# Patient Record
Sex: FEMALE | Race: WHITE | Employment: FULL TIME | URBAN - METROPOLITAN AREA
[De-identification: names, ages, dates, MRNs, and addresses within clinical notes are randomized per-mention and may not be internally consistent; named-entity substitution may affect disease eponyms.]

---

## 2017-01-05 ENCOUNTER — OFFICE VISIT (OUTPATIENT)
Dept: INTERNAL MEDICINE CLINIC | Facility: CLINIC | Age: 37
End: 2017-01-05

## 2017-01-05 VITALS
DIASTOLIC BLOOD PRESSURE: 72 MMHG | RESPIRATION RATE: 16 BRPM | SYSTOLIC BLOOD PRESSURE: 122 MMHG | BODY MASS INDEX: 29.37 KG/M2 | WEIGHT: 172 LBS | HEART RATE: 78 BPM | HEIGHT: 64 IN

## 2017-01-05 DIAGNOSIS — Z51.81 ENCOUNTER FOR THERAPEUTIC DRUG MONITORING: Primary | ICD-10-CM

## 2017-01-05 DIAGNOSIS — E66.9 OBESITY (BMI 30.0-34.9): ICD-10-CM

## 2017-01-05 DIAGNOSIS — E53.8 VITAMIN B 12 DEFICIENCY: ICD-10-CM

## 2017-01-05 PROCEDURE — 96372 THER/PROPH/DIAG INJ SC/IM: CPT | Performed by: INTERNAL MEDICINE

## 2017-01-05 PROCEDURE — 99213 OFFICE O/P EST LOW 20 MIN: CPT | Performed by: INTERNAL MEDICINE

## 2017-01-05 RX ORDER — DIETHYLPROPION HYDROCHLORIDE 75 MG/1
TABLET ORAL
Qty: 30 TABLET | Refills: 0 | Status: SHIPPED | OUTPATIENT
Start: 2017-01-05 | End: 2017-02-02

## 2017-01-05 RX ORDER — TOPIRAMATE 25 MG/1
25 TABLET ORAL 2 TIMES DAILY
Qty: 60 TABLET | Refills: 5 | Status: SHIPPED | OUTPATIENT
Start: 2017-01-05 | End: 2017-02-02

## 2017-01-05 RX ORDER — CYANOCOBALAMIN 1000 UG/ML
1000 INJECTION INTRAMUSCULAR; SUBCUTANEOUS ONCE
Status: COMPLETED | OUTPATIENT
Start: 2017-01-05 | End: 2017-01-05

## 2017-01-05 RX ADMIN — CYANOCOBALAMIN 1000 MCG: 1000 INJECTION INTRAMUSCULAR; SUBCUTANEOUS at 15:31:00

## 2017-01-05 NOTE — PROGRESS NOTES
CC: Patient presents with:  Weight Check: down 5 lb       HPI:   Obesity, doing well on diethylpropion, no chest pain, worsening hunger later in the day.        Current Outpatient Prescriptions:  Diethylpropion HCl ER 75 MG Oral Tablet 24 Hr Take 1 ta denies shortness of breath   CARDIOVASCULAR: denies chest pain    EXAM:   /72 mmHg  Pulse 78  Resp 16  Ht 64\"  Wt 172 lb  BMI 29.51 kg/m2  GENERAL:  A/O x3  HEENT: atraumatic, normocephalic, throat is clear, PERRLA  LUNGS:  CTA bilat   CARDIO: RRR w

## 2017-01-28 ENCOUNTER — LAB ENCOUNTER (OUTPATIENT)
Dept: LAB | Facility: HOSPITAL | Age: 37
End: 2017-01-28
Attending: NURSE PRACTITIONER
Payer: COMMERCIAL

## 2017-01-28 DIAGNOSIS — Z13.228 SCREENING FOR ENDOCRINE/METABOLIC/IMMUNITY DISORDERS: ICD-10-CM

## 2017-01-28 DIAGNOSIS — Z13.0 SCREENING FOR ENDOCRINE/METABOLIC/IMMUNITY DISORDERS: ICD-10-CM

## 2017-01-28 DIAGNOSIS — Z13.29 SCREENING FOR ENDOCRINE/METABOLIC/IMMUNITY DISORDERS: ICD-10-CM

## 2017-01-28 LAB
HBV SURFACE AB SER QL: REACTIVE
HBV SURFACE AB SERPL IA-ACNC: 120.48 MIU/ML
RUBV IGG SER QL: POSITIVE

## 2017-01-28 PROCEDURE — 86765 RUBEOLA ANTIBODY: CPT

## 2017-01-28 PROCEDURE — 36415 COLL VENOUS BLD VENIPUNCTURE: CPT

## 2017-01-28 PROCEDURE — 86706 HEP B SURFACE ANTIBODY: CPT

## 2017-01-28 PROCEDURE — 86787 VARICELLA-ZOSTER ANTIBODY: CPT

## 2017-01-28 PROCEDURE — 86762 RUBELLA ANTIBODY: CPT

## 2017-01-28 PROCEDURE — 86735 MUMPS ANTIBODY: CPT

## 2017-01-31 LAB
MUV IGG SER IA-ACNC: POSITIVE
VZV IGG SER IA-ACNC: POSITIVE

## 2017-02-02 ENCOUNTER — OFFICE VISIT (OUTPATIENT)
Dept: INTERNAL MEDICINE CLINIC | Facility: CLINIC | Age: 37
End: 2017-02-02

## 2017-02-02 VITALS
HEART RATE: 78 BPM | SYSTOLIC BLOOD PRESSURE: 120 MMHG | BODY MASS INDEX: 28.85 KG/M2 | RESPIRATION RATE: 16 BRPM | WEIGHT: 169 LBS | DIASTOLIC BLOOD PRESSURE: 72 MMHG | HEIGHT: 64 IN

## 2017-02-02 DIAGNOSIS — E53.8 B12 DEFICIENCY: ICD-10-CM

## 2017-02-02 DIAGNOSIS — E66.9 OBESITY (BMI 30.0-34.9): ICD-10-CM

## 2017-02-02 DIAGNOSIS — Z51.81 ENCOUNTER FOR THERAPEUTIC DRUG MONITORING: Primary | ICD-10-CM

## 2017-02-02 PROCEDURE — 96372 THER/PROPH/DIAG INJ SC/IM: CPT | Performed by: INTERNAL MEDICINE

## 2017-02-02 PROCEDURE — 99213 OFFICE O/P EST LOW 20 MIN: CPT | Performed by: INTERNAL MEDICINE

## 2017-02-02 RX ORDER — TOPIRAMATE 50 MG/1
50 TABLET, FILM COATED ORAL 2 TIMES DAILY
Qty: 60 TABLET | Refills: 5 | Status: CANCELLED | OUTPATIENT
Start: 2017-02-02 | End: 2018-01-28

## 2017-02-02 RX ORDER — BUPROPION HYDROCHLORIDE 100 MG/1
100 TABLET ORAL 2 TIMES DAILY
Qty: 60 TABLET | Refills: 5 | Status: SHIPPED | OUTPATIENT
Start: 2017-02-02 | End: 2017-03-02

## 2017-02-02 RX ORDER — DIETHYLPROPION HYDROCHLORIDE 75 MG/1
TABLET ORAL
Qty: 30 TABLET | Refills: 0 | Status: SHIPPED | OUTPATIENT
Start: 2017-02-02 | End: 2017-03-02

## 2017-02-02 RX ORDER — CYANOCOBALAMIN 1000 UG/ML
1000 INJECTION INTRAMUSCULAR; SUBCUTANEOUS ONCE
Status: COMPLETED | OUTPATIENT
Start: 2017-02-02 | End: 2017-02-02

## 2017-02-02 RX ADMIN — CYANOCOBALAMIN 1000 MCG: 1000 INJECTION INTRAMUSCULAR; SUBCUTANEOUS at 13:43:00

## 2017-02-02 NOTE — PROGRESS NOTES
CC: Patient presents with:  Weight Check: down 3 lb       HPI:   Obesity, on phentermine and topamax. Some numbness in hands with topamax. No chest pain.        Current Outpatient Prescriptions:  Diethylpropion HCl ER 75 MG Oral Tablet 24 Hr Take 1 tab SYSTEMS:   CARDIOVASCULAR: denies chest pain    EXAM:   /72 mmHg  Pulse 78  Resp 16  Ht 64\"  Wt 169 lb  BMI 28.99 kg/m2  GENERAL: well nourished and in no apparent distress, A/O x3  HEENT: PERRLA  LUNGS: clear to auscultation bilat   CARDIO: RRR wit

## 2017-02-03 ENCOUNTER — TELEPHONE (OUTPATIENT)
Dept: INTERNAL MEDICINE CLINIC | Facility: CLINIC | Age: 37
End: 2017-02-03

## 2017-02-03 NOTE — TELEPHONE ENCOUNTER
Lm for pt (Lam Rush per HIPAA) to inform, per enrique @[facility] lab, MMR and rubeola still in process- should be resulted by the end of day. Once results received and reviewed will contact pt. To call back at the office if any further questions.

## 2017-03-02 ENCOUNTER — OFFICE VISIT (OUTPATIENT)
Dept: INTERNAL MEDICINE CLINIC | Facility: CLINIC | Age: 37
End: 2017-03-02

## 2017-03-02 VITALS
RESPIRATION RATE: 16 BRPM | BODY MASS INDEX: 27.83 KG/M2 | DIASTOLIC BLOOD PRESSURE: 78 MMHG | HEART RATE: 84 BPM | HEIGHT: 64 IN | SYSTOLIC BLOOD PRESSURE: 124 MMHG | WEIGHT: 163 LBS

## 2017-03-02 DIAGNOSIS — E66.9 OBESITY (BMI 30.0-34.9): ICD-10-CM

## 2017-03-02 DIAGNOSIS — Z51.81 ENCOUNTER FOR THERAPEUTIC DRUG MONITORING: Primary | ICD-10-CM

## 2017-03-02 DIAGNOSIS — E53.8 B12 DEFICIENCY: ICD-10-CM

## 2017-03-02 PROCEDURE — 96372 THER/PROPH/DIAG INJ SC/IM: CPT | Performed by: INTERNAL MEDICINE

## 2017-03-02 PROCEDURE — 99213 OFFICE O/P EST LOW 20 MIN: CPT | Performed by: INTERNAL MEDICINE

## 2017-03-02 RX ORDER — CYANOCOBALAMIN 1000 UG/ML
1000 INJECTION INTRAMUSCULAR; SUBCUTANEOUS ONCE
Status: COMPLETED | OUTPATIENT
Start: 2017-03-02 | End: 2017-03-02

## 2017-03-02 RX ORDER — DIETHYLPROPION HYDROCHLORIDE 75 MG/1
TABLET ORAL
Qty: 30 TABLET | Refills: 0 | Status: SHIPPED | OUTPATIENT
Start: 2017-03-02 | End: 2017-04-07

## 2017-03-02 RX ORDER — BUPROPION HYDROCHLORIDE 100 MG/1
200 TABLET ORAL 2 TIMES DAILY
Qty: 120 TABLET | Refills: 5 | Status: SHIPPED | OUTPATIENT
Start: 2017-03-02 | End: 2017-09-05

## 2017-03-02 RX ADMIN — CYANOCOBALAMIN 1000 MCG: 1000 INJECTION INTRAMUSCULAR; SUBCUTANEOUS at 14:40:00

## 2017-03-30 ENCOUNTER — PATIENT MESSAGE (OUTPATIENT)
Dept: INTERNAL MEDICINE CLINIC | Facility: CLINIC | Age: 37
End: 2017-03-30

## 2017-03-30 NOTE — TELEPHONE ENCOUNTER
From: Claire Ramirez  To: Roxanne Murrell MD  Sent: 3/30/2017 2:01 PM CDT  Subject: Non-Urgent Medical Question    Hello,    Recently I have experienced a loss

## 2017-04-07 ENCOUNTER — OFFICE VISIT (OUTPATIENT)
Dept: INTERNAL MEDICINE CLINIC | Facility: CLINIC | Age: 37
End: 2017-04-07

## 2017-04-07 VITALS
HEART RATE: 78 BPM | RESPIRATION RATE: 16 BRPM | SYSTOLIC BLOOD PRESSURE: 122 MMHG | DIASTOLIC BLOOD PRESSURE: 78 MMHG | HEIGHT: 64 IN | WEIGHT: 159 LBS | BODY MASS INDEX: 27.14 KG/M2

## 2017-04-07 DIAGNOSIS — E66.9 OBESITY (BMI 30.0-34.9): ICD-10-CM

## 2017-04-07 DIAGNOSIS — Z51.81 ENCOUNTER FOR THERAPEUTIC DRUG MONITORING: Primary | ICD-10-CM

## 2017-04-07 DIAGNOSIS — E53.8 B12 DEFICIENCY: ICD-10-CM

## 2017-04-07 PROCEDURE — 96372 THER/PROPH/DIAG INJ SC/IM: CPT | Performed by: INTERNAL MEDICINE

## 2017-04-07 PROCEDURE — 99213 OFFICE O/P EST LOW 20 MIN: CPT | Performed by: INTERNAL MEDICINE

## 2017-04-07 RX ORDER — CYANOCOBALAMIN 1000 UG/ML
1000 INJECTION INTRAMUSCULAR; SUBCUTANEOUS ONCE
Status: COMPLETED | OUTPATIENT
Start: 2017-04-07 | End: 2017-04-07

## 2017-04-07 RX ORDER — DIETHYLPROPION HYDROCHLORIDE 75 MG/1
TABLET ORAL
Qty: 30 TABLET | Refills: 0 | Status: SHIPPED | OUTPATIENT
Start: 2017-04-07 | End: 2017-08-08

## 2017-04-07 RX ADMIN — CYANOCOBALAMIN 1000 MCG: 1000 INJECTION INTRAMUSCULAR; SUBCUTANEOUS at 11:31:00

## 2017-04-07 NOTE — PROGRESS NOTES
CC: Patient presents with:  Weight Check: down 4 lb       HPI:   Obesity, doing well on diethylpropion and wellbutrin, some constipation at times. No chest pain.        Current Outpatient Prescriptions:  Diethylpropion HCl ER 75 MG Oral Tablet 24 Hr Ta CARDIOVASCULAR: denies chest pain    EXAM:   /78 mmHg  Pulse 78  Resp 16  Ht 64\"  Wt 159 lb  BMI 27.28 kg/m2  GENERAL:  well nourished and in no apparent distress, A/O x3  HEENT:  PERRLA  LUNGS: clear to auscultation bilat   CARDIO: RRR without mu

## 2017-04-19 ENCOUNTER — TELEPHONE (OUTPATIENT)
Dept: FAMILY MEDICINE CLINIC | Facility: CLINIC | Age: 37
End: 2017-04-19

## 2017-04-19 NOTE — TELEPHONE ENCOUNTER
PA submitted through [facility], sent to [facility]. Medication Diethylpropion has been approved. Surinder Sotomayor has been notified.

## 2017-05-02 ENCOUNTER — OFFICE VISIT (OUTPATIENT)
Dept: INTERNAL MEDICINE CLINIC | Facility: CLINIC | Age: 37
End: 2017-05-02

## 2017-05-02 VITALS
BODY MASS INDEX: 26.46 KG/M2 | HEIGHT: 64 IN | WEIGHT: 155 LBS | SYSTOLIC BLOOD PRESSURE: 118 MMHG | RESPIRATION RATE: 16 BRPM | DIASTOLIC BLOOD PRESSURE: 78 MMHG | HEART RATE: 80 BPM

## 2017-05-02 DIAGNOSIS — E53.8 B12 DEFICIENCY: ICD-10-CM

## 2017-05-02 DIAGNOSIS — Z51.81 ENCOUNTER FOR THERAPEUTIC DRUG MONITORING: Primary | ICD-10-CM

## 2017-05-02 DIAGNOSIS — E66.9 OBESITY (BMI 30.0-34.9): ICD-10-CM

## 2017-05-02 PROCEDURE — 99213 OFFICE O/P EST LOW 20 MIN: CPT | Performed by: INTERNAL MEDICINE

## 2017-05-02 PROCEDURE — 96372 THER/PROPH/DIAG INJ SC/IM: CPT | Performed by: INTERNAL MEDICINE

## 2017-05-02 RX ORDER — PHENTERMINE HYDROCHLORIDE 15 MG/1
15 CAPSULE ORAL EVERY MORNING
Qty: 30 CAPSULE | Refills: 2 | Status: SHIPPED | OUTPATIENT
Start: 2017-05-02 | End: 2017-09-05

## 2017-05-02 RX ORDER — DIETHYLPROPION HYDROCHLORIDE 75 MG/1
TABLET ORAL
Qty: 30 TABLET | Refills: 0 | Status: CANCELLED | OUTPATIENT
Start: 2017-05-02

## 2017-05-02 RX ORDER — CYANOCOBALAMIN 1000 UG/ML
1000 INJECTION INTRAMUSCULAR; SUBCUTANEOUS ONCE
Status: COMPLETED | OUTPATIENT
Start: 2017-05-02 | End: 2017-05-02

## 2017-05-02 RX ADMIN — CYANOCOBALAMIN 1000 MCG: 1000 INJECTION INTRAMUSCULAR; SUBCUTANEOUS at 11:38:00

## 2017-05-02 NOTE — PROGRESS NOTES
CC: Patient presents with:  Weight Check: down 4 lb       HPI:   Obesity, doing well on diethylpropion and wellbutrin. No chest pain.        Current Outpatient Prescriptions:  Phentermine HCl 15 MG Oral Cap Take 1 capsule (15 mg total) by mouth every mo therapeutic drug monitoring     Fatigue     B12 deficiency        REVIEW OF SYSTEMS:   CARDIOVASCULAR: denies chest pain    EXAM:   /78 mmHg  Pulse 80  Resp 16  Ht 64\"  Wt 155 lb  BMI 26.59 kg/m2  GENERAL:  A/O x3  HEENT: atraumatic, normocephalic,

## 2017-05-05 RX ORDER — SUMATRIPTAN 50 MG/1
TABLET, FILM COATED ORAL
Qty: 9 TABLET | Refills: 0 | Status: SHIPPED | OUTPATIENT
Start: 2017-05-05 | End: 2018-02-11

## 2017-05-05 NOTE — TELEPHONE ENCOUNTER
LOV-8/16/16 SD  FOV-none  LAST RX-8/16/16 9 tabs 1 refill  LAST LABS-11/12/16  PER PROTOCOL-to provider

## 2017-08-08 ENCOUNTER — OFFICE VISIT (OUTPATIENT)
Dept: INTERNAL MEDICINE CLINIC | Facility: CLINIC | Age: 37
End: 2017-08-08

## 2017-08-08 ENCOUNTER — LAB ENCOUNTER (OUTPATIENT)
Dept: LAB | Age: 37
End: 2017-08-08
Attending: INTERNAL MEDICINE
Payer: COMMERCIAL

## 2017-08-08 VITALS
HEART RATE: 88 BPM | WEIGHT: 155 LBS | RESPIRATION RATE: 16 BRPM | DIASTOLIC BLOOD PRESSURE: 78 MMHG | BODY MASS INDEX: 26.46 KG/M2 | SYSTOLIC BLOOD PRESSURE: 122 MMHG | HEIGHT: 64 IN

## 2017-08-08 DIAGNOSIS — Z51.81 ENCOUNTER FOR THERAPEUTIC DRUG MONITORING: Primary | ICD-10-CM

## 2017-08-08 DIAGNOSIS — E66.9 OBESITY (BMI 30.0-34.9): ICD-10-CM

## 2017-08-08 DIAGNOSIS — E53.8 B12 DEFICIENCY: ICD-10-CM

## 2017-08-08 DIAGNOSIS — Z51.81 ENCOUNTER FOR THERAPEUTIC DRUG MONITORING: ICD-10-CM

## 2017-08-08 LAB
25-HYDROXYVITAMIN D (TOTAL): 59.8 NG/ML (ref 30–100)
BUN BLD-MCNC: 15 MG/DL (ref 8–20)
CALCIUM BLD-MCNC: 9.1 MG/DL (ref 8.3–10.3)
CHLORIDE: 105 MMOL/L (ref 101–111)
CO2: 31 MMOL/L (ref 22–32)
CREAT BLD-MCNC: 1.07 MG/DL (ref 0.55–1.02)
GLUCOSE BLD-MCNC: 83 MG/DL (ref 70–99)
HAV AB SERPL IA-ACNC: 313 PG/ML (ref 193–986)
POTASSIUM SERPL-SCNC: 4.1 MMOL/L (ref 3.6–5.1)
SODIUM SERPL-SCNC: 141 MMOL/L (ref 136–144)
TSI SER-ACNC: 1.58 MIU/ML (ref 0.35–5.5)

## 2017-08-08 PROCEDURE — 82306 VITAMIN D 25 HYDROXY: CPT

## 2017-08-08 PROCEDURE — 84443 ASSAY THYROID STIM HORMONE: CPT

## 2017-08-08 PROCEDURE — 82607 VITAMIN B-12: CPT

## 2017-08-08 PROCEDURE — 99213 OFFICE O/P EST LOW 20 MIN: CPT | Performed by: INTERNAL MEDICINE

## 2017-08-08 PROCEDURE — 80048 BASIC METABOLIC PNL TOTAL CA: CPT

## 2017-08-08 RX ORDER — PHENTERMINE HYDROCHLORIDE 15 MG/1
15 CAPSULE ORAL EVERY MORNING
Qty: 30 CAPSULE | Refills: 2 | Status: CANCELLED | OUTPATIENT
Start: 2017-08-08

## 2017-08-08 RX ORDER — DIETHYLPROPION HYDROCHLORIDE 75 MG/1
TABLET ORAL
Qty: 30 TABLET | Refills: 0 | Status: SHIPPED | OUTPATIENT
Start: 2017-08-08 | End: 2017-09-05

## 2017-08-08 NOTE — PROGRESS NOTES
CC: Patient presents with:  Weight Check: same       HPI:   Obesity, doing well on phentermine and wellbutrin. Still working out well.        Current Outpatient Prescriptions:  Diethylpropion HCl ER 75 MG Oral Tablet 24 Hr Take 1 cap daily Disp: 30 tabl therapeutic drug monitoring     Fatigue     B12 deficiency        REVIEW OF SYSTEMS:   CARDIOVASCULAR: denies chest pain    EXAM:   /78   Pulse 88   Resp 16   Ht 64\"   Wt 155 lb   BMI 26.61 kg/m²   GENERAL:  A/O x3  HEENT: PERRLA  LUNGS: clear to au

## 2017-08-09 ENCOUNTER — TELEPHONE (OUTPATIENT)
Dept: INTERNAL MEDICINE CLINIC | Facility: CLINIC | Age: 37
End: 2017-08-09

## 2017-08-09 NOTE — TELEPHONE ENCOUNTER
Hari called starting patient is already taking   Diethylpropion HCl ER 75 MG Oral Tablet 24 Hr 30 tablet 0 8/8/2017     But filled   Phentermine HCl 15 MG Oral Cap 30 capsule 2 5/2/2017      Wanted to clarify that patient cannot take both at same time.

## 2017-08-10 NOTE — TELEPHONE ENCOUNTER
Clarified with pharmacy per information below pt is to take Diethylpropion ER 75 mg, instead of Phentermine 15mg. Pharmacist verbalized understanding, no further questions at this time. LOV 8/8/17  PLAN:  1.  Obesity (BMI 30.0-34.9), pt with wt stable

## 2017-08-24 RX ORDER — MONTELUKAST SODIUM 10 MG/1
TABLET ORAL
Qty: 30 TABLET | Refills: 2 | Status: SHIPPED | OUTPATIENT
Start: 2017-08-24 | End: 2017-09-21

## 2017-08-24 NOTE — TELEPHONE ENCOUNTER
LOV : 8/16/2016   Upcoming visit: none   Last labs: 8/8/2017 bmp, vit d 25, vit b12,   Last refill: 8/16/16, 90 tab 3 refills  Per protocol  Route to provider   Pt needs appt

## 2017-08-29 ENCOUNTER — TELEPHONE (OUTPATIENT)
Dept: INTERNAL MEDICINE CLINIC | Facility: CLINIC | Age: 37
End: 2017-08-29

## 2017-08-29 DIAGNOSIS — Z13.29 SCREENING FOR THYROID DISORDER: ICD-10-CM

## 2017-08-29 DIAGNOSIS — Z00.00 ROUTINE GENERAL MEDICAL EXAMINATION AT A HEALTH CARE FACILITY: Primary | ICD-10-CM

## 2017-08-29 DIAGNOSIS — Z13.228 SCREENING FOR METABOLIC DISORDER: ICD-10-CM

## 2017-08-29 DIAGNOSIS — Z13.220 SCREENING FOR LIPID DISORDERS: ICD-10-CM

## 2017-08-29 DIAGNOSIS — Z13.0 SCREENING FOR DISORDER OF BLOOD AND BLOOD-FORMING ORGANS: ICD-10-CM

## 2017-09-05 ENCOUNTER — OFFICE VISIT (OUTPATIENT)
Dept: INTERNAL MEDICINE CLINIC | Facility: CLINIC | Age: 37
End: 2017-09-05

## 2017-09-05 ENCOUNTER — LAB ENCOUNTER (OUTPATIENT)
Dept: LAB | Age: 37
End: 2017-09-05
Attending: NURSE PRACTITIONER
Payer: COMMERCIAL

## 2017-09-05 VITALS
BODY MASS INDEX: 26.8 KG/M2 | RESPIRATION RATE: 16 BRPM | HEIGHT: 64 IN | WEIGHT: 157 LBS | HEART RATE: 88 BPM | SYSTOLIC BLOOD PRESSURE: 118 MMHG | DIASTOLIC BLOOD PRESSURE: 68 MMHG

## 2017-09-05 DIAGNOSIS — E53.8 B12 DEFICIENCY: ICD-10-CM

## 2017-09-05 DIAGNOSIS — Z00.00 ROUTINE GENERAL MEDICAL EXAMINATION AT A HEALTH CARE FACILITY: ICD-10-CM

## 2017-09-05 DIAGNOSIS — Z13.220 SCREENING FOR LIPID DISORDERS: ICD-10-CM

## 2017-09-05 DIAGNOSIS — Z51.81 ENCOUNTER FOR THERAPEUTIC DRUG MONITORING: Primary | ICD-10-CM

## 2017-09-05 DIAGNOSIS — Z13.228 SCREENING FOR METABOLIC DISORDER: ICD-10-CM

## 2017-09-05 DIAGNOSIS — Z13.0 SCREENING FOR DISORDER OF BLOOD AND BLOOD-FORMING ORGANS: ICD-10-CM

## 2017-09-05 DIAGNOSIS — E66.9 OBESITY (BMI 30.0-34.9): ICD-10-CM

## 2017-09-05 LAB
ALBUMIN SERPL-MCNC: 4.4 G/DL (ref 3.5–4.8)
ALP LIVER SERPL-CCNC: 62 U/L (ref 37–98)
ALT SERPL-CCNC: 18 U/L (ref 14–54)
AST SERPL-CCNC: 11 U/L (ref 15–41)
BASOPHILS # BLD AUTO: 0.04 X10(3) UL (ref 0–0.1)
BASOPHILS NFR BLD AUTO: 0.7 %
BILIRUB SERPL-MCNC: 0.5 MG/DL (ref 0.1–2)
BUN BLD-MCNC: 10 MG/DL (ref 8–20)
CALCIUM BLD-MCNC: 9.4 MG/DL (ref 8.3–10.3)
CHLORIDE: 103 MMOL/L (ref 101–111)
CHOLEST SMN-MCNC: 175 MG/DL (ref ?–200)
CO2: 29 MMOL/L (ref 22–32)
CREAT BLD-MCNC: 0.9 MG/DL (ref 0.55–1.02)
EOSINOPHIL # BLD AUTO: 0.13 X10(3) UL (ref 0–0.3)
EOSINOPHIL NFR BLD AUTO: 2.2 %
ERYTHROCYTE [DISTWIDTH] IN BLOOD BY AUTOMATED COUNT: 13 % (ref 11.5–16)
GLUCOSE BLD-MCNC: 79 MG/DL (ref 70–99)
HCT VFR BLD AUTO: 41.2 % (ref 34–50)
HDLC SERPL-MCNC: 67 MG/DL (ref 45–?)
HDLC SERPL: 2.61 {RATIO} (ref ?–4.44)
HGB BLD-MCNC: 13.3 G/DL (ref 12–16)
IMMATURE GRANULOCYTE COUNT: 0.01 X10(3) UL (ref 0–1)
IMMATURE GRANULOCYTE RATIO %: 0.2 %
LDLC SERPL CALC-MCNC: 98 MG/DL (ref ?–130)
LDLC SERPL-MCNC: 10 MG/DL (ref 5–40)
LYMPHOCYTES # BLD AUTO: 1.48 X10(3) UL (ref 0.9–4)
LYMPHOCYTES NFR BLD AUTO: 25.1 %
M PROTEIN MFR SERPL ELPH: 7.5 G/DL (ref 6.1–8.3)
MCH RBC QN AUTO: 29.2 PG (ref 27–33.2)
MCHC RBC AUTO-ENTMCNC: 32.3 G/DL (ref 31–37)
MCV RBC AUTO: 90.5 FL (ref 81–100)
MONOCYTES # BLD AUTO: 0.58 X10(3) UL (ref 0.1–0.6)
MONOCYTES NFR BLD AUTO: 9.8 %
NEUTROPHIL ABS PRELIM: 3.66 X10 (3) UL (ref 1.3–6.7)
NEUTROPHILS # BLD AUTO: 3.66 X10(3) UL (ref 1.3–6.7)
NEUTROPHILS NFR BLD AUTO: 62 %
NONHDLC SERPL-MCNC: 108 MG/DL (ref ?–130)
PLATELET # BLD AUTO: 290 10(3)UL (ref 150–450)
POTASSIUM SERPL-SCNC: 3.8 MMOL/L (ref 3.6–5.1)
RBC # BLD AUTO: 4.55 X10(6)UL (ref 3.8–5.1)
RED CELL DISTRIBUTION WIDTH-SD: 42.5 FL (ref 35.1–46.3)
SODIUM SERPL-SCNC: 138 MMOL/L (ref 136–144)
TRIGLYCERIDES: 52 MG/DL (ref ?–150)
WBC # BLD AUTO: 5.9 X10(3) UL (ref 4–13)

## 2017-09-05 PROCEDURE — 80061 LIPID PANEL: CPT

## 2017-09-05 PROCEDURE — 96372 THER/PROPH/DIAG INJ SC/IM: CPT | Performed by: INTERNAL MEDICINE

## 2017-09-05 PROCEDURE — 80053 COMPREHEN METABOLIC PANEL: CPT

## 2017-09-05 PROCEDURE — 99213 OFFICE O/P EST LOW 20 MIN: CPT | Performed by: INTERNAL MEDICINE

## 2017-09-05 PROCEDURE — 85025 COMPLETE CBC W/AUTO DIFF WBC: CPT

## 2017-09-05 RX ORDER — CYANOCOBALAMIN 1000 UG/ML
1000 INJECTION INTRAMUSCULAR; SUBCUTANEOUS ONCE
Status: COMPLETED | OUTPATIENT
Start: 2017-09-05 | End: 2017-09-05

## 2017-09-05 RX ORDER — METFORMIN HYDROCHLORIDE 750 MG/1
750 TABLET, EXTENDED RELEASE ORAL DAILY
Qty: 30 TABLET | Refills: 5 | Status: SHIPPED | OUTPATIENT
Start: 2017-09-05 | End: 2018-01-23

## 2017-09-05 RX ORDER — DIETHYLPROPION HYDROCHLORIDE 75 MG/1
TABLET ORAL
Qty: 30 TABLET | Refills: 0 | Status: SHIPPED | OUTPATIENT
Start: 2017-09-05 | End: 2017-10-17 | Stop reason: ALTCHOICE

## 2017-09-05 RX ORDER — BUPROPION HYDROCHLORIDE 100 MG/1
200 TABLET ORAL 2 TIMES DAILY
Qty: 120 TABLET | Refills: 5 | Status: SHIPPED | OUTPATIENT
Start: 2017-09-05 | End: 2017-11-09

## 2017-09-05 RX ADMIN — CYANOCOBALAMIN 1000 MCG: 1000 INJECTION INTRAMUSCULAR; SUBCUTANEOUS at 14:28:00

## 2017-09-05 NOTE — PROGRESS NOTES
CC: Patient presents with:  Weight Check: up 2 lbs       HPI:   Obesity, doing well on diethylpropion and wellbutrin. No chest pain. Some cravings at times.        Current Outpatient Prescriptions:  Diethylpropion HCl ER 75 MG Oral Tablet 24 Hr Take 1 c Encounter for therapeutic drug monitoring     Fatigue     B12 deficiency        REVIEW OF SYSTEMS:   CARDIOVASCULAR: denies chest pain    EXAM:   /68   Pulse 88   Resp 16   Ht 64\"   Wt 157 lb   BMI 26.95 kg/m²   GENERAL:  A/O x3  HEENT: NCAT, PERRLA

## 2017-09-21 ENCOUNTER — OFFICE VISIT (OUTPATIENT)
Dept: INTERNAL MEDICINE CLINIC | Facility: CLINIC | Age: 37
End: 2017-09-21

## 2017-09-21 VITALS
HEART RATE: 84 BPM | DIASTOLIC BLOOD PRESSURE: 78 MMHG | RESPIRATION RATE: 18 BRPM | BODY MASS INDEX: 26.29 KG/M2 | HEIGHT: 64 IN | WEIGHT: 154 LBS | TEMPERATURE: 99 F | SYSTOLIC BLOOD PRESSURE: 104 MMHG

## 2017-09-21 DIAGNOSIS — G43.809 OTHER MIGRAINE WITHOUT STATUS MIGRAINOSUS, NOT INTRACTABLE: ICD-10-CM

## 2017-09-21 DIAGNOSIS — E53.8 B12 DEFICIENCY: ICD-10-CM

## 2017-09-21 DIAGNOSIS — Z00.00 ROUTINE GENERAL MEDICAL EXAMINATION AT A HEALTH CARE FACILITY: Primary | ICD-10-CM

## 2017-09-21 DIAGNOSIS — J45.909 UNSPECIFIED ASTHMA(493.90): ICD-10-CM

## 2017-09-21 PROCEDURE — 99395 PREV VISIT EST AGE 18-39: CPT | Performed by: NURSE PRACTITIONER

## 2017-09-21 RX ORDER — MONTELUKAST SODIUM 10 MG/1
TABLET ORAL
Qty: 90 TABLET | Refills: 3 | Status: SHIPPED | OUTPATIENT
Start: 2017-09-21 | End: 2018-10-01

## 2017-09-21 NOTE — PROGRESS NOTES
Rodrigo Caba is a 42year old female who presents for a complete physical exam:       Patient complains of:    Seeing Dr Saumya Walker weight loss clinic. Down 30# total since 1 year ago. Asthma  Score 25.  singulair daily with proair prn.   Reviewed and ag each by Intrauterine route once. Disp:  Rfl:    Vitamin D3 2000 UNITS Oral Cap Take 4,000 Units by mouth daily. Disp:  Rfl:    Ondansetron HCl (ZOFRAN) 4 mg tablet Take 1 tablet (4 mg total) by mouth every 8 (eight) hours as needed for Nausea.  Disp: 20 tab vision  HEENT: denies nasal congestion, sinus pain or ST  LUNGS: denies shortness of breath with exertion  CARDIOVASCULAR: denies chest pain on exertion  GI: denies abdominal pain,denies heartburn  : denies dysuria, vaginal discharge or itching  MUSCULOS

## 2017-10-17 ENCOUNTER — OFFICE VISIT (OUTPATIENT)
Dept: INTERNAL MEDICINE CLINIC | Facility: CLINIC | Age: 37
End: 2017-10-17

## 2017-10-17 VITALS
BODY MASS INDEX: 26.63 KG/M2 | SYSTOLIC BLOOD PRESSURE: 110 MMHG | DIASTOLIC BLOOD PRESSURE: 76 MMHG | RESPIRATION RATE: 16 BRPM | WEIGHT: 156 LBS | HEART RATE: 80 BPM | HEIGHT: 64 IN

## 2017-10-17 DIAGNOSIS — Z51.81 ENCOUNTER FOR THERAPEUTIC DRUG MONITORING: Primary | ICD-10-CM

## 2017-10-17 DIAGNOSIS — E53.8 B12 DEFICIENCY: ICD-10-CM

## 2017-10-17 DIAGNOSIS — E66.9 OBESITY (BMI 30.0-34.9): ICD-10-CM

## 2017-10-17 PROCEDURE — 96372 THER/PROPH/DIAG INJ SC/IM: CPT | Performed by: NURSE PRACTITIONER

## 2017-10-17 PROCEDURE — 99213 OFFICE O/P EST LOW 20 MIN: CPT | Performed by: NURSE PRACTITIONER

## 2017-10-17 RX ORDER — PHENTERMINE HYDROCHLORIDE 37.5 MG/1
37.5 TABLET ORAL
Qty: 30 TABLET | Refills: 0 | Status: SHIPPED | OUTPATIENT
Start: 2017-10-17 | End: 2017-12-19

## 2017-10-17 RX ORDER — DIETHYLPROPION HYDROCHLORIDE 75 MG/1
TABLET ORAL
Qty: 30 TABLET | Refills: 0 | Status: CANCELLED | OUTPATIENT
Start: 2017-10-17

## 2017-10-17 RX ORDER — CYANOCOBALAMIN 1000 UG/ML
1000 INJECTION INTRAMUSCULAR; SUBCUTANEOUS ONCE
Status: COMPLETED | OUTPATIENT
Start: 2017-10-17 | End: 2017-10-17

## 2017-10-17 RX ADMIN — CYANOCOBALAMIN 1000 MCG: 1000 INJECTION INTRAMUSCULAR; SUBCUTANEOUS at 12:50:00

## 2017-10-17 NOTE — PATIENT INSTRUCTIONS
Congratulations on your 1# weight loss! Continue making lifestyle changes that focus on good nutrition, regular exercise and stress management. Medication Plan: Stop diethylpropion, start phentermine daily.     Agreed upon goal/s before next office visit or flight.” However, once the effects of adrenaline wear off, cortisol, known as the “stress hormone,” hangs around and starts signaling the body to replenish your food supply.  Fighting off wild animals, like our ancestors did, used up a lot of energy, so because we can’t sit still, anxiety can also trigger “emotional eating.” Overeating or eating unhealthy foods in response to stress or as a way to calm down is a very common response.  In the most recent [employer] Association’s “Stress in [place] predator lead the mice to eat more high-fat food pellets, when given the choice of eating these instead of normal feed.   Less Sleep  Do you ever lie awake at night worrying about paying the bills or about who will watch your kids when you have to go to wor rather than eating just because it’s a mealtime or because there is food in front of you. A well-designed study of binge-eaters showed that participating in a Mindful Eating program led to fewer binges and reduced depression.   Find Rewarding Activities Unr

## 2017-11-07 ENCOUNTER — TELEPHONE (OUTPATIENT)
Dept: INTERNAL MEDICINE CLINIC | Facility: CLINIC | Age: 37
End: 2017-11-07

## 2017-11-09 RX ORDER — BUPROPION HYDROCHLORIDE 100 MG/1
200 TABLET ORAL 2 TIMES DAILY
Qty: 360 TABLET | Refills: 0 | Status: SHIPPED | OUTPATIENT
Start: 2017-11-09 | End: 2018-01-23

## 2017-11-09 NOTE — TELEPHONE ENCOUNTER
Requesting BuPROPion HCl (WELLBUTRIN) 100 MG Oral Tab   LOV: 10/17/17  RTC: 1 mth  Last Relevant Labs:   Filled: 9/5/17 #120 with 5 refills    Future Appointments  Date Time Provider Beny Mccray   12/13/2017 1:20 PM INDIO Prado [facility]

## 2017-11-21 ENCOUNTER — TELEPHONE (OUTPATIENT)
Dept: INTERNAL MEDICINE CLINIC | Facility: CLINIC | Age: 37
End: 2017-11-21

## 2017-11-21 NOTE — TELEPHONE ENCOUNTER
Called and left message for pt to call back, pt is on wait list and we have openings if pt is available to come in .

## 2017-12-19 ENCOUNTER — OFFICE VISIT (OUTPATIENT)
Dept: INTERNAL MEDICINE CLINIC | Facility: CLINIC | Age: 37
End: 2017-12-19

## 2017-12-19 VITALS
SYSTOLIC BLOOD PRESSURE: 100 MMHG | BODY MASS INDEX: 25.95 KG/M2 | HEIGHT: 64 IN | DIASTOLIC BLOOD PRESSURE: 60 MMHG | HEART RATE: 68 BPM | WEIGHT: 152 LBS | RESPIRATION RATE: 16 BRPM

## 2017-12-19 DIAGNOSIS — E66.9 OBESITY (BMI 30.0-34.9): ICD-10-CM

## 2017-12-19 DIAGNOSIS — Z51.81 ENCOUNTER FOR THERAPEUTIC DRUG MONITORING: Primary | ICD-10-CM

## 2017-12-19 DIAGNOSIS — E53.8 B12 DEFICIENCY: ICD-10-CM

## 2017-12-19 PROCEDURE — 96372 THER/PROPH/DIAG INJ SC/IM: CPT | Performed by: NURSE PRACTITIONER

## 2017-12-19 PROCEDURE — 99213 OFFICE O/P EST LOW 20 MIN: CPT | Performed by: NURSE PRACTITIONER

## 2017-12-19 RX ORDER — PHENTERMINE HYDROCHLORIDE 37.5 MG/1
37.5 TABLET ORAL
Qty: 30 TABLET | Refills: 0 | Status: SHIPPED | OUTPATIENT
Start: 2017-12-19 | End: 2018-01-23 | Stop reason: DRUGHIGH

## 2017-12-19 RX ORDER — CYANOCOBALAMIN 1000 UG/ML
1000 INJECTION INTRAMUSCULAR; SUBCUTANEOUS ONCE
Status: COMPLETED | OUTPATIENT
Start: 2017-12-19 | End: 2017-12-19

## 2017-12-19 RX ADMIN — CYANOCOBALAMIN 1000 MCG: 1000 INJECTION INTRAMUSCULAR; SUBCUTANEOUS at 11:02:00

## 2017-12-19 NOTE — PROGRESS NOTES
Shira Angeles is a 42year old female presents today for >1 year follow-up on medical weight loss program for the treatment of overweight, obesity, or morbid obesity associated elevated leptin.  Previous patient of Dr. Derik Rodríguez and [facility] client since 11/10/201 clicks or gallops, no pedal edema.    GI: +BS, soft  NEURO/MS: motor and sensory grossly intact  PSYCH: pleasant, cooperative, normal mood and affect    ASSESSMENT AND PLAN:  Encounter for therapeutic drug monitoring  (primary encounter diagnosis)  Obesity anyway. And whether or not Roger Miranda been trying to use emotional eating to soothe feelings of stress, depression, loneliness, frustration or boredom, in the long run, overeating to feed those feelings only makes them worse.   But learning how to stop overeat and fresh fruits, plus healthy high protein foods, like fish, lean poultry and low-fat dairy. 7. Eat mini-meals often. By eating 5 or 6 small healthy meals a day, including breakfast, you help keep your blood sugar and moods stable.   8. Get rid of temptat stairs instead of the elevator. Park in a far away parking spot instead of the closest. Shahbaz Sabot be surprised at how fast these little changes can make a difference. Some people really cannot walk very far, and tire out quickly with exercise.  Instead of bec

## 2017-12-19 NOTE — PATIENT INSTRUCTIONS
Congratulations on your 4# weight loss! Continue making lifestyle changes that focus on good nutrition, regular exercise and stress management. Medication Plan: Continue current medication regimen. Vitamin B12 injection given today.     Agreed upon goal/ and what you eat and how you feel before and afterwards. 3. Manage your stress. Healthy emotional distress management is an important life skill.  Positive ways to reduce stress include regular exercise, relaxation techniques and getting support from famil Rae Henao On December 27, 2015 @ 11:33 am In Healthy Living. Taken from www.[employer]    Weight Management: Exercise and Activity    Studies show that people who exercise are the most likely to lose weight and keep it off. Exercise burns calories. [address], [address]. All rights reserved. This information is not intended as a substitute for professional medical care. Always follow your healthcare professional's instructions.

## 2018-01-23 ENCOUNTER — OFFICE VISIT (OUTPATIENT)
Dept: INTERNAL MEDICINE CLINIC | Facility: CLINIC | Age: 38
End: 2018-01-23

## 2018-01-23 VITALS
SYSTOLIC BLOOD PRESSURE: 100 MMHG | DIASTOLIC BLOOD PRESSURE: 70 MMHG | HEART RATE: 68 BPM | WEIGHT: 151 LBS | HEIGHT: 62 IN | BODY MASS INDEX: 27.79 KG/M2 | RESPIRATION RATE: 16 BRPM

## 2018-01-23 DIAGNOSIS — E66.9 OBESITY (BMI 30.0-34.9): ICD-10-CM

## 2018-01-23 DIAGNOSIS — E53.8 B12 DEFICIENCY: ICD-10-CM

## 2018-01-23 DIAGNOSIS — Z51.81 ENCOUNTER FOR THERAPEUTIC DRUG MONITORING: Primary | ICD-10-CM

## 2018-01-23 PROCEDURE — 99213 OFFICE O/P EST LOW 20 MIN: CPT | Performed by: NURSE PRACTITIONER

## 2018-01-23 RX ORDER — METFORMIN HYDROCHLORIDE 750 MG/1
750 TABLET, EXTENDED RELEASE ORAL DAILY
Qty: 30 TABLET | Refills: 5 | Status: SHIPPED | OUTPATIENT
Start: 2018-01-23 | End: 2018-04-23

## 2018-01-23 RX ORDER — BUPROPION HYDROCHLORIDE 100 MG/1
200 TABLET ORAL 2 TIMES DAILY
Qty: 360 TABLET | Refills: 1 | Status: SHIPPED | OUTPATIENT
Start: 2018-01-23 | End: 2018-09-12

## 2018-01-23 RX ORDER — PHENTERMINE HYDROCHLORIDE 15 MG/1
15 CAPSULE ORAL EVERY MORNING
Qty: 30 CAPSULE | Refills: 2 | Status: SHIPPED | OUTPATIENT
Start: 2018-01-23 | End: 2018-04-23

## 2018-01-23 RX ORDER — PHENTERMINE HYDROCHLORIDE 37.5 MG/1
37.5 TABLET ORAL
Qty: 30 TABLET | Refills: 0 | Status: CANCELLED | OUTPATIENT
Start: 2018-01-23

## 2018-01-23 NOTE — PROGRESS NOTES
Rodrigo Caba is a 42year old female presents today for follow-up on medical weight loss program for the treatment of overweight, obesity, or morbid obesity associated elevated leptin. Patient has lost 1# since LOV 1 month ago.  On phentermine QOD, diane ASSESSMENT AND PLAN:  Encounter for therapeutic drug monitoring  (primary encounter diagnosis)  Obesity (bmi 30.0-34. 9)  B12 deficiency    No orders of the defined types were placed in this encounter.       Meds & Refills for this Visit:  Signed Prescriptio You have been exercising for 30 minutes most days of the week. Now you can move on to the next stage: increasing the intensity. This means doing your activity in one or more of these ways:  · Longer. Exercise for 30 minutes or more without a break.   · Fast © 6516-7897 The [address] [place] [address], [address] [place]. All rights reserved. This information is not intended as a substitute for professional medical care. Always follow your healthcare professional's instructions.     Exercise Pr While simple walking or running on a treadmill is good, pushing yourself harder is better.  Intervals of fast and hard activity that target an end heart rate at 85% max are attempted, followed by a relative recovery interval at your regular pace that brings Return in about 3 months (around 4/23/2018) for weight management. Patient verbalizes understanding.

## 2018-01-23 NOTE — PATIENT INSTRUCTIONS
Congratulations on your 1# weight loss! Continue making lifestyle changes that focus on good nutrition, regular exercise and stress management. Medication Plan: Continue current medication regimen aside from reduce phentermine to 15 mg daily.  Switch to expecting you, you’ll be less likely to skip your workout. · Pack a workout bag with everything you need. Then it’s ready when you are. · Choose a few different activities so you’ll stay interested. Make it fun! What will help you to stick with it?   1. distances. Interval training:    While simple walking or running on a treadmill is good, pushing yourself harder is better.  Intervals of fast and hard activity that target an end heart rate at 85% max are attempted, followed by a relative recovery inter

## 2018-01-25 ENCOUNTER — TELEPHONE (OUTPATIENT)
Dept: INTERNAL MEDICINE CLINIC | Facility: CLINIC | Age: 38
End: 2018-01-25

## 2018-01-25 NOTE — TELEPHONE ENCOUNTER
Name of Medication:  Cyanocobalamin (NASCOBAL) 500 MCG/0.1ML Nasal Solution 4 Bottle 5 1/23/2018    Sig :  1 each by Nasal route once a week. Route:   Nasal           Dose:     How is medication prescribed:    Specific name of pharmacy and location:

## 2018-01-25 NOTE — TELEPHONE ENCOUNTER
Pharmacy aware instructions for Nascobal are:  1 spray in 1 nostril once a week. Discard after use. 1 dose spray.

## 2018-02-11 ENCOUNTER — PATIENT MESSAGE (OUTPATIENT)
Dept: INTERNAL MEDICINE CLINIC | Facility: CLINIC | Age: 38
End: 2018-02-11

## 2018-02-12 ENCOUNTER — TELEPHONE (OUTPATIENT)
Dept: INTERNAL MEDICINE CLINIC | Facility: CLINIC | Age: 38
End: 2018-02-12

## 2018-02-12 RX ORDER — ONDANSETRON 4 MG/1
4 TABLET, FILM COATED ORAL EVERY 8 HOURS PRN
Qty: 20 TABLET | Refills: 0 | Status: SHIPPED | OUTPATIENT
Start: 2018-02-12 | End: 2018-11-26

## 2018-02-12 RX ORDER — SUMATRIPTAN 50 MG/1
TABLET, FILM COATED ORAL
Qty: 9 TABLET | Refills: 3 | Status: SHIPPED | OUTPATIENT
Start: 2018-02-12 | End: 2021-04-28

## 2018-02-12 NOTE — TELEPHONE ENCOUNTER
LOV: 9/21/17  Future office visit: No upcoming visit   Last labs: 9/5/17 Lipid Cmp Cbc   Last RX: 5/5/17 #9 No Refills  Per protocol: Route to provider

## 2018-02-12 NOTE — TELEPHONE ENCOUNTER
Song Uribe RN 2/12/2018 7:12 AM CST        ----- Message -----  From: Rodrigo Vitale  Sent: 2/11/2018 1:46 PM  To: Lisette Geronimo 28 Clinical Staff  Subject: Prescription Question     Woo Villeda! My pharmacy should be sending the request for my imitrex refill.  Co

## 2018-02-12 NOTE — TELEPHONE ENCOUNTER
Ondansetron HCl (ZOFRAN) 4 mg tablet 20 tablet 0 2/12/2018    Sig :  Take 1 tablet (4 mg total) by mouth every 8 (eight) hours as needed for Nausea. Route:   Oral       SD sent in rx for this med today-is this dissolve tablet?  Call to clarify oral what

## 2018-02-14 ENCOUNTER — LAB ENCOUNTER (OUTPATIENT)
Dept: LAB | Age: 38
End: 2018-02-14
Attending: NURSE PRACTITIONER
Payer: COMMERCIAL

## 2018-02-14 DIAGNOSIS — R87.810 CERVICAL HIGH RISK HUMAN PAPILLOMAVIRUS (HPV) DNA TEST POSITIVE: Primary | ICD-10-CM

## 2018-02-14 PROCEDURE — 87625 HPV TYPES 16 & 18 ONLY: CPT

## 2018-02-14 PROCEDURE — 88175 CYTOPATH C/V AUTO FLUID REDO: CPT

## 2018-02-14 PROCEDURE — 87624 HPV HI-RISK TYP POOLED RSLT: CPT

## 2018-02-15 LAB — HPV I/H RISK 1 DNA SPEC QL NAA+PROBE: POSITIVE

## 2018-02-18 LAB
HPV16 DNA CVX QL PROBE+SIG AMP: POSITIVE
HPV18 DNA CVX QL PROBE+SIG AMP: NEGATIVE

## 2018-04-10 ENCOUNTER — TELEPHONE (OUTPATIENT)
Dept: INTERNAL MEDICINE CLINIC | Facility: CLINIC | Age: 38
End: 2018-04-10

## 2018-04-10 DIAGNOSIS — Z01.818 PRE-OP TESTING: Primary | ICD-10-CM

## 2018-04-10 NOTE — TELEPHONE ENCOUNTER
Called informed Electrolyte panel order placed. Patient will have done two weeks prior to procedure. Patient verbalized understanding and agreeable to POC.

## 2018-04-23 ENCOUNTER — OFFICE VISIT (OUTPATIENT)
Dept: INTERNAL MEDICINE CLINIC | Facility: CLINIC | Age: 38
End: 2018-04-23

## 2018-04-23 VITALS
SYSTOLIC BLOOD PRESSURE: 100 MMHG | HEIGHT: 62 IN | DIASTOLIC BLOOD PRESSURE: 70 MMHG | RESPIRATION RATE: 16 BRPM | HEART RATE: 80 BPM | WEIGHT: 154 LBS | BODY MASS INDEX: 28.34 KG/M2

## 2018-04-23 DIAGNOSIS — Z51.81 ENCOUNTER FOR THERAPEUTIC DRUG MONITORING: Primary | ICD-10-CM

## 2018-04-23 DIAGNOSIS — E53.8 B12 DEFICIENCY: ICD-10-CM

## 2018-04-23 DIAGNOSIS — E66.9 OBESITY (BMI 30.0-34.9): ICD-10-CM

## 2018-04-23 PROCEDURE — 96372 THER/PROPH/DIAG INJ SC/IM: CPT | Performed by: NURSE PRACTITIONER

## 2018-04-23 PROCEDURE — 99213 OFFICE O/P EST LOW 20 MIN: CPT | Performed by: NURSE PRACTITIONER

## 2018-04-23 RX ORDER — METFORMIN HYDROCHLORIDE 750 MG/1
1500 TABLET, EXTENDED RELEASE ORAL
Qty: 60 TABLET | Refills: 5 | Status: SHIPPED | OUTPATIENT
Start: 2018-04-23 | End: 2018-09-12

## 2018-04-23 RX ORDER — CYANOCOBALAMIN 1000 UG/ML
1000 INJECTION INTRAMUSCULAR; SUBCUTANEOUS ONCE
Status: COMPLETED | OUTPATIENT
Start: 2018-04-23 | End: 2018-04-23

## 2018-04-23 RX ORDER — PHENTERMINE HYDROCHLORIDE 15 MG/1
15 CAPSULE ORAL EVERY MORNING
Qty: 30 CAPSULE | Refills: 2 | Status: SHIPPED | OUTPATIENT
Start: 2018-04-23 | End: 2018-09-12

## 2018-04-23 RX ADMIN — CYANOCOBALAMIN 1000 MCG: 1000 INJECTION INTRAMUSCULAR; SUBCUTANEOUS at 09:31:00

## 2018-04-23 NOTE — PROGRESS NOTES
Christine Gale is a 42year old female presents today for follow-up on medical weight loss program for the treatment of overweight, obesity, or morbid obesity associated elevated leptin. Patient has lost -3# since LOV 3 month ago.  On phentermine QOD, do soft  NEURO/MS: motor and sensory grossly intact  PSYCH: pleasant, cooperative, normal mood and affect    ASSESSMENT AND PLAN:  Encounter for therapeutic drug monitoring  (primary encounter diagnosis)  Obesity (bmi 30.0-34. 9)  B12 deficiency      Orders [address] [place] with group fitness and personal training- ask for Vesta Zee @ [phone] or [email or web address] to discuss discounts offered through the [facility] program.  · Any

## 2018-04-23 NOTE — PATIENT INSTRUCTIONS
Continue making lifestyle changes that focus on good nutrition, regular exercise and stress management. Medication Plan: Continue current medication regimen aside from increase Metformin ER to 1500 mg daily.     Agreed upon goal/s before next office visi

## 2018-05-01 ENCOUNTER — APPOINTMENT (OUTPATIENT)
Dept: LAB | Age: 38
End: 2018-05-01
Attending: NURSE PRACTITIONER
Payer: COMMERCIAL

## 2018-05-01 DIAGNOSIS — E53.8 B12 DEFICIENCY: ICD-10-CM

## 2018-05-01 DIAGNOSIS — Z01.818 PRE-OP TESTING: ICD-10-CM

## 2018-05-01 PROCEDURE — 82607 VITAMIN B-12: CPT

## 2018-05-01 PROCEDURE — 80053 COMPREHEN METABOLIC PANEL: CPT

## 2018-05-08 NOTE — H&P
[address] [facility] Patient Status:  Hospital Outpatient Surgery    1980 MRN [id number]   [facility] SURGERY Attending Jose Alfredo De La Cruz MD   Hosp Day # 0 PCP Walt Dumont MD     SUBJECTIVE:    Beatris Rosado For 6.00 Years     Smokeless tobacco: Never Used    Comment: quit 20 years ago    Alcohol use No        Family History:  Family History   Problem Relation Age of Onset   • Asthma Brother    • Hypertension Father    • Lipids Father    • Heart Disorder Mater

## 2018-05-09 ENCOUNTER — SURGERY (OUTPATIENT)
Age: 38
End: 2018-05-09

## 2018-05-09 ENCOUNTER — HOSPITAL ENCOUNTER (OUTPATIENT)
Facility: HOSPITAL | Age: 38
Setting detail: HOSPITAL OUTPATIENT SURGERY
Discharge: HOME | End: 2018-05-09
Attending: OBSTETRICS & GYNECOLOGY | Admitting: OBSTETRICS & GYNECOLOGY
Payer: COMMERCIAL

## 2018-05-09 ENCOUNTER — ANESTHESIA EVENT (OUTPATIENT)
Dept: SURGERY | Facility: HOSPITAL | Age: 38
End: 2018-05-09
Payer: COMMERCIAL

## 2018-05-09 ENCOUNTER — ANESTHESIA (OUTPATIENT)
Dept: SURGERY | Facility: HOSPITAL | Age: 38
End: 2018-05-09
Payer: COMMERCIAL

## 2018-05-09 VITALS
TEMPERATURE: 98 F | WEIGHT: 152.31 LBS | SYSTOLIC BLOOD PRESSURE: 111 MMHG | HEART RATE: 78 BPM | HEIGHT: 64 IN | RESPIRATION RATE: 16 BRPM | OXYGEN SATURATION: 96 % | BODY MASS INDEX: 26 KG/M2 | DIASTOLIC BLOOD PRESSURE: 98 MMHG

## 2018-05-09 PROCEDURE — 88307 TISSUE EXAM BY PATHOLOGIST: CPT | Performed by: OBSTETRICS & GYNECOLOGY

## 2018-05-09 PROCEDURE — 81025 URINE PREGNANCY TEST: CPT | Performed by: OBSTETRICS & GYNECOLOGY

## 2018-05-09 PROCEDURE — 88342 IMHCHEM/IMCYTCHM 1ST ANTB: CPT | Performed by: OBSTETRICS & GYNECOLOGY

## 2018-05-09 PROCEDURE — 0UBC8ZX EXCISION OF CERVIX, VIA NATURAL OR ARTIFICIAL OPENING ENDOSCOPIC, DIAGNOSTIC: ICD-10-PCS | Performed by: OBSTETRICS & GYNECOLOGY

## 2018-05-09 RX ORDER — HYDROCODONE BITARTRATE AND ACETAMINOPHEN 5; 325 MG/1; MG/1
1 TABLET ORAL AS NEEDED
Status: DISCONTINUED | OUTPATIENT
Start: 2018-05-09 | End: 2018-05-09

## 2018-05-09 RX ORDER — IBUPROFEN 200 MG
600 TABLET ORAL ONCE AS NEEDED
Status: DISCONTINUED | OUTPATIENT
Start: 2018-05-09 | End: 2018-05-09

## 2018-05-09 RX ORDER — LIDOCAINE HYDROCHLORIDE AND EPINEPHRINE 10; 10 MG/ML; UG/ML
INJECTION, SOLUTION INFILTRATION; PERINEURAL AS NEEDED
Status: DISCONTINUED | OUTPATIENT
Start: 2018-05-09 | End: 2018-05-09 | Stop reason: HOSPADM

## 2018-05-09 RX ORDER — NALOXONE HYDROCHLORIDE 0.4 MG/ML
80 INJECTION, SOLUTION INTRAMUSCULAR; INTRAVENOUS; SUBCUTANEOUS AS NEEDED
Status: DISCONTINUED | OUTPATIENT
Start: 2018-05-09 | End: 2018-05-09

## 2018-05-09 RX ORDER — ONDANSETRON 4 MG/1
4 TABLET, FILM COATED ORAL EVERY 8 HOURS PRN
Status: CANCELLED | OUTPATIENT
Start: 2018-05-09

## 2018-05-09 RX ORDER — DEXAMETHASONE SODIUM PHOSPHATE 4 MG/ML
4 VIAL (ML) INJECTION AS NEEDED
Status: DISCONTINUED | OUTPATIENT
Start: 2018-05-09 | End: 2018-05-09

## 2018-05-09 RX ORDER — HYDROCODONE BITARTRATE AND ACETAMINOPHEN 5; 325 MG/1; MG/1
2 TABLET ORAL AS NEEDED
Status: DISCONTINUED | OUTPATIENT
Start: 2018-05-09 | End: 2018-05-09

## 2018-05-09 RX ORDER — MEPERIDINE HYDROCHLORIDE 25 MG/ML
12.5 INJECTION INTRAMUSCULAR; INTRAVENOUS; SUBCUTANEOUS AS NEEDED
Status: DISCONTINUED | OUTPATIENT
Start: 2018-05-09 | End: 2018-05-09

## 2018-05-09 RX ORDER — ONDANSETRON 2 MG/ML
4 INJECTION INTRAMUSCULAR; INTRAVENOUS EVERY 8 HOURS PRN
Status: CANCELLED | OUTPATIENT
Start: 2018-05-09

## 2018-05-09 RX ORDER — MIDAZOLAM HYDROCHLORIDE 1 MG/ML
1 INJECTION INTRAMUSCULAR; INTRAVENOUS EVERY 5 MIN PRN
Status: DISCONTINUED | OUTPATIENT
Start: 2018-05-09 | End: 2018-05-09

## 2018-05-09 RX ORDER — KETOROLAC TROMETHAMINE 30 MG/ML
15 INJECTION, SOLUTION INTRAMUSCULAR; INTRAVENOUS EVERY 6 HOURS PRN
Status: DISCONTINUED | OUTPATIENT
Start: 2018-05-09 | End: 2018-05-09

## 2018-05-09 RX ORDER — ONDANSETRON 2 MG/ML
4 INJECTION INTRAMUSCULAR; INTRAVENOUS AS NEEDED
Status: DISCONTINUED | OUTPATIENT
Start: 2018-05-09 | End: 2018-05-09

## 2018-05-09 RX ORDER — KETOROLAC TROMETHAMINE 30 MG/ML
30 INJECTION, SOLUTION INTRAMUSCULAR; INTRAVENOUS EVERY 6 HOURS PRN
Status: DISCONTINUED | OUTPATIENT
Start: 2018-05-09 | End: 2018-05-09

## 2018-05-09 RX ORDER — METOCLOPRAMIDE HYDROCHLORIDE 5 MG/ML
10 INJECTION INTRAMUSCULAR; INTRAVENOUS AS NEEDED
Status: DISCONTINUED | OUTPATIENT
Start: 2018-05-09 | End: 2018-05-09

## 2018-05-09 RX ORDER — SODIUM CHLORIDE, SODIUM LACTATE, POTASSIUM CHLORIDE, CALCIUM CHLORIDE 600; 310; 30; 20 MG/100ML; MG/100ML; MG/100ML; MG/100ML
INJECTION, SOLUTION INTRAVENOUS CONTINUOUS
Status: DISCONTINUED | OUTPATIENT
Start: 2018-05-09 | End: 2018-05-09

## 2018-05-09 RX ORDER — FERRIC SUBSULFATE 20-22G/100
SOLUTION, NON-ORAL MISCELLANEOUS AS NEEDED
Status: DISCONTINUED | OUTPATIENT
Start: 2018-05-09 | End: 2018-05-09 | Stop reason: HOSPADM

## 2018-05-09 RX ORDER — ACETAMINOPHEN 500 MG
1000 TABLET ORAL EVERY 6 HOURS PRN
COMMUNITY
End: 2018-06-05 | Stop reason: ALTCHOICE

## 2018-05-09 RX ORDER — HYDROMORPHONE HYDROCHLORIDE 1 MG/ML
0.4 INJECTION, SOLUTION INTRAMUSCULAR; INTRAVENOUS; SUBCUTANEOUS EVERY 5 MIN PRN
Status: DISCONTINUED | OUTPATIENT
Start: 2018-05-09 | End: 2018-05-09

## 2018-05-09 NOTE — ANESTHESIA POSTPROCEDURE EVALUATION
[address], [address] Patient Status:  Hospital Outpatient Surgery   Age/Gender 42year old female MRN [id number]   Location [address] [place] [address] Attending Sisi Sellers MD   [address] Day # 0 PCP Solange Gunter MD

## 2018-05-09 NOTE — ANESTHESIA PREPROCEDURE EVALUATION
PRE-OP EVALUATION    Patient Name: Nora Danielle    Pre-op Diagnosis: CERVICAL DYSPLASIA      Procedure(s):  LOOP ELECTROSURGICAL EXCISION PROCEDURE OF CERVIX, COLPOSCOPY      Surgeon(s) and Role:     * Shameka Keller MD - Primary    Pre-op vitals review GI/Hepatic/Renal    Negative GI/hepatic/renal ROS. Cardiovascular    Negative cardiovascular ROS.                                                    Endo/Other      (+) diabetes and well controlled, type 2, not using insulin

## 2018-05-09 NOTE — OPERATIVE REPORT
OPERATIVE REPORT   PREOPERATIVE DIAGNOSIS: Cervical dysplasia(NA 2-3). POSTOPERATIVE DIAGNOSIS: Same   PROCEDURE PERFORMED:     1. Loop electrocautery excision procedure.      2. Colposcopy  SURGEON:  Jose MARES  ANESTHESIA: Monitored anesth

## 2018-06-05 ENCOUNTER — OFFICE VISIT (OUTPATIENT)
Dept: INTERNAL MEDICINE CLINIC | Facility: CLINIC | Age: 38
End: 2018-06-05

## 2018-06-05 VITALS
BODY MASS INDEX: 26.29 KG/M2 | RESPIRATION RATE: 14 BRPM | WEIGHT: 154 LBS | DIASTOLIC BLOOD PRESSURE: 62 MMHG | TEMPERATURE: 98 F | HEIGHT: 64 IN | SYSTOLIC BLOOD PRESSURE: 114 MMHG | HEART RATE: 66 BPM

## 2018-06-05 DIAGNOSIS — N30.01 ACUTE CYSTITIS WITH HEMATURIA: Primary | ICD-10-CM

## 2018-06-05 PROCEDURE — 81003 URINALYSIS AUTO W/O SCOPE: CPT | Performed by: NURSE PRACTITIONER

## 2018-06-05 PROCEDURE — 87086 URINE CULTURE/COLONY COUNT: CPT | Performed by: NURSE PRACTITIONER

## 2018-06-05 PROCEDURE — 99213 OFFICE O/P EST LOW 20 MIN: CPT | Performed by: NURSE PRACTITIONER

## 2018-06-05 RX ORDER — CIPROFLOXACIN 500 MG/1
500 TABLET, FILM COATED ORAL 2 TIMES DAILY
Qty: 14 TABLET | Refills: 0 | Status: SHIPPED | OUTPATIENT
Start: 2018-06-05 | End: 2018-09-12

## 2018-06-05 NOTE — PROGRESS NOTES
Patient presents with:  UTI: AB RM 7, pt states having uti sym X 1 day       HPI:  Presents with approx 1 day history of dysuria, frequency, lower abd pain, mild hematuria, and urgency. Denies fever/chills, N/V/D, back/flank pain.  Has been treating with OT Sulfate HFA (PROAIR HFA) 108 (90 BASE) MCG/ACT Inhalation Aero Soln INHALE 1 TO 2 PUFFS EVERY 4 TO 6 HOURS AS NEEDED Disp: 1 Inhaler Rfl: 3   Loratadine (CLARITIN) 10 MG Oral Tab Take 10 mg by mouth daily.  Disp:  Rfl:        Physical Exam  /62   Puls

## 2018-08-29 DIAGNOSIS — Z51.81 ENCOUNTER FOR THERAPEUTIC DRUG MONITORING: ICD-10-CM

## 2018-08-29 DIAGNOSIS — E66.9 OBESITY (BMI 30.0-34.9): ICD-10-CM

## 2018-08-31 RX ORDER — PHENTERMINE HYDROCHLORIDE 15 MG/1
CAPSULE ORAL
Qty: 30 CAPSULE | Refills: 1 | OUTPATIENT
Start: 2018-08-31

## 2018-08-31 NOTE — TELEPHONE ENCOUNTER
Requesting     Pending Prescriptions Disp Refills    PHENTERMINE HCL 15 MG Oral Cap [Pharmacy Med Name: Phentermine HCl Oral Capsule 15 MG] 30 capsule 1     Sig: TAKE 1 CAPSULE BY MOUTH IN THE MORNING          LOV:4/23/18  RTC: 3 months  Filled: 4/23/18 #30 with 2 refills    Future Appointments  Date Time Provider Beny Mccray   10/17/2018 11:00 AM INDIO Byrd [facility]

## 2018-09-06 DIAGNOSIS — Z51.81 ENCOUNTER FOR THERAPEUTIC DRUG MONITORING: ICD-10-CM

## 2018-09-06 DIAGNOSIS — E66.9 OBESITY (BMI 30.0-34.9): ICD-10-CM

## 2018-09-06 RX ORDER — PHENTERMINE HYDROCHLORIDE 15 MG/1
15 CAPSULE ORAL EVERY MORNING
Qty: 30 CAPSULE | Refills: 2
Start: 2018-09-06

## 2018-09-06 NOTE — TELEPHONE ENCOUNTER
Requesting Phentermine HCl 15 MG Oral Cap     LOV: 4/23/18  RTC: 3 mth  Last Relevant Labs:   Filled: 4/23/18 #30 with 2 refills      Date Time Provider Beny Mccray   9/12/2018 10:40 AM INDIO Jean Baptiste [facility] [address]   10/17/2018 11:00 AM

## 2018-09-06 NOTE — TELEPHONE ENCOUNTER
From: Pee Durant  Sent: 9/6/2018 6:45 AM CDT  Subject: Medication Renewal Request    Pee Durant would like a refill of the following medications:     Phentermine HCl 15 MG Oral Cap INDIO Olivares]    Preferred pharmacy: [id number]

## 2018-09-12 ENCOUNTER — OFFICE VISIT (OUTPATIENT)
Dept: INTERNAL MEDICINE CLINIC | Facility: CLINIC | Age: 38
End: 2018-09-12

## 2018-09-12 ENCOUNTER — LAB ENCOUNTER (OUTPATIENT)
Dept: LAB | Age: 38
End: 2018-09-12
Attending: NURSE PRACTITIONER
Payer: COMMERCIAL

## 2018-09-12 ENCOUNTER — TELEPHONE (OUTPATIENT)
Dept: INTERNAL MEDICINE CLINIC | Facility: CLINIC | Age: 38
End: 2018-09-12

## 2018-09-12 VITALS
SYSTOLIC BLOOD PRESSURE: 110 MMHG | RESPIRATION RATE: 16 BRPM | DIASTOLIC BLOOD PRESSURE: 70 MMHG | HEIGHT: 64 IN | WEIGHT: 158 LBS | BODY MASS INDEX: 26.98 KG/M2 | HEART RATE: 80 BPM

## 2018-09-12 DIAGNOSIS — R53.82 CHRONIC FATIGUE: ICD-10-CM

## 2018-09-12 DIAGNOSIS — E53.8 B12 DEFICIENCY: ICD-10-CM

## 2018-09-12 DIAGNOSIS — E53.8 VITAMIN B12 DEFICIENCY: ICD-10-CM

## 2018-09-12 DIAGNOSIS — E66.9 OBESITY (BMI 30.0-34.9): ICD-10-CM

## 2018-09-12 DIAGNOSIS — Z51.81 ENCOUNTER FOR THERAPEUTIC DRUG MONITORING: Primary | ICD-10-CM

## 2018-09-12 LAB
ALBUMIN SERPL-MCNC: 4.2 G/DL (ref 3.5–4.8)
ALBUMIN/GLOB SERPL: 1.2 {RATIO} (ref 1–2)
ALP LIVER SERPL-CCNC: 79 U/L (ref 37–98)
ALT SERPL-CCNC: 26 U/L (ref 14–54)
ANION GAP SERPL CALC-SCNC: 7 MMOL/L (ref 0–18)
AST SERPL-CCNC: 18 U/L (ref 15–41)
BASOPHILS # BLD AUTO: 0.05 X10(3) UL (ref 0–0.1)
BASOPHILS NFR BLD AUTO: 1.4 %
BILIRUB SERPL-MCNC: 0.3 MG/DL (ref 0.1–2)
BUN BLD-MCNC: 12 MG/DL (ref 8–20)
BUN/CREAT SERPL: 12.5 (ref 10–20)
CALCIUM BLD-MCNC: 9.1 MG/DL (ref 8.3–10.3)
CHLORIDE SERPL-SCNC: 108 MMOL/L (ref 101–111)
CO2 SERPL-SCNC: 28 MMOL/L (ref 22–32)
CREAT BLD-MCNC: 0.96 MG/DL (ref 0.55–1.02)
EOSINOPHIL # BLD AUTO: 0.22 X10(3) UL (ref 0–0.3)
EOSINOPHIL NFR BLD AUTO: 6.1 %
ERYTHROCYTE [DISTWIDTH] IN BLOOD BY AUTOMATED COUNT: 13.1 % (ref 11.5–16)
GLOBULIN PLAS-MCNC: 3.5 G/DL (ref 2.5–4)
GLUCOSE BLD-MCNC: 72 MG/DL (ref 70–99)
HAV AB SERPL IA-ACNC: 331 PG/ML (ref 193–986)
HCT VFR BLD AUTO: 40.8 % (ref 34–50)
HGB BLD-MCNC: 12.9 G/DL (ref 12–16)
IMMATURE GRANULOCYTE COUNT: 0.01 X10(3) UL (ref 0–1)
IMMATURE GRANULOCYTE RATIO %: 0.3 %
LYMPHOCYTES # BLD AUTO: 1.31 X10(3) UL (ref 0.9–4)
LYMPHOCYTES NFR BLD AUTO: 36.2 %
M PROTEIN MFR SERPL ELPH: 7.7 G/DL (ref 6.1–8.3)
MCH RBC QN AUTO: 28.8 PG (ref 27–33.2)
MCHC RBC AUTO-ENTMCNC: 31.6 G/DL (ref 31–37)
MCV RBC AUTO: 91.1 FL (ref 81–100)
MONOCYTES # BLD AUTO: 0.52 X10(3) UL (ref 0.1–1)
MONOCYTES NFR BLD AUTO: 14.4 %
NEUTROPHIL ABS PRELIM: 1.51 X10 (3) UL (ref 1.3–6.7)
NEUTROPHILS # BLD AUTO: 1.51 X10(3) UL (ref 1.3–6.7)
NEUTROPHILS NFR BLD AUTO: 41.6 %
OSMOLALITY SERPL CALC.SUM OF ELEC: 294 MOSM/KG (ref 275–295)
PLATELET # BLD AUTO: 286 10(3)UL (ref 150–450)
POTASSIUM SERPL-SCNC: 4.3 MMOL/L (ref 3.6–5.1)
RBC # BLD AUTO: 4.48 X10(6)UL (ref 3.8–5.1)
RED CELL DISTRIBUTION WIDTH-SD: 43.5 FL (ref 35.1–46.3)
SODIUM SERPL-SCNC: 143 MMOL/L (ref 136–144)
T4 FREE SERPL-MCNC: 0.9 NG/DL (ref 0.9–1.8)
TSI SER-ACNC: 1.15 MIU/ML (ref 0.35–5.5)
VIT D+METAB SERPL-MCNC: 24.2 NG/ML (ref 30–100)
WBC # BLD AUTO: 3.6 X10(3) UL (ref 4–13)

## 2018-09-12 PROCEDURE — 82306 VITAMIN D 25 HYDROXY: CPT

## 2018-09-12 PROCEDURE — 80053 COMPREHEN METABOLIC PANEL: CPT

## 2018-09-12 PROCEDURE — 82607 VITAMIN B-12: CPT

## 2018-09-12 PROCEDURE — 99214 OFFICE O/P EST MOD 30 MIN: CPT | Performed by: NURSE PRACTITIONER

## 2018-09-12 PROCEDURE — 84443 ASSAY THYROID STIM HORMONE: CPT

## 2018-09-12 PROCEDURE — 84439 ASSAY OF FREE THYROXINE: CPT

## 2018-09-12 PROCEDURE — 85025 COMPLETE CBC W/AUTO DIFF WBC: CPT

## 2018-09-12 RX ORDER — BUPROPION HYDROCHLORIDE 100 MG/1
200 TABLET ORAL 2 TIMES DAILY
Qty: 360 TABLET | Refills: 1 | Status: SHIPPED | OUTPATIENT
Start: 2018-09-12 | End: 2018-09-12

## 2018-09-12 RX ORDER — DIETHYLPROPION HYDROCHLORIDE 75 MG/1
1 TABLET ORAL EVERY MORNING
Qty: 30 TABLET | Refills: 0 | Status: SHIPPED | OUTPATIENT
Start: 2018-09-12 | End: 2018-10-24

## 2018-09-12 RX ORDER — METFORMIN HYDROCHLORIDE 750 MG/1
1500 TABLET, EXTENDED RELEASE ORAL
Qty: 60 TABLET | Refills: 5 | Status: SHIPPED | OUTPATIENT
Start: 2018-09-12 | End: 2018-09-12

## 2018-09-12 RX ORDER — PHENTERMINE HYDROCHLORIDE 15 MG/1
15 CAPSULE ORAL EVERY MORNING
Qty: 30 CAPSULE | Refills: 2 | Status: CANCELLED | OUTPATIENT
Start: 2018-09-12

## 2018-09-12 RX ORDER — BUPROPION HYDROCHLORIDE 100 MG/1
200 TABLET ORAL 2 TIMES DAILY
Qty: 360 TABLET | Refills: 1 | Status: SHIPPED | OUTPATIENT
Start: 2018-09-12 | End: 2019-05-22

## 2018-09-12 RX ORDER — METFORMIN HYDROCHLORIDE 750 MG/1
1500 TABLET, EXTENDED RELEASE ORAL
Qty: 180 TABLET | Refills: 1 | Status: SHIPPED | OUTPATIENT
Start: 2018-09-12 | End: 2019-02-21

## 2018-09-12 NOTE — PATIENT INSTRUCTIONS
Continue making lifestyle changes that focus on good nutrition, regular exercise and stress management. Medication Plan: Continue current medication regimen aside from add diethylpropion daily in AM. Remain off phentermine. Visit yourweightmatters. or that you can stick to your plan and meet your goals:  · If you don’t meet a goal, don’t use it as an excuse to give up on your whole plan. Adjust your goal and try again.   · Try to understand your own attitude about food.  Are you subject to emotional eati

## 2018-09-12 NOTE — TELEPHONE ENCOUNTER
Received request for Prior auth for Diethylpropion. Called and notified pt that we do not do PA's for this medication.  She can use good [email or web address] or go to [employer]

## 2018-09-12 NOTE — PROGRESS NOTES
Aris Melgoza is a 34year old female presents today for follow-up on medical weight loss program for the treatment of overweight, obesity, or morbid obesity associated elevated leptin. Patient has lost -4# since LOV 5 month ago.  Hx of phentermine use normal mood and affect    ASSESSMENT AND PLAN:  Encounter for therapeutic drug monitoring  (primary encounter diagnosis)  Obesity (bmi 30.0-34. 9)  Chronic fatigue  Vitamin b12 deficiency    Orders Placed This Encounter      TSH and Free T4      CBC With Leopold Martens and education. Schedule fitness to remain accountable and provide regular self care. Complete non fasting labs to evaluate fatigue. Track nutrition to identify caloric intake- bring to follow up visit.     Weight Management: Take It Off and Keep It Off  It’ compliments. Even if you get embarrassed, just say “thank you.”  · Make a list of the things that others like about you and that you like about yourself. Add something new from time to time. Keep this list to look at when you need a lift.   Resources  · The

## 2018-10-01 RX ORDER — MONTELUKAST SODIUM 10 MG/1
TABLET ORAL
Qty: 90 TABLET | Refills: 0 | Status: SHIPPED | OUTPATIENT
Start: 2018-10-01 | End: 2018-10-24

## 2018-10-05 ENCOUNTER — TELEPHONE (OUTPATIENT)
Dept: INTERNAL MEDICINE CLINIC | Facility: CLINIC | Age: 38
End: 2018-10-05

## 2018-10-05 DIAGNOSIS — Z00.00 ROUTINE GENERAL MEDICAL EXAMINATION AT A HEALTH CARE FACILITY: Primary | ICD-10-CM

## 2018-10-05 NOTE — TELEPHONE ENCOUNTER
Future Appointments   Date Time Provider Beny Mccray   10/17/2018 11:00 AM INDIO Dodson [facility]   10/22/2018 11:30 AM Bob Liu MD [facility] OF THE [facility]   10/24/2018  9:00 AM INDIO Mccartney [facility] [address] [facility]

## 2018-10-05 NOTE — TELEPHONE ENCOUNTER
Future Appointments   Date Time Provider Beny Mccray   10/17/2018 11:00 AM INDIO Miller [facility] [address]   10/22/2018 11:30 AM Diane Swan MD [facility] OF THE [facility]   10/24/2018  9:00 AM INDIO Pritchard [facility] [address] [facility] IM

## 2018-10-19 PROBLEM — Z90.49 HISTORY OF CHOLECYSTECTOMY: Status: ACTIVE | Noted: 2018-10-19

## 2018-10-19 PROBLEM — B97.7 HPV (HUMAN PAPILLOMA VIRUS) INFECTION: Status: ACTIVE | Noted: 2018-10-19

## 2018-10-19 PROBLEM — Z98.891 HISTORY OF C-SECTION: Status: ACTIVE | Noted: 2018-10-19

## 2018-10-19 PROBLEM — R87.619 ABNORMAL PAP SMEAR OF CERVIX: Status: ACTIVE | Noted: 2018-10-19

## 2018-10-19 PROBLEM — Z98.890 H/O LEEP: Status: ACTIVE | Noted: 2018-10-19

## 2018-10-22 PROCEDURE — 88175 CYTOPATH C/V AUTO FLUID REDO: CPT | Performed by: OBSTETRICS & GYNECOLOGY

## 2018-10-22 PROCEDURE — 87624 HPV HI-RISK TYP POOLED RSLT: CPT | Performed by: OBSTETRICS & GYNECOLOGY

## 2018-10-24 ENCOUNTER — OFFICE VISIT (OUTPATIENT)
Dept: INTERNAL MEDICINE CLINIC | Facility: CLINIC | Age: 38
End: 2018-10-24

## 2018-10-24 VITALS
OXYGEN SATURATION: 95 % | WEIGHT: 156 LBS | RESPIRATION RATE: 16 BRPM | TEMPERATURE: 97 F | SYSTOLIC BLOOD PRESSURE: 118 MMHG | DIASTOLIC BLOOD PRESSURE: 70 MMHG | BODY MASS INDEX: 26.63 KG/M2 | HEART RATE: 80 BPM | HEIGHT: 64.25 IN

## 2018-10-24 VITALS
DIASTOLIC BLOOD PRESSURE: 60 MMHG | SYSTOLIC BLOOD PRESSURE: 100 MMHG | BODY MASS INDEX: 26.63 KG/M2 | HEIGHT: 64 IN | HEART RATE: 80 BPM | WEIGHT: 156 LBS | RESPIRATION RATE: 16 BRPM

## 2018-10-24 DIAGNOSIS — Z00.00 ROUTINE GENERAL MEDICAL EXAMINATION AT A HEALTH CARE FACILITY: Primary | ICD-10-CM

## 2018-10-24 DIAGNOSIS — E53.8 VITAMIN B12 DEFICIENCY: ICD-10-CM

## 2018-10-24 DIAGNOSIS — F41.1 ANXIETY STATE: ICD-10-CM

## 2018-10-24 DIAGNOSIS — Z51.81 ENCOUNTER FOR THERAPEUTIC DRUG MONITORING: Primary | ICD-10-CM

## 2018-10-24 DIAGNOSIS — J45.20 MILD INTERMITTENT ASTHMA WITHOUT COMPLICATION: ICD-10-CM

## 2018-10-24 DIAGNOSIS — E66.9 OBESITY (BMI 30.0-34.9): ICD-10-CM

## 2018-10-24 PROCEDURE — 96372 THER/PROPH/DIAG INJ SC/IM: CPT | Performed by: NURSE PRACTITIONER

## 2018-10-24 PROCEDURE — 99396 PREV VISIT EST AGE 40-64: CPT | Performed by: NURSE PRACTITIONER

## 2018-10-24 PROCEDURE — 99213 OFFICE O/P EST LOW 20 MIN: CPT | Performed by: NURSE PRACTITIONER

## 2018-10-24 RX ORDER — MONTELUKAST SODIUM 10 MG/1
TABLET ORAL
Qty: 90 TABLET | Refills: 3 | Status: SHIPPED | OUTPATIENT
Start: 2018-10-24 | End: 2019-10-24

## 2018-10-24 RX ORDER — DIETHYLPROPION HYDROCHLORIDE 75 MG/1
1 TABLET ORAL EVERY MORNING
Qty: 30 TABLET | Refills: 1 | Status: SHIPPED | OUTPATIENT
Start: 2018-10-24 | End: 2019-01-24

## 2018-10-24 RX ORDER — DIETHYLPROPION HYDROCHLORIDE 75 MG/1
TABLET ORAL
COMMUNITY
End: 2019-01-16

## 2018-10-24 RX ORDER — CYANOCOBALAMIN 1000 UG/ML
1000 INJECTION INTRAMUSCULAR; SUBCUTANEOUS ONCE
Status: COMPLETED | OUTPATIENT
Start: 2018-10-24 | End: 2018-10-24

## 2018-10-24 RX ADMIN — CYANOCOBALAMIN 1000 MCG: 1000 INJECTION INTRAMUSCULAR; SUBCUTANEOUS at 11:09:00

## 2018-10-24 NOTE — PROGRESS NOTES
Simeon Muse is a 34year old female who presents for a complete physical exam:       Patient complains of:      She is doing great. Weight is stable through weight loss clinic.         Allergic trigger for asthma her cat is still an issue but well contr 3   Ondansetron HCl (ZOFRAN) 4 mg tablet Take 1 tablet (4 mg total) by mouth every 8 (eight) hours as needed for Nausea. Disp: 20 tablet Rfl: 0   Levonorgestrel (MIRENA, 52 MG,) 20 MCG/24HR Intrauterine IUD 1 each by Intrauterine route once.  Disp:  Rfl: breath with exertion  CARDIOVASCULAR: denies chest pain on exertion  GI: denies abdominal pain,denies heartburn  : denies dysuria, vaginal discharge or itching  MUSCULOSKELETAL: denies back pain  NEURO: denies headaches  PSYCH: no c/o      EXAM:

## 2018-10-24 NOTE — PATIENT INSTRUCTIONS
Continue making lifestyle changes that focus on good nutrition, regular exercise and stress management. Medication Plan: Continue current medication regimen, aside from reduce Metformin to 1 tab daily and take at dinner.     Great work with adding ruthann

## 2018-10-24 NOTE — PROGRESS NOTES
Claire Ramirez is a 34year old female presents today for follow-up on medical weight loss program for the treatment of overweight, obesity, or morbid obesity associated elevated leptin. Patient has lost 2# since LOV 1 month ago.  Compliant with medicati pleasant, cooperative, normal mood and affect    ASSESSMENT AND PLAN:  Encounter for therapeutic drug monitoring  (primary encounter diagnosis)  Obesity (bmi 30.0-34. 9)  Vitamin b12 deficiency    No orders of the defined types were placed in this encounter

## 2018-11-20 ENCOUNTER — E-VISIT (OUTPATIENT)
Dept: FAMILY MEDICINE CLINIC | Facility: CLINIC | Age: 38
End: 2018-11-20

## 2018-11-20 DIAGNOSIS — R39.9 UTI SYMPTOMS: Primary | ICD-10-CM

## 2018-11-20 PROCEDURE — 98969 ONLINE SERVICE BY HC PRO: CPT | Performed by: PHYSICIAN ASSISTANT

## 2018-11-20 RX ORDER — NITROFURANTOIN 25; 75 MG/1; MG/1
100 CAPSULE ORAL 2 TIMES DAILY
Qty: 14 CAPSULE | Refills: 0 | Status: SHIPPED | OUTPATIENT
Start: 2018-11-20 | End: 2018-11-27

## 2018-11-26 RX ORDER — ONDANSETRON 4 MG/1
4 TABLET, FILM COATED ORAL EVERY 8 HOURS PRN
Qty: 20 TABLET | Refills: 0 | Status: SHIPPED | OUTPATIENT
Start: 2018-11-26 | End: 2020-07-15

## 2018-11-26 NOTE — TELEPHONE ENCOUNTER
Last Office Visit: 1-24-18 with SD for cpe  Last Rx Filled: 2-12-18 20 tabs with no refills  Last Labs: 9-12-18 vitamin D/cbc/cmp/tsh/t4/vitamin B12  Future Appointment: none    Per protocol to provider

## 2018-12-17 ENCOUNTER — TELEPHONE (OUTPATIENT)
Dept: INTERNAL MEDICINE CLINIC | Facility: CLINIC | Age: 38
End: 2018-12-17

## 2018-12-17 NOTE — TELEPHONE ENCOUNTER
Pt scheduled an appt via [facility] for abdominal pain, Please advise    ----- Message -----   From: Adrian Vinson: 12/15/2018  10:27 PM   To: [address]   Subject: Appointment scheduled from [place] [address]       Appointment For: Zo Dominguez

## 2018-12-17 NOTE — TELEPHONE ENCOUNTER
Spoke with patient stating a couple weeks ago had episode of sharp stabbing upper abdominal pain first thing in the morning that radiated to the back, felt shortness of breath like something squeezing her stomach.  Patient indicates this happened again abou

## 2019-01-16 ENCOUNTER — OFFICE VISIT (OUTPATIENT)
Dept: INTERNAL MEDICINE CLINIC | Facility: CLINIC | Age: 39
End: 2019-01-16

## 2019-01-16 VITALS
TEMPERATURE: 99 F | HEIGHT: 64 IN | DIASTOLIC BLOOD PRESSURE: 70 MMHG | HEART RATE: 96 BPM | BODY MASS INDEX: 26.8 KG/M2 | SYSTOLIC BLOOD PRESSURE: 100 MMHG | WEIGHT: 157 LBS

## 2019-01-16 DIAGNOSIS — K22.4 ESOPHAGEAL SPASM: ICD-10-CM

## 2019-01-16 DIAGNOSIS — R10.13 DYSPEPSIA: Primary | ICD-10-CM

## 2019-01-16 DIAGNOSIS — J45.20 MILD INTERMITTENT ASTHMA WITHOUT COMPLICATION: ICD-10-CM

## 2019-01-16 PROCEDURE — 99213 OFFICE O/P EST LOW 20 MIN: CPT | Performed by: NURSE PRACTITIONER

## 2019-01-16 RX ORDER — RANITIDINE 150 MG/1
150 TABLET ORAL 2 TIMES DAILY
Qty: 180 TABLET | Refills: 1 | Status: SHIPPED | OUTPATIENT
Start: 2019-01-16 | End: 2019-05-22

## 2019-01-16 NOTE — PROGRESS NOTES
Megan Verdugo is a 34year old female. Patient presents with:  Abdominal Pain: LG. Room 10. Epigastric pain a week or 2 after Derrick.  She's been taking zantac and its helped and hasnt had the pain since      HPI:   Presents for eval of sudden onset ab MAXIMUM OF 2 TABLETS IN 24 HOURS Disp: 9 tablet Rfl: 3   Levonorgestrel (MIRENA, 52 MG,) 20 MCG/24HR Intrauterine IUD 1 each by Intrauterine route once. Disp:  Rfl:    Vitamin D3 2000 UNITS Oral Cap Take 4,000 Units by mouth daily.  Disp:  Rfl:    Albuterol tone  PSYCH: alert and oriented x 3    ASSESSMENT AND PLAN:     Mild intermittent asthma without complication  Stable  Score 25  singulair daily with proair prn. Dyspepsia  (primary encounter diagnosis) zantac  Increase to bid.   See me if recurrent    Es

## 2019-01-24 ENCOUNTER — OFFICE VISIT (OUTPATIENT)
Dept: INTERNAL MEDICINE CLINIC | Facility: CLINIC | Age: 39
End: 2019-01-24

## 2019-01-24 VITALS
HEIGHT: 64 IN | SYSTOLIC BLOOD PRESSURE: 120 MMHG | WEIGHT: 159 LBS | DIASTOLIC BLOOD PRESSURE: 80 MMHG | BODY MASS INDEX: 27.14 KG/M2 | RESPIRATION RATE: 14 BRPM | HEART RATE: 68 BPM

## 2019-01-24 DIAGNOSIS — E53.8 B12 DEFICIENCY: ICD-10-CM

## 2019-01-24 DIAGNOSIS — E66.9 OBESITY (BMI 30.0-34.9): ICD-10-CM

## 2019-01-24 DIAGNOSIS — Z51.81 ENCOUNTER FOR THERAPEUTIC DRUG MONITORING: Primary | ICD-10-CM

## 2019-01-24 PROCEDURE — 99213 OFFICE O/P EST LOW 20 MIN: CPT | Performed by: NURSE PRACTITIONER

## 2019-01-24 PROCEDURE — 96372 THER/PROPH/DIAG INJ SC/IM: CPT | Performed by: NURSE PRACTITIONER

## 2019-01-24 RX ORDER — LIRAGLUTIDE 6 MG/ML
3 INJECTION, SOLUTION SUBCUTANEOUS DAILY
Qty: 5 PEN | Refills: 1 | Status: SHIPPED | OUTPATIENT
Start: 2019-01-24 | End: 2019-02-21

## 2019-01-24 RX ORDER — CYANOCOBALAMIN 1000 UG/ML
1000 INJECTION INTRAMUSCULAR; SUBCUTANEOUS ONCE
Status: COMPLETED | OUTPATIENT
Start: 2019-01-24 | End: 2019-01-24

## 2019-01-24 RX ORDER — PEN NEEDLE, DIABETIC 30 GX3/16"
1 NEEDLE, DISPOSABLE MISCELLANEOUS DAILY
Qty: 100 EACH | Refills: 1 | Status: SHIPPED | OUTPATIENT
Start: 2019-01-24 | End: 2019-07-30

## 2019-01-24 RX ORDER — DIETHYLPROPION HYDROCHLORIDE 75 MG/1
1 TABLET ORAL EVERY MORNING
Qty: 30 TABLET | Refills: 1 | Status: SHIPPED | OUTPATIENT
Start: 2019-01-24 | End: 2019-02-21 | Stop reason: ALTCHOICE

## 2019-01-24 RX ADMIN — CYANOCOBALAMIN 1000 MCG: 1000 INJECTION INTRAMUSCULAR; SUBCUTANEOUS at 10:25:00

## 2019-01-24 NOTE — PROGRESS NOTES
Ronda Waddell is a 34year old female presents today for follow-up on medical weight loss program for the treatment of overweight, obesity, or morbid obesity associated elevated leptin. Patient has lost -3# since LOV 3 month ago.  Compliant with medicat diagnosis)  Obesity (bmi 30.0-34. 9)  B12 deficiency    No orders of the defined types were placed in this encounter.       Meds & Refills for this Visit:  Requested Prescriptions     Signed Prescriptions Disp Refills   • Diethylpropion HCl ER 75 MG Oral Tab accountability.     Understanding the Link Between Biology, Weight and Health    September 14, 2016 • Posted in Armando Sahni, Exercise, Motivation, Nutrition, Weight-loss Options • By Your [place] Matters Campaign    Within our culture, we often have a way of thinki foods while promoting sedentary levels and low activity  Our weight affects our health, not just our appearance – Although weight is a concern for many people who care deeply about their visual appearance, we must also bring health into the bigger picture. link between your biology, weight and health? You can watch the full video online as Dr. Alyson Love speaks to attendees at the [facility] 2015 Your Weight Matters National Convention in [place], [place].  Cut and paste or click the link below:  [email or web address]

## 2019-01-24 NOTE — PROGRESS NOTES
Zoraida Barrios is covered, but a prior authorization (PA) is needed  Your patient can expect to pay $25.00  Patient teaching on [address] [place] [address]. Patient aware of the directions of how to titrate the dose on this medication.    Start therapy: Week 1- 0.6mg daily

## 2019-01-24 NOTE — PATIENT INSTRUCTIONS
Continue making lifestyle changes that focus on good nutrition, regular exercise and stress management.     Medication Plan: Continue current medication regimen, aside from add Saxenda daily as directed and tolerated:  Week 1- .6mg daily  Week 2- 1.2mg kyle Stress/mood – Our mental health affects our hunger, sleep cycle and other important brain functions   Medications – Many medications are associated with weight gain such as insulin, oral medications for diabetes, antidepressants and more   Metabolic adap going to bed earlier, relaxing before bedtime or drinking calming tea in the evenings. Monitor food intake – Be aware of what’s going into your body and how much. Are you controlling your portions?  Eating foods that are less-processed and rich with nutri

## 2019-01-30 ENCOUNTER — TELEPHONE (OUTPATIENT)
Dept: INTERNAL MEDICINE CLINIC | Facility: CLINIC | Age: 39
End: 2019-01-30

## 2019-01-30 NOTE — TELEPHONE ENCOUNTER
Request for PA for Hailey Moffett started today  KEY [id number]  Information entered on CMM    Await response

## 2019-02-05 NOTE — TELEPHONE ENCOUNTER
CaseId:[id number];Status:Denied; Review Type:Prior Auth; Appeal Information: Attention:ATTN: [address] [id number].  KAYLEY PHELPS,[id number] OYWEO:[phone] ZCV:[phone];

## 2019-02-06 NOTE — TELEPHONE ENCOUNTER
Patient called and said she did get the medication for a cost of $24.00 She is doing well and will continue.

## 2019-02-06 NOTE — TELEPHONE ENCOUNTER
Please let her known that it is not covered, she can look into Belviq XR. But my guess is it is likely not covered either since its a branded weight loss drug.  In that case, have her resume tracking nutrition and resume exercise with strength training in t

## 2019-02-12 PROCEDURE — 88175 CYTOPATH C/V AUTO FLUID REDO: CPT | Performed by: OBSTETRICS & GYNECOLOGY

## 2019-02-12 PROCEDURE — 87624 HPV HI-RISK TYP POOLED RSLT: CPT | Performed by: OBSTETRICS & GYNECOLOGY

## 2019-02-21 ENCOUNTER — OFFICE VISIT (OUTPATIENT)
Dept: INTERNAL MEDICINE CLINIC | Facility: CLINIC | Age: 39
End: 2019-02-21

## 2019-02-21 VITALS
HEART RATE: 84 BPM | RESPIRATION RATE: 14 BRPM | HEIGHT: 64 IN | DIASTOLIC BLOOD PRESSURE: 70 MMHG | WEIGHT: 151 LBS | BODY MASS INDEX: 25.78 KG/M2 | SYSTOLIC BLOOD PRESSURE: 100 MMHG

## 2019-02-21 DIAGNOSIS — Z51.81 ENCOUNTER FOR THERAPEUTIC DRUG MONITORING: Primary | ICD-10-CM

## 2019-02-21 DIAGNOSIS — E66.9 OBESITY (BMI 30.0-34.9): ICD-10-CM

## 2019-02-21 PROCEDURE — 99213 OFFICE O/P EST LOW 20 MIN: CPT | Performed by: NURSE PRACTITIONER

## 2019-02-21 RX ORDER — LIRAGLUTIDE 6 MG/ML
3 INJECTION, SOLUTION SUBCUTANEOUS DAILY
Qty: 5 PEN | Refills: 5 | Status: SHIPPED | OUTPATIENT
Start: 2019-02-21 | End: 2019-07-30

## 2019-02-21 NOTE — PROGRESS NOTES
Mango Higginbotham is a 34year old female presents today for follow-up on medical weight loss program for the treatment of overweight, obesity, or morbid obesity associated elevated leptin. Patient has lost 8# since LOV 1 month ago.  Compliant with medicati intact  PSYCH: pleasant, cooperative, normal mood and affect    ASSESSMENT AND PLAN:  Encounter for therapeutic drug monitoring  (primary encounter diagnosis)  Obesity (bmi 30.0-34.9)    No orders of the defined types were placed in this encounter.       Me

## 2019-02-21 NOTE — PATIENT INSTRUCTIONS
Continue making lifestyle changes that focus on good nutrition, regular exercise and stress management. Medication Plan: Continue current medication regimen, aside from stop diethylpropion ER. Remain off Metformin.     Start exercise as planned, increase

## 2019-05-22 ENCOUNTER — OFFICE VISIT (OUTPATIENT)
Dept: INTERNAL MEDICINE CLINIC | Facility: CLINIC | Age: 39
End: 2019-05-22

## 2019-05-22 VITALS
HEART RATE: 80 BPM | BODY MASS INDEX: 26.8 KG/M2 | RESPIRATION RATE: 14 BRPM | HEIGHT: 64 IN | WEIGHT: 157 LBS | SYSTOLIC BLOOD PRESSURE: 100 MMHG | DIASTOLIC BLOOD PRESSURE: 60 MMHG

## 2019-05-22 DIAGNOSIS — F43.9 STRESS: ICD-10-CM

## 2019-05-22 DIAGNOSIS — E66.9 OBESITY (BMI 30.0-34.9): ICD-10-CM

## 2019-05-22 DIAGNOSIS — Z51.81 ENCOUNTER FOR THERAPEUTIC DRUG MONITORING: Primary | ICD-10-CM

## 2019-05-22 PROCEDURE — 99214 OFFICE O/P EST MOD 30 MIN: CPT | Performed by: NURSE PRACTITIONER

## 2019-05-22 RX ORDER — BUPROPION HYDROCHLORIDE 100 MG/1
200 TABLET ORAL 2 TIMES DAILY
Qty: 360 TABLET | Refills: 1 | Status: SHIPPED | OUTPATIENT
Start: 2019-05-22 | End: 2019-07-30

## 2019-05-22 RX ORDER — DIETHYLPROPION HYDROCHLORIDE 75 MG/1
1 TABLET ORAL EVERY MORNING
Qty: 30 TABLET | Refills: 0 | Status: SHIPPED | OUTPATIENT
Start: 2019-05-22 | End: 2019-07-08

## 2019-05-22 NOTE — PATIENT INSTRUCTIONS
Continue making lifestyle changes that focus on good nutrition, regular exercise and stress management.     Medication Plan: Restart diethylpropion ER daily in AM and restart Metformin ER 1 tab daily with meal for 7 days, then increase to 2 tabs daily as pr feel less hungry as your blood flows away from the internal organs and to your large muscles to prepare for “fight or flight.” However, once the effects of adrenaline wear off, cortisol, known as the “stress hormone,” hangs around and starts signaling the feeling we get when we’re stressed.  While we may burn off some extra calories fidgeting or running around cleaning because we can’t sit still, anxiety can also trigger “emotional eating.” Overeating or eating unhealthy foods in response to stress or as a w of Tahir Britton study showed, in laboratory mice, that being “stressed” by exposure to the smell of a predator lead the mice to eat more high-fat food pellets, when given the choice of eating these instead of normal feed.   Less Sleep  Do you ever including its sight, texture or smell. You also learn to tune into your subjective feelings of hunger or fullness, rather than eating just because it’s a mealtime or because there is food in front of you.  A well-designed study of binge-eaters showed that p feeling completely burned out and you don't have much drive left, fill-up your tank with these motivational tips. How to Bust through the [address], Set a Non Scale-related Goal  Don't let the scale have total control of your actions.  Set a NE success.

## 2019-05-22 NOTE — PROGRESS NOTES
Skyler Garrido is a 34year old female presents today for follow-up on medical weight loss program for the treatment of overweight, obesity, or morbid obesity associated elevated leptin. Patient has lost -6# since LOV 2 month ago.  Compliant with medicat without clicks or gallops, no pedal edema.    GI: +BS, soft  NEURO/MS: motor and sensory grossly intact  PSYCH: pleasant, cooperative, normal mood and affect    ASSESSMENT AND PLAN:  Encounter for therapeutic drug monitoring  (primary encounter diagnosis) a tub of ice cream while you brood about your latest romantic rejection or eating a hamburger and fries in front of your computer as you furiously try to make a work deadline?  Perhaps you’re a busy mom, eating cookies in your car as you shuttle the kids ba update, so your brain is still going to tell you to reach for that plate of cookies anyway. Belly Fat  In the days when our ancestors were fighting off tigers and famine, their bodies adapted by learning to store fat supplies for the long haul.  The unfort can also make you eat more “mindlessly” as you churn around worrying thoughts in your head, not even focusing on the taste of the food, how much you’ve eaten, or when you are feeling full.  When you eat mindlessly, you will likely eat more, yet feel less sa Stress causes decreased blood sugar, which leads to fatigue. If you drink coffee or caffeinated soft drinks to stay awake, or alcohol to feel better, your sleep cycle will be even more disrupted.  Sleep is also a powerful factor influencing weight gain or l stress helps you to feel refreshed, lets you think more clearly, and improves your mood, so you are less likely to overeat.   Write in a Journal  Writing down your experiences and reactions or your most important goals keeps your hands busy and your mind oc craving to break up the monotony of structured meals. Give Yourself Something You Need  All work and no play isn't very motivating.  You've put the pedal to the medal, so reward yourself with something you might really want or need (not food-related) to [facility]

## 2019-06-24 RX ORDER — DIETHYLPROPION HYDROCHLORIDE 75 MG/1
TABLET ORAL
Qty: 30 TABLET | Refills: 0 | OUTPATIENT
Start: 2019-06-24

## 2019-07-03 ENCOUNTER — PATIENT MESSAGE (OUTPATIENT)
Dept: INTERNAL MEDICINE CLINIC | Facility: CLINIC | Age: 39
End: 2019-07-03

## 2019-07-03 DIAGNOSIS — Z51.81 ENCOUNTER FOR THERAPEUTIC DRUG MONITORING: ICD-10-CM

## 2019-07-03 DIAGNOSIS — E66.9 OBESITY (BMI 30.0-34.9): ICD-10-CM

## 2019-07-08 NOTE — TELEPHONE ENCOUNTER
Please let her know I will approve this one refill, but in the future she needs to make sure to schedule f/u as advised so she does not run out of medication.

## 2019-07-08 NOTE — TELEPHONE ENCOUNTER
From: Saumya Michel  To: LORETTA Jean Baptiste  Sent: 7/3/2019 2:09 PM CDT  Subject: Prescription Question    Hello,    I am out of diethylproprion. I tried to refill through my pharmacy but it did not go through.  I have an appointment at the end of July NAUSEA

## 2019-07-08 NOTE — PROGRESS NOTES
Requesting  Diethylpropion HCl ER 75 MG Oral Tablet 24 Hr  LOV: 5/22  RTC:1 mth  Last Relevant Labs:   Filled: 5/22/19 #30 with 0 refills    Future Appointments   Date Time Provider Beny Mccray   7/30/2019  9:20 AM LORETTA Michelle [facility]

## 2019-07-09 RX ORDER — DIETHYLPROPION HYDROCHLORIDE 75 MG/1
1 TABLET ORAL EVERY MORNING
Qty: 30 TABLET | Refills: 0 | OUTPATIENT
Start: 2019-07-09 | End: 2019-07-30

## 2019-07-11 ENCOUNTER — PATIENT MESSAGE (OUTPATIENT)
Dept: INTERNAL MEDICINE CLINIC | Facility: CLINIC | Age: 39
End: 2019-07-11

## 2019-07-11 DIAGNOSIS — Z51.81 ENCOUNTER FOR THERAPEUTIC DRUG MONITORING: ICD-10-CM

## 2019-07-11 DIAGNOSIS — E66.9 OBESITY (BMI 30.0-34.9): ICD-10-CM

## 2019-07-11 NOTE — TELEPHONE ENCOUNTER
From: Aleksandr Lang  To: LORETTA Gonzales  Sent: 7/11/2019 10:06 AM CDT  Subject: Prescription Question    Hello! Following up on the Bupropion refill. I have not heard from my pharmacy that it has been filled.     Thank you,   Tucker haddad

## 2019-07-11 NOTE — TELEPHONE ENCOUNTER
From: Maynor Kerr  To: LORETTA García  Sent: 7/11/2019 3:36 PM CDT  Subject: Prescription Question    Hello again!      I accidentally asked for a follow up on the bupropion but I actually meant the diethylproprion that I wrote in about a few day

## 2019-07-12 RX ORDER — DIETHYLPROPION HYDROCHLORIDE 75 MG/1
TABLET ORAL
Qty: 30 TABLET | Refills: 0 | OUTPATIENT
Start: 2019-07-12

## 2019-07-12 NOTE — TELEPHONE ENCOUNTER
Requesting   Requested Prescriptions     Pending Prescriptions Disp Refills   • DIETHYLPROPION HCL ER 75 MG Oral Tablet 24 Hr [Pharmacy Med Name: Diethylpropion HCl ER Oral Tablet Extended Release 24 Hour 75 MG] 30 tablet 0     Sig: TAKE 1 TABLET BY MOUTH

## 2019-07-26 DIAGNOSIS — Z51.81 ENCOUNTER FOR THERAPEUTIC DRUG MONITORING: ICD-10-CM

## 2019-07-26 DIAGNOSIS — E66.9 OBESITY (BMI 30.0-34.9): ICD-10-CM

## 2019-07-29 RX ORDER — BUPROPION HYDROCHLORIDE 100 MG/1
TABLET ORAL
Qty: 360 TABLET | Refills: 0 | Status: SHIPPED | OUTPATIENT
Start: 2019-07-29 | End: 2019-11-24

## 2019-07-29 NOTE — TELEPHONE ENCOUNTER
Requesting Bupropioin  LOV: 5/22/19  RTC: one month  Last Relevant Labs: n/a  Filled: 5/22/19 #360 with 1 refills    Future Appointments   Date Time Provider Beny Mccray   7/30/2019  9:20 AM LORETTA Logan [facility] [address]   8/20/2019  9:0

## 2019-07-30 ENCOUNTER — OFFICE VISIT (OUTPATIENT)
Dept: INTERNAL MEDICINE CLINIC | Facility: CLINIC | Age: 39
End: 2019-07-30

## 2019-07-30 VITALS
SYSTOLIC BLOOD PRESSURE: 110 MMHG | HEART RATE: 70 BPM | RESPIRATION RATE: 14 BRPM | DIASTOLIC BLOOD PRESSURE: 74 MMHG | HEIGHT: 64 IN | WEIGHT: 161 LBS | BODY MASS INDEX: 27.49 KG/M2

## 2019-07-30 DIAGNOSIS — E66.9 OBESITY (BMI 30.0-34.9): ICD-10-CM

## 2019-07-30 DIAGNOSIS — Z51.81 ENCOUNTER FOR THERAPEUTIC DRUG MONITORING: Primary | ICD-10-CM

## 2019-07-30 PROCEDURE — 99213 OFFICE O/P EST LOW 20 MIN: CPT | Performed by: NURSE PRACTITIONER

## 2019-07-30 RX ORDER — METFORMIN HYDROCHLORIDE 750 MG/1
1500 TABLET, EXTENDED RELEASE ORAL
COMMUNITY
End: 2019-07-30

## 2019-07-30 RX ORDER — DIETHYLPROPION HYDROCHLORIDE 75 MG/1
1 TABLET ORAL EVERY MORNING
Qty: 30 TABLET | Refills: 2 | Status: SHIPPED | OUTPATIENT
Start: 2019-07-30 | End: 2020-01-07 | Stop reason: ALTCHOICE

## 2019-07-30 RX ORDER — METFORMIN HYDROCHLORIDE 750 MG/1
1500 TABLET, EXTENDED RELEASE ORAL
Qty: 180 TABLET | Refills: 1 | Status: SHIPPED | OUTPATIENT
Start: 2019-07-30 | End: 2020-01-07

## 2019-07-30 NOTE — PATIENT INSTRUCTIONS
Continue making lifestyle changes that focus on good nutrition, regular exercise and stress management. Medication Plan: Continue current medication regimen.     Motivational Tips to Overcome the [address] with Weight-loss  April 17, 2019 • Posted i 180. Take your workouts somewhere else or try a new exercise altogether. Pick a new cuisine to try out and use it to meal prep your lunches. Put your scale away for a week and focus on your lifestyle, not the number. Remember that you're not a machine.  Wi

## 2019-07-30 NOTE — PROGRESS NOTES
Carl Castañeda is a 34year old female presents today for follow-up on medical weight loss program for the treatment of overweight, obesity, or morbid obesity associated elevated leptin. Patient has lost -4# since LOV 2 month ago.  Compliant with medicat AND PLAN:  Encounter for therapeutic drug monitoring  (primary encounter diagnosis)  Obesity (bmi 30.0-34.9)    No orders of the defined types were placed in this encounter.       Meds & Refills for this Visit:  Requested Prescriptions     Signed Prescripti that will instill in you a fighting spirit. Perhaps you could start training for a 5K marathon race, try four new healthy recipes next month, or resolve to master a fun new fitness skill.   Take a Break from the [place]  Strict and rigorous routines ca

## 2019-08-20 ENCOUNTER — HOSPITAL ENCOUNTER (OUTPATIENT)
Dept: MAMMOGRAPHY | Age: 39
Discharge: HOME | End: 2019-08-20
Attending: OBSTETRICS & GYNECOLOGY
Payer: COMMERCIAL

## 2019-08-20 DIAGNOSIS — Z12.31 VISIT FOR SCREENING MAMMOGRAM: ICD-10-CM

## 2019-08-20 PROBLEM — D06.9 CIN III (CERVICAL INTRAEPITHELIAL NEOPLASIA GRADE III) WITH SEVERE DYSPLASIA: Status: ACTIVE | Noted: 2019-08-20

## 2019-08-20 PROBLEM — E28.39 PREMATURE OVARIAN FAILURE: Status: ACTIVE | Noted: 2019-08-20

## 2019-08-20 PROCEDURE — 87624 HPV HI-RISK TYP POOLED RSLT: CPT | Performed by: OBSTETRICS & GYNECOLOGY

## 2019-08-20 PROCEDURE — 77067 SCR MAMMO BI INCL CAD: CPT | Performed by: OBSTETRICS & GYNECOLOGY

## 2019-08-20 PROCEDURE — 77063 BREAST TOMOSYNTHESIS BI: CPT | Performed by: OBSTETRICS & GYNECOLOGY

## 2019-08-20 PROCEDURE — 88175 CYTOPATH C/V AUTO FLUID REDO: CPT | Performed by: OBSTETRICS & GYNECOLOGY

## 2019-10-24 RX ORDER — MONTELUKAST SODIUM 10 MG/1
TABLET ORAL
Qty: 90 TABLET | Refills: 0 | Status: SHIPPED | OUTPATIENT
Start: 2019-10-24 | End: 2020-02-02

## 2019-10-24 NOTE — TELEPHONE ENCOUNTER
Last VISIT 01/16/19  Last REFILL 10/24/2018 qty 90 w/3 refills  Last LABS No recent labs done    Per PROTOCOL? Failed    Please Approve or Deny.

## 2019-11-24 DIAGNOSIS — Z51.81 ENCOUNTER FOR THERAPEUTIC DRUG MONITORING: ICD-10-CM

## 2019-11-24 DIAGNOSIS — E66.9 OBESITY (BMI 30.0-34.9): ICD-10-CM

## 2019-11-25 RX ORDER — BUPROPION HYDROCHLORIDE 100 MG/1
TABLET ORAL
Qty: 120 TABLET | Refills: 0 | Status: SHIPPED | OUTPATIENT
Start: 2019-11-25 | End: 2020-01-07

## 2019-11-25 NOTE — TELEPHONE ENCOUNTER
Requesting Bupropion  LOV: 7/30/19  RTC: 3 months  Last Relevant Labs: na  Filled: 7/29/19 #360 with 0 refills    Future Appointments   Date Time Provider Beny Mccray   12/4/2019 11:40 AM LORETTA Bean [facility]     Pt cancelled he

## 2019-12-02 ENCOUNTER — TELEPHONE (OUTPATIENT)
Dept: INTERNAL MEDICINE CLINIC | Facility: CLINIC | Age: 39
End: 2019-12-02

## 2019-12-02 DIAGNOSIS — Z13.0 SCREENING FOR BLOOD DISEASE: ICD-10-CM

## 2019-12-02 DIAGNOSIS — Z00.00 ROUTINE GENERAL MEDICAL EXAMINATION AT A HEALTH CARE FACILITY: Primary | ICD-10-CM

## 2019-12-02 DIAGNOSIS — Z13.220 SCREENING FOR LIPID DISORDERS: ICD-10-CM

## 2019-12-02 DIAGNOSIS — Z13.29 SCREENING FOR THYROID DISORDER: ICD-10-CM

## 2019-12-02 DIAGNOSIS — Z13.228 SCREENING FOR METABOLIC DISORDER: ICD-10-CM

## 2019-12-02 NOTE — TELEPHONE ENCOUNTER
Future Appointments   Date Time Provider Beny Mccray   1/7/2020  9:20 AM LORETTA Larkin [facility] [address] [facility]   1/7/2020 10:20 AM LORETTA Dee [facility]     Orders to edward- Pt aware to fast-no call back required

## 2020-01-05 DIAGNOSIS — Z51.81 ENCOUNTER FOR THERAPEUTIC DRUG MONITORING: ICD-10-CM

## 2020-01-05 DIAGNOSIS — E66.9 OBESITY (BMI 30.0-34.9): ICD-10-CM

## 2020-01-06 RX ORDER — METFORMIN HYDROCHLORIDE 750 MG/1
1500 TABLET, EXTENDED RELEASE ORAL
Qty: 180 TABLET | Refills: 1 | Status: CANCELLED | OUTPATIENT
Start: 2020-01-06

## 2020-01-06 RX ORDER — DIETHYLPROPION HYDROCHLORIDE 75 MG/1
1 TABLET ORAL EVERY MORNING
Qty: 30 TABLET | Refills: 2 | Status: CANCELLED | OUTPATIENT
Start: 2020-01-06

## 2020-01-07 ENCOUNTER — LAB ENCOUNTER (OUTPATIENT)
Dept: LAB | Age: 40
End: 2020-01-07
Attending: NURSE PRACTITIONER
Payer: COMMERCIAL

## 2020-01-07 ENCOUNTER — OFFICE VISIT (OUTPATIENT)
Dept: INTERNAL MEDICINE CLINIC | Facility: CLINIC | Age: 40
End: 2020-01-07

## 2020-01-07 VITALS
SYSTOLIC BLOOD PRESSURE: 120 MMHG | BODY MASS INDEX: 27.66 KG/M2 | DIASTOLIC BLOOD PRESSURE: 80 MMHG | HEART RATE: 80 BPM | HEIGHT: 64 IN | WEIGHT: 162 LBS | RESPIRATION RATE: 14 BRPM

## 2020-01-07 DIAGNOSIS — Z13.0 SCREENING FOR BLOOD DISEASE: ICD-10-CM

## 2020-01-07 DIAGNOSIS — E53.8 VITAMIN B12 DEFICIENCY: ICD-10-CM

## 2020-01-07 DIAGNOSIS — F41.9 ANXIETY: ICD-10-CM

## 2020-01-07 DIAGNOSIS — E66.9 OBESITY (BMI 30.0-34.9): ICD-10-CM

## 2020-01-07 DIAGNOSIS — Z51.81 ENCOUNTER FOR THERAPEUTIC DRUG MONITORING: Primary | ICD-10-CM

## 2020-01-07 DIAGNOSIS — Z13.220 SCREENING FOR LIPID DISORDERS: ICD-10-CM

## 2020-01-07 DIAGNOSIS — Z51.81 ENCOUNTER FOR THERAPEUTIC DRUG MONITORING: ICD-10-CM

## 2020-01-07 DIAGNOSIS — Z00.00 ROUTINE GENERAL MEDICAL EXAMINATION AT A HEALTH CARE FACILITY: ICD-10-CM

## 2020-01-07 DIAGNOSIS — Z13.29 SCREENING FOR THYROID DISORDER: ICD-10-CM

## 2020-01-07 DIAGNOSIS — Z13.228 SCREENING FOR METABOLIC DISORDER: ICD-10-CM

## 2020-01-07 LAB
ALBUMIN SERPL-MCNC: 3.7 G/DL (ref 3.4–5)
ALBUMIN/GLOB SERPL: 0.9 {RATIO} (ref 1–2)
ALP LIVER SERPL-CCNC: 58 U/L (ref 37–98)
ALT SERPL-CCNC: 25 U/L (ref 13–56)
ANION GAP SERPL CALC-SCNC: 6 MMOL/L (ref 0–18)
AST SERPL-CCNC: 16 U/L (ref 15–37)
BASOPHILS # BLD AUTO: 0.05 X10(3) UL (ref 0–0.2)
BASOPHILS NFR BLD AUTO: 0.9 %
BILIRUB SERPL-MCNC: 0.5 MG/DL (ref 0.1–2)
BUN BLD-MCNC: 12 MG/DL (ref 7–18)
BUN/CREAT SERPL: 10.3 (ref 10–20)
CALCIUM BLD-MCNC: 9.1 MG/DL (ref 8.5–10.1)
CHLORIDE SERPL-SCNC: 105 MMOL/L (ref 98–112)
CHOLEST SMN-MCNC: 196 MG/DL (ref ?–200)
CO2 SERPL-SCNC: 28 MMOL/L (ref 21–32)
CREAT BLD-MCNC: 1.16 MG/DL (ref 0.55–1.02)
DEPRECATED RDW RBC AUTO: 43.6 FL (ref 35.1–46.3)
EOSINOPHIL # BLD AUTO: 0.22 X10(3) UL (ref 0–0.7)
EOSINOPHIL NFR BLD AUTO: 3.8 %
ERYTHROCYTE [DISTWIDTH] IN BLOOD BY AUTOMATED COUNT: 13.1 % (ref 11–15)
GLOBULIN PLAS-MCNC: 3.9 G/DL (ref 2.8–4.4)
GLUCOSE BLD-MCNC: 79 MG/DL (ref 70–99)
HAV IGM SER QL: 2.2 MG/DL (ref 1.6–2.6)
HCT VFR BLD AUTO: 40.8 % (ref 35–48)
HDLC SERPL-MCNC: 62 MG/DL (ref 40–59)
HGB BLD-MCNC: 13.3 G/DL (ref 12–16)
IMM GRANULOCYTES # BLD AUTO: 0.02 X10(3) UL (ref 0–1)
IMM GRANULOCYTES NFR BLD: 0.3 %
LDLC SERPL CALC-MCNC: 119 MG/DL (ref ?–100)
LYMPHOCYTES # BLD AUTO: 1.7 X10(3) UL (ref 1–4)
LYMPHOCYTES NFR BLD AUTO: 29.2 %
M PROTEIN MFR SERPL ELPH: 7.6 G/DL (ref 6.4–8.2)
MCH RBC QN AUTO: 29.6 PG (ref 26–34)
MCHC RBC AUTO-ENTMCNC: 32.6 G/DL (ref 31–37)
MCV RBC AUTO: 90.9 FL (ref 80–100)
MONOCYTES # BLD AUTO: 0.54 X10(3) UL (ref 0.1–1)
MONOCYTES NFR BLD AUTO: 9.3 %
NEUTROPHILS # BLD AUTO: 3.29 X10 (3) UL (ref 1.5–7.7)
NEUTROPHILS # BLD AUTO: 3.29 X10(3) UL (ref 1.5–7.7)
NEUTROPHILS NFR BLD AUTO: 56.5 %
NONHDLC SERPL-MCNC: 134 MG/DL (ref ?–130)
OSMOLALITY SERPL CALC.SUM OF ELEC: 287 MOSM/KG (ref 275–295)
PATIENT FASTING Y/N/NP: YES
PATIENT FASTING Y/N/NP: YES
PLATELET # BLD AUTO: 317 10(3)UL (ref 150–450)
POTASSIUM SERPL-SCNC: 4.4 MMOL/L (ref 3.5–5.1)
RBC # BLD AUTO: 4.49 X10(6)UL (ref 3.8–5.3)
SODIUM SERPL-SCNC: 139 MMOL/L (ref 136–145)
T4 FREE SERPL-MCNC: 1 NG/DL (ref 0.8–1.7)
TRIGL SERPL-MCNC: 74 MG/DL (ref 30–149)
TSI SER-ACNC: 1.75 MIU/ML (ref 0.36–3.74)
VIT B12 SERPL-MCNC: 1371 PG/ML (ref 193–986)
VLDLC SERPL CALC-MCNC: 15 MG/DL (ref 0–30)
WBC # BLD AUTO: 5.8 X10(3) UL (ref 4–11)

## 2020-01-07 PROCEDURE — 84439 ASSAY OF FREE THYROXINE: CPT

## 2020-01-07 PROCEDURE — 85025 COMPLETE CBC W/AUTO DIFF WBC: CPT

## 2020-01-07 PROCEDURE — 83735 ASSAY OF MAGNESIUM: CPT

## 2020-01-07 PROCEDURE — 80061 LIPID PANEL: CPT

## 2020-01-07 PROCEDURE — 99214 OFFICE O/P EST MOD 30 MIN: CPT | Performed by: NURSE PRACTITIONER

## 2020-01-07 PROCEDURE — 80053 COMPREHEN METABOLIC PANEL: CPT

## 2020-01-07 PROCEDURE — 84443 ASSAY THYROID STIM HORMONE: CPT

## 2020-01-07 PROCEDURE — 82607 VITAMIN B-12: CPT

## 2020-01-07 RX ORDER — LEVONORGESTREL AND ETHINYL ESTRADIOL 100-20(84)
1 KIT ORAL DAILY
COMMUNITY
End: 2020-07-01

## 2020-01-07 RX ORDER — BUPROPION HYDROCHLORIDE 100 MG/1
TABLET ORAL
Qty: 120 TABLET | Refills: 2 | Status: SHIPPED | OUTPATIENT
Start: 2020-01-07 | End: 2020-01-07

## 2020-01-07 RX ORDER — FLUOXETINE HYDROCHLORIDE 20 MG/1
20 CAPSULE ORAL DAILY
Qty: 30 CAPSULE | Refills: 2 | Status: SHIPPED | OUTPATIENT
Start: 2020-01-07 | End: 2020-03-31

## 2020-01-07 RX ORDER — ASCORBIC ACID 125 MG
TABLET,CHEWABLE ORAL
COMMUNITY
End: 2020-10-13

## 2020-01-07 NOTE — PROGRESS NOTES
Luis Puentes is a 37year old female presents today for follow-up on medical weight loss program for the treatment of overweight, obesity, or morbid obesity associated elevated leptin. Patient has lost -1# since LOV 5 month ago.  Compliant with medicat change in behavior or mood, denies feeling sad or depressed    EXAM:  /80   Pulse 80   Resp 14   Ht 64\"   Wt 162 lb (73.5 kg)   BMI 27.81 kg/m²    GENERAL: well developed, well nourished, in no apparent distress, overweight  EYES: conjunctiva pink, exercise and stress management. Medication Plan: Continue current medication regimen, aside from remain off Metformin ER. Stop diethylpropion ER- may be contributing to tremor, although other possibilities including caffeine intake.  Consider switch to C

## 2020-01-07 NOTE — PATIENT INSTRUCTIONS
Continue making lifestyle changes that focus on good nutrition, regular exercise and stress management. Medication Plan: Continue current medication regimen, aside from remain off Metformin ER.  Stop diethylpropion ER- may be contributing to tremor, alth

## 2020-01-11 RX ORDER — BUPROPION HYDROCHLORIDE 100 MG/1
TABLET ORAL
Qty: 120 TABLET | Refills: 12 | OUTPATIENT
Start: 2020-01-11

## 2020-02-04 ENCOUNTER — OFFICE VISIT (OUTPATIENT)
Dept: INTERNAL MEDICINE CLINIC | Facility: CLINIC | Age: 40
End: 2020-02-04

## 2020-02-04 VITALS
DIASTOLIC BLOOD PRESSURE: 80 MMHG | HEART RATE: 80 BPM | WEIGHT: 166.63 LBS | BODY MASS INDEX: 28.45 KG/M2 | HEIGHT: 64 IN | RESPIRATION RATE: 16 BRPM | SYSTOLIC BLOOD PRESSURE: 104 MMHG | TEMPERATURE: 98 F

## 2020-02-04 DIAGNOSIS — G43.809 OTHER MIGRAINE WITHOUT STATUS MIGRAINOSUS, NOT INTRACTABLE: ICD-10-CM

## 2020-02-04 DIAGNOSIS — Z00.00 ROUTINE GENERAL MEDICAL EXAMINATION AT A HEALTH CARE FACILITY: Primary | ICD-10-CM

## 2020-02-04 DIAGNOSIS — F41.1 ANXIETY STATE: ICD-10-CM

## 2020-02-04 DIAGNOSIS — E53.8 B12 DEFICIENCY: ICD-10-CM

## 2020-02-04 DIAGNOSIS — J45.20 MILD INTERMITTENT ASTHMA WITHOUT COMPLICATION: ICD-10-CM

## 2020-02-04 PROCEDURE — 99396 PREV VISIT EST AGE 40-64: CPT | Performed by: NURSE PRACTITIONER

## 2020-02-04 RX ORDER — MONTELUKAST SODIUM 10 MG/1
TABLET ORAL
Qty: 90 TABLET | Refills: 3 | Status: SHIPPED | OUTPATIENT
Start: 2020-02-04 | End: 2021-01-22

## 2020-02-04 NOTE — PROGRESS NOTES
Raymundo Messina is a 37year old female who presents for a complete physical exam:       Patient complains of:    Asthma:  Score 25.   singulair  Reviewed and agree with plan as discussed. Fluoxetine and Wellbutrin. Ordered by Earnestine Quiñones.       Mi • SUMATRIPTAN SUCCINATE 50 MG Oral Tab TAKE 1 TABLET BY MOUTH AT ONSET OF HEADACHE. MAY REPEAT IN 2 HOURS AS NEEDED. MAXIMUM OF 2 TABLETS IN 24 HOURS 9 tablet 3   • Vitamin D3 2000 UNITS Oral Cap Take 4,000 Units by mouth daily.      • Loratadine (CLARITI Prevention was discussed. Reviewed Calcium, Vitamin D supplementation and Weight Bearing Exercises. Patient is performing weight bearing exercises. Patient is currently taking calcium and Vitamin D supplementation.         REVIEW OF SYSTEMS:   GENERAL: this Visit:  Requested Prescriptions      No prescriptions requested or ordered in this encounter       Imaging & Consults:  None    No follow-ups on file. There are no Patient Instructions on file for this visit.

## 2020-02-04 NOTE — TELEPHONE ENCOUNTER
Last VISIT 01/16/19    Last REFILL 10/24/19 qty 90 w/0 refills    Last LABS 01/07/20 Multiple labs done    Future Appointments   Date Time Provider Beny Mccray   2/4/2020  1:40 PM LORETTA Garcia [facility] [address] [facility]         Per PROTOCOL? Failed    Please Approve or Deny.

## 2020-03-26 ENCOUNTER — PATIENT MESSAGE (OUTPATIENT)
Dept: INTERNAL MEDICINE CLINIC | Facility: CLINIC | Age: 40
End: 2020-03-26

## 2020-03-26 NOTE — TELEPHONE ENCOUNTER
Patient was last seen 1/7/20 and to return on 2 months  She is on fluoxetine and bupropion per chart    Would you like a tele visit?

## 2020-03-26 NOTE — TELEPHONE ENCOUNTER
From: Aris Melgoza  To: LORETTA Pop  Sent: 3/26/2020 2:18 PM CDT  Subject: Prescription Question    Hello,    I had cancelled my appt earlier this week due to all of the recent events.  Unfortunately being at home all day with kids and snacks an

## 2020-03-31 ENCOUNTER — VIRTUAL PHONE E/M (OUTPATIENT)
Dept: INTERNAL MEDICINE CLINIC | Facility: CLINIC | Age: 40
End: 2020-03-31

## 2020-03-31 DIAGNOSIS — E66.9 OBESITY (BMI 30.0-34.9): ICD-10-CM

## 2020-03-31 DIAGNOSIS — F41.9 ANXIETY: ICD-10-CM

## 2020-03-31 DIAGNOSIS — Z51.81 ENCOUNTER FOR THERAPEUTIC DRUG MONITORING: Primary | ICD-10-CM

## 2020-03-31 DIAGNOSIS — F43.9 STRESS: ICD-10-CM

## 2020-03-31 PROCEDURE — 99213 OFFICE O/P EST LOW 20 MIN: CPT | Performed by: NURSE PRACTITIONER

## 2020-03-31 RX ORDER — BUPROPION HYDROCHLORIDE 100 MG/1
TABLET ORAL
Qty: 360 TABLET | Refills: 2 | Status: SHIPPED | OUTPATIENT
Start: 2020-03-31 | End: 2020-07-16

## 2020-03-31 RX ORDER — FLUOXETINE HYDROCHLORIDE 40 MG/1
40 CAPSULE ORAL DAILY
Qty: 60 CAPSULE | Refills: 2 | Status: SHIPPED | OUTPATIENT
Start: 2020-03-31 | End: 2020-07-15

## 2020-03-31 RX ORDER — DIETHYLPROPION HYDROCHLORIDE 75 MG/1
1 TABLET ORAL EVERY MORNING
Qty: 30 TABLET | Refills: 1 | Status: SHIPPED | OUTPATIENT
Start: 2020-03-31 | End: 2020-07-15

## 2020-03-31 NOTE — PROGRESS NOTES
Virtual/Telephone Check-In    Dorothea Harris verbally consents to a Virtual/Telephone Check-In service on 03/31/20. Patient understands and accepts financial responsibility for any deductible, co-insurance and/or co-pays associated with this service.     D MG Oral Tab; TAKE 2 TABLETS TWICE A DAY  -     Discontinue: Diethylpropion HCl ER 75 MG Oral Tablet 24 Hr; Take 1 tablet (75 mg total) by mouth every morning. Anxiety  -     Discontinue: FLUoxetine HCl 40 MG Oral Cap;  Take 1 capsule (40 mg total) by manuel

## 2020-03-31 NOTE — PATIENT INSTRUCTIONS
Thank you for taking the time for our virtual encounter today! Here are the next steps and plans we discussed:    1.  Continue your medications at current dose aside from increase Fluoxetine to 40 mg daily and restart diethylpropion ER daily in the AM- new

## 2020-06-30 PROCEDURE — 87624 HPV HI-RISK TYP POOLED RSLT: CPT | Performed by: OBSTETRICS & GYNECOLOGY

## 2020-06-30 PROCEDURE — 88175 CYTOPATH C/V AUTO FLUID REDO: CPT | Performed by: OBSTETRICS & GYNECOLOGY

## 2020-07-14 NOTE — PROGRESS NOTES
Sona Dang is a 37year old female presents today for follow-up on medical weight loss program for the treatment of overweight, obesity, or morbid obesity associated elevated leptin.     Patient has lost -13# since LOV 6 month ago in clinic with TE 4 mo GI: +BS  NEURO/MS: motor and sensory grossly intact  PSYCH: pleasant, cooperative, normal mood and affect    ASSESSMENT AND PLAN:  Encounter for therapeutic drug monitoring  (primary encounter diagnosis)  Obesity (bmi 30.0-34. 9)  Anxiety  Stress  Weight but also to provide basic fuel for our bodies. Learning to see food as fuel can help you find balance in your journey with weight.  It will teach you about the primal purpose of food, the effect certain foods have on health, and how to listen carefully to Fruits, dark green veggies, sweet potatoes, tea, etc.  So, the next time you grab something to eat, ask yourself how that food helps or hurts your body. This is a part of living mindful and being knowledgeable about what you consume regularly.   When you st brain)  • Trouble focusing  Foods that [address] [place]  Regularly eating whole, nutritious foods can do your brain a lot of good.  If you struggle with mental health disorders or need help concentrating on work or school, you'll probably notice a huge di factors can also affect your brain. The bottom line, though, is that your diet should never leave you feeling hungry, weak, unfocused or deprived. Moderation is always key! Medication use and SEs reviewed with patient.     Return in about 1 month (clif

## 2020-07-15 ENCOUNTER — OFFICE VISIT (OUTPATIENT)
Dept: INTERNAL MEDICINE CLINIC | Facility: CLINIC | Age: 40
End: 2020-07-15

## 2020-07-15 VITALS
SYSTOLIC BLOOD PRESSURE: 110 MMHG | RESPIRATION RATE: 16 BRPM | WEIGHT: 175 LBS | TEMPERATURE: 98 F | HEIGHT: 64 IN | HEART RATE: 76 BPM | DIASTOLIC BLOOD PRESSURE: 78 MMHG | BODY MASS INDEX: 29.88 KG/M2

## 2020-07-15 DIAGNOSIS — E66.9 OBESITY (BMI 30.0-34.9): ICD-10-CM

## 2020-07-15 DIAGNOSIS — Z51.81 ENCOUNTER FOR THERAPEUTIC DRUG MONITORING: Primary | ICD-10-CM

## 2020-07-15 DIAGNOSIS — R63.5 WEIGHT GAIN: ICD-10-CM

## 2020-07-15 DIAGNOSIS — F43.9 STRESS: ICD-10-CM

## 2020-07-15 DIAGNOSIS — F41.9 ANXIETY: ICD-10-CM

## 2020-07-15 PROCEDURE — 3074F SYST BP LT 130 MM HG: CPT | Performed by: NURSE PRACTITIONER

## 2020-07-15 PROCEDURE — 3078F DIAST BP <80 MM HG: CPT | Performed by: NURSE PRACTITIONER

## 2020-07-15 PROCEDURE — 3008F BODY MASS INDEX DOCD: CPT | Performed by: NURSE PRACTITIONER

## 2020-07-15 PROCEDURE — 99214 OFFICE O/P EST MOD 30 MIN: CPT | Performed by: NURSE PRACTITIONER

## 2020-07-15 RX ORDER — FLUOXETINE HYDROCHLORIDE 40 MG/1
40 CAPSULE ORAL DAILY
Qty: 90 CAPSULE | Refills: 1 | Status: SHIPPED | OUTPATIENT
Start: 2020-07-15 | End: 2021-01-21

## 2020-07-15 RX ORDER — DIETHYLPROPION HYDROCHLORIDE 75 MG/1
1 TABLET ORAL EVERY MORNING
Qty: 30 TABLET | Refills: 0 | Status: SHIPPED | OUTPATIENT
Start: 2020-07-15 | End: 2020-08-26

## 2020-07-15 NOTE — PATIENT INSTRUCTIONS
Continue making lifestyle changes that focus on good nutrition, regular exercise and stress management. Medication Plan: Continue current medication regimen.     Recommend books for support and encouragement: Prefer audio book for Can't Hurt Me by Aristides Castro making the healthy choice the easy choice. Food will be less about rewards or punishments and more about supporting your overall health and happiness.   Building Blocks of Good Nutrition  A diet without enough fiber, protein and healthy fats can cause your proteins, electrolytes and more. All of these nutrients have a role in the human body, including brain activity.  They affect processes such as:  • Memory  • Energy production  • Blood sugar levels  • Mood  • Concentration and clarity  You wouldn't want to health. Nuts and Seeds  Just like fish, nuts and seeds have a lot of Omega 3 fatty acids. They also have antioxidant properties that improve cognition and boost overall brain health. Some great examples are sunflower seeds, almonds, hazelnuts and peanuts.

## 2020-07-16 DIAGNOSIS — E66.9 OBESITY (BMI 30.0-34.9): ICD-10-CM

## 2020-07-16 DIAGNOSIS — Z51.81 ENCOUNTER FOR THERAPEUTIC DRUG MONITORING: ICD-10-CM

## 2020-07-16 DIAGNOSIS — F41.9 ANXIETY: ICD-10-CM

## 2020-07-16 RX ORDER — BUPROPION HYDROCHLORIDE 100 MG/1
TABLET ORAL
Qty: 360 TABLET | Refills: 0 | Status: SHIPPED | OUTPATIENT
Start: 2020-07-16 | End: 2020-12-03

## 2020-07-16 NOTE — TELEPHONE ENCOUNTER
Requesting Bupropion 100 mg  LOV: 7/15/20  RTC: one month  Last Relevant Labs: na  Filled: 3/31/20 #360 with 3 refills    Future Appointments   Date Time Provider Beny Mccray   9/16/2020 12:40 PM [facility] [place]     9 month RX was sent t

## 2020-08-26 DIAGNOSIS — Z51.81 ENCOUNTER FOR THERAPEUTIC DRUG MONITORING: ICD-10-CM

## 2020-08-26 DIAGNOSIS — E66.9 OBESITY (BMI 30.0-34.9): ICD-10-CM

## 2020-08-26 RX ORDER — DIETHYLPROPION HYDROCHLORIDE 75 MG/1
TABLET ORAL
Qty: 30 TABLET | Refills: 0 | Status: SHIPPED | OUTPATIENT
Start: 2020-08-26 | End: 2020-10-13 | Stop reason: ALTCHOICE

## 2020-08-26 NOTE — TELEPHONE ENCOUNTER
Requesting Diethylpropion ER 75 mg  LOV: 7/15/20  RTC: one month  Last Relevant Labs: na  Filled: 7/15/20 #30 with 0 refills  [place]    Future Appointments   Date Time Provider Beny Mccray   9/3/2020  9:00 AM [place]

## 2020-09-04 ENCOUNTER — TELEPHONE (OUTPATIENT)
Dept: INTERNAL MEDICINE CLINIC | Facility: CLINIC | Age: 40
End: 2020-09-04

## 2020-09-09 NOTE — TELEPHONE ENCOUNTER
Approval received for diethylpropion    Outcome  Approvedon September 4  CaseId:[id number];Status:Approved; Review Type:Prior Auth; Coverage Start Date:08/05/2020; Coverage End Date:09/04/2021;  Drug  Diethylpropion HCl ER 75MG er tablets

## 2020-10-12 NOTE — PROGRESS NOTES
Dilcia Barrera is a 41year old female presents today for follow-up on medical weight loss program for the treatment of overweight, obesity, or morbid obesity associated elevated leptin. Patient has lost -9# since LOV 3 month ago.  She has been compliant in all fields, breathing non labored  CARDIO: RRR without murmur, normal S1 and S2 without clicks or gallops, no pedal edema.    GI: +BS  NEURO/MS: motor and sensory grossly intact  PSYCH: pleasant, cooperative, normal mood and affect    ASSESSMENT AND PLAN to Courage. Adding meditation to your day, even just 10 minutes can help as well. Weight Management: Take It Off and Keep It Off  It’s easy to be motivated when you first start.  The key is to stay motivated all along the way and to have realistic and ac and that you like about yourself. Add something new from time to time. Keep this list to look at when you need a lift. Resources  · The [employer] on Fitness, Sports & Nutritionwww. fitness. gov  · Academy of Nutrition and Dieteticswww. eatright. org

## 2020-10-13 ENCOUNTER — OFFICE VISIT (OUTPATIENT)
Dept: INTERNAL MEDICINE CLINIC | Facility: CLINIC | Age: 40
End: 2020-10-13

## 2020-10-13 VITALS
WEIGHT: 184 LBS | HEIGHT: 64 IN | DIASTOLIC BLOOD PRESSURE: 82 MMHG | RESPIRATION RATE: 16 BRPM | TEMPERATURE: 97 F | SYSTOLIC BLOOD PRESSURE: 114 MMHG | HEART RATE: 88 BPM | BODY MASS INDEX: 31.41 KG/M2

## 2020-10-13 DIAGNOSIS — E66.9 OBESITY (BMI 30.0-34.9): ICD-10-CM

## 2020-10-13 DIAGNOSIS — Z51.81 ENCOUNTER FOR THERAPEUTIC DRUG MONITORING: Primary | ICD-10-CM

## 2020-10-13 DIAGNOSIS — F43.9 STRESS: ICD-10-CM

## 2020-10-13 DIAGNOSIS — R63.2 BINGE EATING: ICD-10-CM

## 2020-10-13 DIAGNOSIS — R63.5 WEIGHT GAIN: ICD-10-CM

## 2020-10-13 PROCEDURE — 3079F DIAST BP 80-89 MM HG: CPT | Performed by: NURSE PRACTITIONER

## 2020-10-13 PROCEDURE — 3074F SYST BP LT 130 MM HG: CPT | Performed by: NURSE PRACTITIONER

## 2020-10-13 PROCEDURE — 3008F BODY MASS INDEX DOCD: CPT | Performed by: NURSE PRACTITIONER

## 2020-10-13 PROCEDURE — 99214 OFFICE O/P EST MOD 30 MIN: CPT | Performed by: NURSE PRACTITIONER

## 2020-10-13 RX ORDER — DIETHYLPROPION HYDROCHLORIDE 75 MG/1
1 TABLET ORAL EVERY MORNING
Qty: 30 TABLET | Refills: 0 | Status: CANCELLED | OUTPATIENT
Start: 2020-10-13

## 2020-10-13 NOTE — PATIENT INSTRUCTIONS
Continue making lifestyle changes that focus on good nutrition, regular exercise and stress management. Medication Plan: Continue current medication regimen, aside from stop diethylpropion and start Vyvnase daily.     Recommend books for support and enco important as what you do. If you don’t think you can succeed, chances are you won’t. Believe that you can stick to your plan and meet your goals:  · If you don’t meet a goal, don’t use it as an excuse to give up on your whole plan.  Adjust your goal and try

## 2020-11-17 DIAGNOSIS — R63.2 BINGE EATING: ICD-10-CM

## 2020-11-17 DIAGNOSIS — Z51.81 ENCOUNTER FOR THERAPEUTIC DRUG MONITORING: ICD-10-CM

## 2020-11-17 RX ORDER — LISDEXAMFETAMINE DIMESYLATE CAPSULES 30 MG/1
30 CAPSULE ORAL DAILY
Qty: 30 CAPSULE | Refills: 0 | Status: CANCELLED | OUTPATIENT
Start: 2020-11-17 | End: 2020-12-17

## 2020-11-17 NOTE — TELEPHONE ENCOUNTER
Requesting Vyvanse 30 mg  LOV: 10/13/20  RTC: 2 months  Last Relevant Labs: na  Filled: 11/13/20 #30 with 0 refills    No future appointments.

## 2020-12-02 DIAGNOSIS — E66.9 OBESITY (BMI 30.0-34.9): ICD-10-CM

## 2020-12-02 DIAGNOSIS — F41.9 ANXIETY: ICD-10-CM

## 2020-12-02 DIAGNOSIS — Z51.81 ENCOUNTER FOR THERAPEUTIC DRUG MONITORING: ICD-10-CM

## 2020-12-03 RX ORDER — BUPROPION HYDROCHLORIDE 100 MG/1
TABLET ORAL
Qty: 360 TABLET | Refills: 1 | Status: SHIPPED | OUTPATIENT
Start: 2020-12-03 | End: 2021-08-16

## 2021-01-20 ENCOUNTER — PATIENT MESSAGE (OUTPATIENT)
Dept: INTERNAL MEDICINE CLINIC | Facility: CLINIC | Age: 41
End: 2021-01-20

## 2021-01-20 DIAGNOSIS — F41.9 ANXIETY: ICD-10-CM

## 2021-01-20 DIAGNOSIS — Z51.81 ENCOUNTER FOR THERAPEUTIC DRUG MONITORING: ICD-10-CM

## 2021-01-20 DIAGNOSIS — E66.9 OBESITY (BMI 30.0-34.9): ICD-10-CM

## 2021-01-20 DIAGNOSIS — F43.9 STRESS: ICD-10-CM

## 2021-01-21 RX ORDER — FLUOXETINE HYDROCHLORIDE 40 MG/1
CAPSULE ORAL
Qty: 90 CAPSULE | Refills: 0 | Status: SHIPPED | OUTPATIENT
Start: 2021-01-21 | End: 2021-04-28

## 2021-01-21 NOTE — TELEPHONE ENCOUNTER
From: Sona Dang  To: LORETTA Montague  Sent: 1/20/2021 2:22 PM CST  Subject: Prescription Question    Hello,    Can I please get my Vyvanse refilled? I made an appointment to be seen. I know I'm a little overdue.      Thanks,  Leslie Lubin

## 2021-01-21 NOTE — TELEPHONE ENCOUNTER
Requesting Vyvanse  LOV: 10/13/20  RTC: 2 months  Last Relevant Labs: na  Filled: 11/13/20 #30 with 0 refills   [place]    Future Appointments   Date Time Provider Beny Mccray   2/9/2021  1:40 PM Nathalie Seaman

## 2021-01-21 NOTE — TELEPHONE ENCOUNTER
Requesting fluoxetine 40 mg  LOV: 10/13/20  RTC: 2 months  Last Relevant Labs: na  Filled: 7/15/20 #90 with 1 refills    Future Appointments   Date Time Provider Beny Mccray   2/9/2021  1:40 PM LORETTA Ferguson EM[facility]     pended

## 2021-01-22 RX ORDER — MONTELUKAST SODIUM 10 MG/1
TABLET ORAL
Qty: 90 TABLET | Refills: 0 | Status: SHIPPED | OUTPATIENT
Start: 2021-01-22 | End: 2021-03-11

## 2021-01-22 NOTE — TELEPHONE ENCOUNTER
Last Ov: 2/4/20, SD, CPE  Last labs: CBC, CMP, Lipid, Magnesium, TSH+Free T4, Vit B12  1/7/20  Last Rx: montelukast 10mg, #90, 3R 2/4/20    Future Appointments   Date Time Provider Beny Mccray   2/9/2021  1:40 PM LORETTA Paz EMG [facility]

## 2021-02-08 NOTE — PROGRESS NOTES
Simeon Muse is a 41year old female presents today for follow-up on medical weight loss program for the treatment of overweight, obesity, or morbid obesity associated elevated leptin. Patient has lost 2# since LOV 4 month ago.  She has been compliant conjunctiva pink, sclera non icteric  LUNGS: CTA in all fields, breathing non labored  CARDIO: RRR without murmur, normal S1 and S2 without clicks or gallops, no pedal edema.    GI: +BS  NEURO/MS: motor and sensory grossly intact  PSYCH: pleasant, cooperati of the 4 Pillars of Health (Sleep, stress, fitness and nutrition). Actively be concious and aware of your choices. Listen to the Calm Masterclass lesson on The 4 Pillars of Health found on the CALM nicole under more.     6 Important Wellness Tips to Adopt for perform their best. When cleaning up your diet, meal prepping and cooking new dishes, keep in mind all the ways you are benefiting your total health.   4 – Cut Screen Time  A digital detox is good for mental and emotional health as well as your physical hea aren't helping you meet your health goals. 2 – Put Healthy Foods in Plain Sight  Keep grab-n-go fruit on the counter instead of snack foods so they are easily accessible.  Consider moving your refrigerated fruit to a shelf at eye-level instead of keeping i success and confident decision-making. Plus, it feels good to be organized and empowered! Medication use and SEs reviewed with patient. Return in about 2 months (around 4/9/2021) for weight management. Patient verbalizes understanding.

## 2021-02-09 ENCOUNTER — OFFICE VISIT (OUTPATIENT)
Dept: INTERNAL MEDICINE CLINIC | Facility: CLINIC | Age: 41
End: 2021-02-09

## 2021-02-09 VITALS
HEIGHT: 64 IN | BODY MASS INDEX: 31.07 KG/M2 | WEIGHT: 182 LBS | RESPIRATION RATE: 16 BRPM | SYSTOLIC BLOOD PRESSURE: 110 MMHG | DIASTOLIC BLOOD PRESSURE: 76 MMHG | HEART RATE: 94 BPM

## 2021-02-09 DIAGNOSIS — R63.2 BINGE EATING: ICD-10-CM

## 2021-02-09 DIAGNOSIS — E66.9 OBESITY (BMI 30.0-34.9): ICD-10-CM

## 2021-02-09 DIAGNOSIS — Z51.81 ENCOUNTER FOR THERAPEUTIC DRUG MONITORING: Primary | ICD-10-CM

## 2021-02-09 DIAGNOSIS — F43.9 STRESS: ICD-10-CM

## 2021-02-09 PROCEDURE — 99213 OFFICE O/P EST LOW 20 MIN: CPT | Performed by: NURSE PRACTITIONER

## 2021-02-09 PROCEDURE — 3078F DIAST BP <80 MM HG: CPT | Performed by: NURSE PRACTITIONER

## 2021-02-09 PROCEDURE — 3008F BODY MASS INDEX DOCD: CPT | Performed by: NURSE PRACTITIONER

## 2021-02-09 PROCEDURE — 3074F SYST BP LT 130 MM HG: CPT | Performed by: NURSE PRACTITIONER

## 2021-02-09 RX ORDER — LEVONORGESTREL/ETHINYL ESTRADIOL AND ETHINYL ESTRADIOL 100-20(84)
KIT ORAL
COMMUNITY
Start: 2021-01-20

## 2021-02-09 NOTE — PATIENT INSTRUCTIONS
Continue making lifestyle changes that focus on good nutrition, regular exercise and stress management. Medication Plan: Continue current medication regimen, aside from increase Vyvnase to 40 mg daily.     Recommend books for support and encouragement: e benefits, including stress management and better sleep. This year, set measurable and attainable goals for getting more exercise that focus on overall health and well-being, not a number on the scale.   The Centers for Disease Control recommends at least 15 than an apple in your crisper. Having a healthy food environment at home will make it easier to choose healthy options and stay on track with habits that support your health goals. Here are a few steps you can take to set up your home for success.   How t Healthy Foods Readily Available  When you are hungry and in a pinch, it's important to have healthy foods on-hand so you don't make hunger-controlled choices.  This means keeping ready-to-go-snack foods in your kitchen such as fruit paired with nut butter,

## 2021-03-12 RX ORDER — MONTELUKAST SODIUM 10 MG/1
TABLET ORAL
Qty: 90 TABLET | Refills: 0 | Status: SHIPPED | OUTPATIENT
Start: 2021-03-12 | End: 2021-05-04

## 2021-03-12 NOTE — TELEPHONE ENCOUNTER
Last OV: 2/4/20 annual PE    No future appointments.      Latest labs: 2/4/20 ACT last completed    Latest RX: Montelukast Sodium 10 MG Oral Tab90  Tabs 0 refills on 1/22/21    Per protocol, failed due to Asthma Action Score greater than or equal to 20, Prabhjot

## 2021-04-02 ENCOUNTER — TELEPHONE (OUTPATIENT)
Dept: INTERNAL MEDICINE CLINIC | Facility: CLINIC | Age: 41
End: 2021-04-02

## 2021-04-02 DIAGNOSIS — Z00.00 ROUTINE GENERAL MEDICAL EXAMINATION AT A HEALTH CARE FACILITY: Primary | ICD-10-CM

## 2021-04-02 DIAGNOSIS — Z13.0 SCREENING FOR BLOOD DISEASE: ICD-10-CM

## 2021-04-02 DIAGNOSIS — Z13.228 SCREENING FOR METABOLIC DISORDER: ICD-10-CM

## 2021-04-02 DIAGNOSIS — Z13.29 SCREENING FOR THYROID DISORDER: ICD-10-CM

## 2021-04-02 DIAGNOSIS — Z13.220 SCREENING FOR LIPID DISORDERS: ICD-10-CM

## 2021-04-28 ENCOUNTER — OFFICE VISIT (OUTPATIENT)
Dept: INTERNAL MEDICINE CLINIC | Facility: CLINIC | Age: 41
End: 2021-04-28

## 2021-04-28 ENCOUNTER — LAB ENCOUNTER (OUTPATIENT)
Dept: LAB | Age: 41
End: 2021-04-28
Attending: NURSE PRACTITIONER
Payer: COMMERCIAL

## 2021-04-28 VITALS
BODY MASS INDEX: 30.73 KG/M2 | WEIGHT: 180 LBS | HEIGHT: 64 IN | RESPIRATION RATE: 14 BRPM | SYSTOLIC BLOOD PRESSURE: 110 MMHG | DIASTOLIC BLOOD PRESSURE: 72 MMHG | HEART RATE: 76 BPM

## 2021-04-28 DIAGNOSIS — Z13.0 SCREENING FOR BLOOD DISEASE: ICD-10-CM

## 2021-04-28 DIAGNOSIS — F43.9 STRESS: ICD-10-CM

## 2021-04-28 DIAGNOSIS — Z13.29 SCREENING FOR THYROID DISORDER: ICD-10-CM

## 2021-04-28 DIAGNOSIS — E66.9 OBESITY (BMI 30.0-34.9): ICD-10-CM

## 2021-04-28 DIAGNOSIS — R63.2 BINGE EATING: ICD-10-CM

## 2021-04-28 DIAGNOSIS — Z51.81 ENCOUNTER FOR THERAPEUTIC DRUG MONITORING: Primary | ICD-10-CM

## 2021-04-28 DIAGNOSIS — Z00.00 ROUTINE GENERAL MEDICAL EXAMINATION AT A HEALTH CARE FACILITY: ICD-10-CM

## 2021-04-28 DIAGNOSIS — Z13.220 SCREENING FOR LIPID DISORDERS: ICD-10-CM

## 2021-04-28 DIAGNOSIS — Z13.228 SCREENING FOR METABOLIC DISORDER: ICD-10-CM

## 2021-04-28 DIAGNOSIS — F41.9 ANXIETY: ICD-10-CM

## 2021-04-28 PROCEDURE — 85025 COMPLETE CBC W/AUTO DIFF WBC: CPT

## 2021-04-28 PROCEDURE — 99213 OFFICE O/P EST LOW 20 MIN: CPT | Performed by: NURSE PRACTITIONER

## 2021-04-28 PROCEDURE — 80061 LIPID PANEL: CPT

## 2021-04-28 PROCEDURE — 80053 COMPREHEN METABOLIC PANEL: CPT

## 2021-04-28 PROCEDURE — 3074F SYST BP LT 130 MM HG: CPT | Performed by: NURSE PRACTITIONER

## 2021-04-28 PROCEDURE — 3008F BODY MASS INDEX DOCD: CPT | Performed by: NURSE PRACTITIONER

## 2021-04-28 PROCEDURE — 84443 ASSAY THYROID STIM HORMONE: CPT

## 2021-04-28 PROCEDURE — 3078F DIAST BP <80 MM HG: CPT | Performed by: NURSE PRACTITIONER

## 2021-04-28 PROCEDURE — 36415 COLL VENOUS BLD VENIPUNCTURE: CPT

## 2021-04-28 RX ORDER — FLUOXETINE HYDROCHLORIDE 40 MG/1
40 CAPSULE ORAL DAILY
Qty: 90 CAPSULE | Refills: 1 | Status: SHIPPED | OUTPATIENT
Start: 2021-04-28 | End: 2021-12-15

## 2021-04-28 NOTE — PATIENT INSTRUCTIONS
Continue making lifestyle changes that focus on good nutrition, regular exercise and stress management. Medication Plan: Continue current medication regimen, aside from increase Vyvnase to 50 mg daily.     Recommend books for support and encouragement: KIRK eating. Pause while you are eating: Think about how you are feeling about your food in terms of quality and quantity. Know when to stop eating: Stop eating when flavor intensity declines, as it is bound to do.  Do not try to polish off all of the food

## 2021-04-28 NOTE — PROGRESS NOTES
Emely Gardiner is a 41year old female presents today for follow-up on medical weight loss program for the treatment of overweight, obesity, or morbid obesity associated elevated leptin. Patient has lost 2# since LOV 3 month ago.  She has been compliant and affect    ASSESSMENT AND PLAN:  Encounter for therapeutic drug monitoring  (primary encounter diagnosis)  Obesity (bmi 30.0-34. 9)  Anxiety  Stress  Binge eating    No orders of the defined types were placed in this encounter.       Meds & Refills for th several tips to keep in mind when it comes to food and eating. Choose for yourself: Do not get hung up on what other people are eating. Instead, ask yourself what you would like to eat.     Forget about good and bad: Remind yourself that foods fall on a

## 2021-05-04 ENCOUNTER — OFFICE VISIT (OUTPATIENT)
Dept: INTERNAL MEDICINE CLINIC | Facility: CLINIC | Age: 41
End: 2021-05-04

## 2021-05-04 VITALS
OXYGEN SATURATION: 96 % | DIASTOLIC BLOOD PRESSURE: 60 MMHG | HEART RATE: 87 BPM | WEIGHT: 181 LBS | BODY MASS INDEX: 30.9 KG/M2 | TEMPERATURE: 98 F | SYSTOLIC BLOOD PRESSURE: 102 MMHG | HEIGHT: 64 IN

## 2021-05-04 DIAGNOSIS — J45.20 MILD INTERMITTENT ASTHMA WITHOUT COMPLICATION: ICD-10-CM

## 2021-05-04 DIAGNOSIS — F41.9 ANXIETY: ICD-10-CM

## 2021-05-04 DIAGNOSIS — Z00.00 ROUTINE GENERAL MEDICAL EXAMINATION AT A HEALTH CARE FACILITY: Primary | ICD-10-CM

## 2021-05-04 DIAGNOSIS — G43.809 OTHER MIGRAINE WITHOUT STATUS MIGRAINOSUS, NOT INTRACTABLE: ICD-10-CM

## 2021-05-04 DIAGNOSIS — E53.8 B12 DEFICIENCY: ICD-10-CM

## 2021-05-04 PROCEDURE — 3008F BODY MASS INDEX DOCD: CPT | Performed by: NURSE PRACTITIONER

## 2021-05-04 PROCEDURE — 3074F SYST BP LT 130 MM HG: CPT | Performed by: NURSE PRACTITIONER

## 2021-05-04 PROCEDURE — 99396 PREV VISIT EST AGE 40-64: CPT | Performed by: NURSE PRACTITIONER

## 2021-05-04 PROCEDURE — 3078F DIAST BP <80 MM HG: CPT | Performed by: NURSE PRACTITIONER

## 2021-05-04 RX ORDER — MONTELUKAST SODIUM 10 MG/1
TABLET ORAL
Qty: 90 TABLET | Refills: 3 | Status: SHIPPED | OUTPATIENT
Start: 2021-05-04

## 2021-05-04 RX ORDER — SUMATRIPTAN 50 MG/1
TABLET, FILM COATED ORAL
Qty: 27 TABLET | Refills: 1 | Status: SHIPPED | OUTPATIENT
Start: 2021-05-04

## 2021-05-04 NOTE — PROGRESS NOTES
Aleksandr Lang is a 41year old female who presents for a complete physical exam:       Patient complains of:    Asthma  Score 25  singulair daily. She has a cat which the singulair has improved.         Anxiety   Fluoxetine 40mg with wellbutrin 200mg bid TABLETS TWICE A  tablet 1   • Fluticasone Propionate (FLONASE ALLERGY RELIEF NA)      • Vitamin D3 2000 UNITS Oral Cap Take 4,000 Units by mouth daily.         Past Medical History:   Diagnosis Date   • Acute serous otitis media    • Bell's palsy blurred vision or double vision  HEENT: denies nasal congestion, sinus pain or ST  LUNGS: denies shortness of breath with exertion  CARDIOVASCULAR: denies chest pain on exertion  GI: denies abdominal pain,denies heartburn  : denies dysuria, vaginal disch

## 2021-08-15 NOTE — PROGRESS NOTES
Marija Vizcaino is a 41year old female presents today for follow-up on medical weight loss program for the treatment of overweight, obesity, or morbid obesity associated elevated leptin. Patient has lost -5# since LOV 4 month ago.  She has been compliant edema.   GI: +BS  NEURO/MS: motor and sensory grossly intact  PSYCH: pleasant, cooperative, normal mood and affect    ASSESSMENT AND PLAN:  Encounter for therapeutic drug monitoring  (primary encounter diagnosis)  Obesity (bmi 30.0-34. 9)  Binge eating  Anx success with tips listed below to help support your health journey and provide less temptations!   How to Create a [address] in [address] [facility]  December 28, 2020 • Posted in Luz Maria Rubin • By Your Weight Matters Campaign    Why create a healthy you need something from the grocery store. Give your kitchen a new year makeover and organize your food storage areas so you can always locate what you need without distraction.   5 – Eat at [employer] Table  Eating at your kitchen table without distracti skill. Take a Break from the Nitty Gritty  Strict and rigorous routines can be energy-depleting, so try taking a small break.  Hang back from the gym a few times this week and take your workout outside, or indulge in a “cheat food” you've been craving to b

## 2021-08-16 ENCOUNTER — OFFICE VISIT (OUTPATIENT)
Dept: INTERNAL MEDICINE CLINIC | Facility: CLINIC | Age: 41
End: 2021-08-16

## 2021-08-16 VITALS
DIASTOLIC BLOOD PRESSURE: 70 MMHG | HEART RATE: 70 BPM | SYSTOLIC BLOOD PRESSURE: 100 MMHG | RESPIRATION RATE: 14 BRPM | WEIGHT: 185 LBS | HEIGHT: 64 IN | BODY MASS INDEX: 31.58 KG/M2

## 2021-08-16 DIAGNOSIS — F41.9 ANXIETY: ICD-10-CM

## 2021-08-16 DIAGNOSIS — Z51.81 ENCOUNTER FOR THERAPEUTIC DRUG MONITORING: Primary | ICD-10-CM

## 2021-08-16 DIAGNOSIS — E66.9 OBESITY (BMI 30.0-34.9): ICD-10-CM

## 2021-08-16 DIAGNOSIS — R63.2 BINGE EATING: ICD-10-CM

## 2021-08-16 PROCEDURE — 99213 OFFICE O/P EST LOW 20 MIN: CPT | Performed by: NURSE PRACTITIONER

## 2021-08-16 PROCEDURE — 3078F DIAST BP <80 MM HG: CPT | Performed by: NURSE PRACTITIONER

## 2021-08-16 PROCEDURE — 3008F BODY MASS INDEX DOCD: CPT | Performed by: NURSE PRACTITIONER

## 2021-08-16 PROCEDURE — 3074F SYST BP LT 130 MM HG: CPT | Performed by: NURSE PRACTITIONER

## 2021-08-16 RX ORDER — BUPROPION HYDROCHLORIDE 100 MG/1
200 TABLET ORAL 2 TIMES DAILY
Qty: 360 TABLET | Refills: 1 | Status: SHIPPED | OUTPATIENT
Start: 2021-08-16

## 2021-08-16 NOTE — PATIENT INSTRUCTIONS
Continue making lifestyle changes that focus on good nutrition, regular exercise and stress management. Medication Plan: Continue current medication regimen, restarting Vyvanse daily. Consider Wegovy add on - check insurance coverage.     Recommend books examples:  • Calorie-controlled frozen yogurt bars like Yasso vs high-calorie ice cream novelties  • Sparkling water like Bubbly vs soda or fruit juice  • Skinny popcorn vs chips and crackers  • Dessert hummus vs pudding cups or ice cream  4 – Clean and Or don't have much drive left, fill-up your tank with these motivational tips. How to Bust through the [address], Set a Non Scale-related Goal  Don't let the scale have total control of your actions.  Set a NEW goal that will instill in you a figh

## 2021-11-07 ENCOUNTER — PATIENT MESSAGE (OUTPATIENT)
Dept: INTERNAL MEDICINE CLINIC | Facility: CLINIC | Age: 41
End: 2021-11-07

## 2021-11-07 DIAGNOSIS — Z51.81 ENCOUNTER FOR THERAPEUTIC DRUG MONITORING: ICD-10-CM

## 2021-11-07 DIAGNOSIS — R63.2 BINGE EATING: ICD-10-CM

## 2021-11-08 NOTE — TELEPHONE ENCOUNTER
From: Nora Danielle  To: LORETTA Clifford  Sent: 11/7/2021 10:06 PM CST  Subject: Lewis Gibson! I was finally able to find coverage information for [facility] and it says my insurance coverage would be $30 a month or $60 for three months!  I would l

## 2021-11-08 NOTE — TELEPHONE ENCOUNTER
Last seen 8/16/21 and it is noted:  Plan:  Patient has lost -5# since LOV 4 month ago on Wellbutrin 200 mg BID, off Vyvnase 50 mg daily, fluoxetine 40 mg daily with a total weight loss of -1# since initial consult on 11/10/2016 with initial weight of 184#.

## 2021-11-16 PROBLEM — N95.1 MENOPAUSAL SYMPTOMS: Status: ACTIVE | Noted: 2021-11-16

## 2021-11-16 PROCEDURE — 88175 CYTOPATH C/V AUTO FLUID REDO: CPT | Performed by: OBSTETRICS & GYNECOLOGY

## 2021-11-16 PROCEDURE — 87624 HPV HI-RISK TYP POOLED RSLT: CPT | Performed by: OBSTETRICS & GYNECOLOGY

## 2021-12-07 ENCOUNTER — HOSPITAL ENCOUNTER (OUTPATIENT)
Dept: MAMMOGRAPHY | Age: 41
Discharge: HOME | End: 2021-12-07
Attending: OBSTETRICS & GYNECOLOGY
Payer: COMMERCIAL

## 2021-12-07 DIAGNOSIS — Z12.31 VISIT FOR SCREENING MAMMOGRAM: ICD-10-CM

## 2021-12-07 PROCEDURE — 77063 BREAST TOMOSYNTHESIS BI: CPT | Performed by: OBSTETRICS & GYNECOLOGY

## 2021-12-07 PROCEDURE — 77067 SCR MAMMO BI INCL CAD: CPT | Performed by: OBSTETRICS & GYNECOLOGY

## 2021-12-15 ENCOUNTER — OFFICE VISIT (OUTPATIENT)
Dept: INTERNAL MEDICINE CLINIC | Facility: CLINIC | Age: 41
End: 2021-12-15

## 2021-12-15 VITALS
SYSTOLIC BLOOD PRESSURE: 118 MMHG | HEART RATE: 80 BPM | HEIGHT: 64 IN | BODY MASS INDEX: 31.92 KG/M2 | DIASTOLIC BLOOD PRESSURE: 70 MMHG | WEIGHT: 187 LBS | RESPIRATION RATE: 14 BRPM

## 2021-12-15 DIAGNOSIS — F41.9 ANXIETY: ICD-10-CM

## 2021-12-15 DIAGNOSIS — E66.9 OBESITY (BMI 30.0-34.9): ICD-10-CM

## 2021-12-15 DIAGNOSIS — Z51.81 ENCOUNTER FOR THERAPEUTIC DRUG MONITORING: Primary | ICD-10-CM

## 2021-12-15 DIAGNOSIS — R63.2 BINGE EATING: ICD-10-CM

## 2021-12-15 DIAGNOSIS — F43.9 STRESS: ICD-10-CM

## 2021-12-15 PROCEDURE — 3008F BODY MASS INDEX DOCD: CPT | Performed by: NURSE PRACTITIONER

## 2021-12-15 PROCEDURE — 3078F DIAST BP <80 MM HG: CPT | Performed by: NURSE PRACTITIONER

## 2021-12-15 PROCEDURE — 99213 OFFICE O/P EST LOW 20 MIN: CPT | Performed by: NURSE PRACTITIONER

## 2021-12-15 PROCEDURE — 3074F SYST BP LT 130 MM HG: CPT | Performed by: NURSE PRACTITIONER

## 2021-12-15 RX ORDER — SEMAGLUTIDE 0.25 MG/.5ML
0.25 INJECTION, SOLUTION SUBCUTANEOUS WEEKLY
Qty: 2 ML | Refills: 0 | COMMUNITY
Start: 2021-12-15 | End: 2022-01-06

## 2021-12-15 RX ORDER — FLUOXETINE HYDROCHLORIDE 40 MG/1
40 CAPSULE ORAL DAILY
Qty: 90 CAPSULE | Refills: 1 | Status: SHIPPED | OUTPATIENT
Start: 2021-12-15

## 2021-12-15 RX ORDER — SEMAGLUTIDE 0.5 MG/.5ML
0.5 INJECTION, SOLUTION SUBCUTANEOUS WEEKLY
Qty: 2 ML | Refills: 0 | Status: SHIPPED | OUTPATIENT
Start: 2021-12-15 | End: 2022-01-06

## 2021-12-15 NOTE — PROGRESS NOTES
Patient teaching on wegovy  performed. Patient demonstrated back, all questions were answered and patient verbalized understanding.

## 2021-12-15 NOTE — PROGRESS NOTES
Aleksandr Lang is a 35year old female presents today for follow-up on medical weight loss program for the treatment of overweight, obesity, or morbid obesity associated elevated leptin. Patient has lost -2# since LOV 4 month ago.  She has been compliant therapeutic drug monitoring  (primary encounter diagnosis)  Obesity (bmi 30.0-34. 9)  Anxiety  Stress  Binge eating    No orders of the defined types were placed in this encounter.       Meds & Refills for this Visit:  Requested Prescriptions     Signed Pres weekly via sample. Will plan to increase dose monthly if tolerated. Use sample start for first and then increase to next dose I sent to your pharmacy. If cost prohibitive finish monthly sample and then will have to discontinue.      Reevaluate your health g holidays. If you’re currently trying to lose weight, you might prefer putting this goal on hold for a short period. In this case, reset your goal and strive to maintain your weight instead.  No matter what you try, make sure your primary focus is on how you about health. They may take it personally, but you don’t have to. #3 Work on Your Mindset  Think about your relationship with food during the holidays. Why do we focus so much on it? Happiness comes from health and confidence.  It comes from being around Fitness  · Fitness  on SoLatina in 10 DVD series   www. Interactivo. dELiAs  · Sit and Be Fit - Chair exercise series Www.sitandbefit. org  · Hip Hop Fit with Dominick Powers at www.hiphopfit. net    Apps for on the Transportation Group  · [employer] Waterflow 7 Minute Workout (sixto Balance:  The [employer] of Food, Hormones, and Health by Dr. Edil Garcia  · The Complete Guide to fasting by Dr. Haris Pantoja  · Sugar, Salt & Fat by Jimenez Macias, Ph.D, R.D.  · Weight Loss Surgery Will Not Treat Food Addiction by Sravan Winston Ph.D  · The Ga

## 2021-12-15 NOTE — PATIENT INSTRUCTIONS
Continue making lifestyle changes that focus on good nutrition, regular exercise and stress management. Medication Plan: Continue current medication regimen, aside from start [facility] at . 25 mg weekly via sample.  Will plan to increase dose monthly if tole When January gets here, you’ll be empowered to work at [employer]. Don’t Fret the Holidays – Stay in [address]    #1 Plan Carefully for Food at [employer] a goal for the holidays.  If you’re currently trying to lose weight, you might radar. There’s no need to announce your “diet.” Others might not even notice what you are eating. At the same time, try to be understanding of those who do. Not everyone has the same mindset about health. They may take it personally, but you don’t have to. Fitness Centers with multiple locations: [employer]et[employer] (www.Certify), Eat The Frog Fitness (www.28msec. [employer]), Fit Body Bootcamp (www.fitbodybootcamp. [employer])    Online Fitness  · Fitness  on DeepFlex in 10 DVD series   www of Dieting: How to Live for Life by Dr. Alicia Lang M.D. or listen to The [facility] Episode 61: Understanding \"Nutritarian\" Eating w/Dr. Alicia Lang  · Your Body in Balance:  The [employer] of Food, Hormones, and Health by Dr. Tunde Carmen

## 2021-12-17 ENCOUNTER — TELEPHONE (OUTPATIENT)
Dept: INTERNAL MEDICINE CLINIC | Facility: CLINIC | Age: 41
End: 2021-12-17

## 2021-12-17 NOTE — TELEPHONE ENCOUNTER
Received notice from Lily Ogden to do pa for Weogy 0.5 mg  I cannot complete on CMM  Call   Id [id number]     BMI 32.10 kg/m²    Obesity (BMI 30.0-34. 9)  E66.9     Left on hold for 15 min without any rep picking up the line.

## 2021-12-20 NOTE — TELEPHONE ENCOUNTER
I called [facility] and got Rhae Newer given on the phone and the Wegovy 0.5 mg is approved from 12/20/21 to 7/18/2022  Case #[id number]

## 2022-02-24 ENCOUNTER — OFFICE VISIT (OUTPATIENT)
Dept: INTERNAL MEDICINE CLINIC | Facility: CLINIC | Age: 42
End: 2022-02-24
Payer: COMMERCIAL

## 2022-02-24 VITALS
WEIGHT: 182 LBS | BODY MASS INDEX: 31.07 KG/M2 | DIASTOLIC BLOOD PRESSURE: 78 MMHG | RESPIRATION RATE: 14 BRPM | SYSTOLIC BLOOD PRESSURE: 110 MMHG | HEIGHT: 64 IN

## 2022-02-24 DIAGNOSIS — E66.9 OBESITY (BMI 30.0-34.9): ICD-10-CM

## 2022-02-24 DIAGNOSIS — R63.2 BINGE EATING: ICD-10-CM

## 2022-02-24 DIAGNOSIS — F41.9 ANXIETY: ICD-10-CM

## 2022-02-24 DIAGNOSIS — Z51.81 ENCOUNTER FOR THERAPEUTIC DRUG MONITORING: Primary | ICD-10-CM

## 2022-02-24 PROCEDURE — 3078F DIAST BP <80 MM HG: CPT | Performed by: NURSE PRACTITIONER

## 2022-02-24 PROCEDURE — 3008F BODY MASS INDEX DOCD: CPT | Performed by: NURSE PRACTITIONER

## 2022-02-24 PROCEDURE — 3074F SYST BP LT 130 MM HG: CPT | Performed by: NURSE PRACTITIONER

## 2022-02-24 PROCEDURE — 99213 OFFICE O/P EST LOW 20 MIN: CPT | Performed by: NURSE PRACTITIONER

## 2022-02-24 RX ORDER — BUPROPION HYDROCHLORIDE 100 MG/1
200 TABLET ORAL 2 TIMES DAILY
Qty: 360 TABLET | Refills: 1 | Status: CANCELLED | OUTPATIENT
Start: 2022-02-24

## 2022-02-24 RX ORDER — SEMAGLUTIDE 0.25 MG/.5ML
0.25 INJECTION, SOLUTION SUBCUTANEOUS WEEKLY
Qty: 2 ML | Refills: 0 | Status: SHIPPED | OUTPATIENT
Start: 2022-02-24

## 2022-03-12 ENCOUNTER — PATIENT MESSAGE (OUTPATIENT)
Dept: INTERNAL MEDICINE CLINIC | Facility: CLINIC | Age: 42
End: 2022-03-12

## 2022-03-13 NOTE — TELEPHONE ENCOUNTER
From: Alessia Sotelo  To: LORETTA Mooney  Sent: 3/12/2022 11:26 AM CST  Subject: [place]ank! So I just took my third dose like you said (2 pens to equal .5 mg) so you can send the refill in for the new dose now. Thank you!   Karan Dakin

## 2022-03-14 ENCOUNTER — TELEPHONE (OUTPATIENT)
Dept: INTERNAL MEDICINE CLINIC | Facility: CLINIC | Age: 42
End: 2022-03-14

## 2022-03-14 RX ORDER — SEMAGLUTIDE 0.5 MG/.5ML
0.5 INJECTION, SOLUTION SUBCUTANEOUS WEEKLY
Qty: 2 ML | Refills: 0 | Status: SHIPPED | OUTPATIENT
Start: 2022-03-14 | End: 2022-04-05

## 2022-03-24 ENCOUNTER — TELEPHONE (OUTPATIENT)
Dept: INTERNAL MEDICINE CLINIC | Facility: CLINIC | Age: 42
End: 2022-03-24

## 2022-03-24 NOTE — TELEPHONE ENCOUNTER
I called Leticia Du to tell them I got approval and it still does not go through. She will try again in a little bit and call me back.

## 2022-03-24 NOTE — TELEPHONE ENCOUNTER
I received a fax from Donny to apply for PA for Wegovy 0.5 mg  I called [facility] 3/14/22 and did get approval for repeating dose approved. I called Donny and they see the approval but still wont go thru. She is calling the insurance and will let me know if there is something else that needs to be done by our office.

## 2022-03-28 NOTE — TELEPHONE ENCOUNTER
I called [facility] and got Radha since I have done this PA 3 times and it still will not go through at the pharmacy. I asked her what is needed. I have 2 case #'s one for the medicine itself and then a quantity override since we have to have her repeat the dose.    It was approved for only 2 pens and they need 4 pens  It is now sent to appeals and should be approved this afternoon  Case #[id number]

## 2022-04-28 ENCOUNTER — OFFICE VISIT (OUTPATIENT)
Dept: INTERNAL MEDICINE CLINIC | Facility: CLINIC | Age: 42
End: 2022-04-28
Payer: COMMERCIAL

## 2022-04-28 VITALS
BODY MASS INDEX: 30.9 KG/M2 | WEIGHT: 181 LBS | DIASTOLIC BLOOD PRESSURE: 82 MMHG | SYSTOLIC BLOOD PRESSURE: 112 MMHG | HEART RATE: 78 BPM | RESPIRATION RATE: 16 BRPM | HEIGHT: 64 IN

## 2022-04-28 DIAGNOSIS — Z51.81 ENCOUNTER FOR THERAPEUTIC DRUG MONITORING: Primary | ICD-10-CM

## 2022-04-28 DIAGNOSIS — F41.9 ANXIETY: ICD-10-CM

## 2022-04-28 DIAGNOSIS — F43.9 STRESS: ICD-10-CM

## 2022-04-28 DIAGNOSIS — R63.2 BINGE EATING: ICD-10-CM

## 2022-04-28 DIAGNOSIS — E66.9 OBESITY (BMI 30.0-34.9): ICD-10-CM

## 2022-04-28 PROCEDURE — 99213 OFFICE O/P EST LOW 20 MIN: CPT | Performed by: NURSE PRACTITIONER

## 2022-04-28 PROCEDURE — 3008F BODY MASS INDEX DOCD: CPT | Performed by: NURSE PRACTITIONER

## 2022-04-28 PROCEDURE — 3074F SYST BP LT 130 MM HG: CPT | Performed by: NURSE PRACTITIONER

## 2022-04-28 PROCEDURE — 3079F DIAST BP 80-89 MM HG: CPT | Performed by: NURSE PRACTITIONER

## 2022-04-28 RX ORDER — FLUOXETINE HYDROCHLORIDE 40 MG/1
40 CAPSULE ORAL DAILY
Qty: 90 CAPSULE | Refills: 1 | Status: SHIPPED | OUTPATIENT
Start: 2022-04-28

## 2022-04-28 RX ORDER — SEMAGLUTIDE 1 MG/.5ML
1 INJECTION, SOLUTION SUBCUTANEOUS WEEKLY
Qty: 2 ML | Refills: 0 | Status: SHIPPED | OUTPATIENT
Start: 2022-04-28

## 2022-04-28 RX ORDER — BUPROPION HYDROCHLORIDE 100 MG/1
200 TABLET ORAL 2 TIMES DAILY
Qty: 360 TABLET | Refills: 1 | Status: SHIPPED | OUTPATIENT
Start: 2022-04-28

## 2022-04-29 DIAGNOSIS — Z51.81 ENCOUNTER FOR THERAPEUTIC DRUG MONITORING: ICD-10-CM

## 2022-04-29 DIAGNOSIS — R63.2 BINGE EATING: ICD-10-CM

## 2022-04-29 RX ORDER — LISDEXAMFETAMINE DIMESYLATE CAPSULES 50 MG/1
50 CAPSULE ORAL DAILY
Qty: 30 CAPSULE | Refills: 0 | OUTPATIENT
Start: 2022-04-29 | End: 2022-05-29

## 2022-05-26 NOTE — PROGRESS NOTES
CC: Patient presents with:  Weight Check: down 6 lb       HPI:   Obesity, doing well on diethylpropion and wellbutrin. No numbness. No chest pain.        Current Outpatient Prescriptions:  Diethylpropion HCl ER 75 MG Oral Tablet 24 Hr Take 1 tab daily SYSTEMS:   RESPIRATORY: denies shortness of breath   CARDIOVASCULAR: denies chest pain    EXAM:   /78 mmHg  Pulse 84  Resp 16  Ht 64\"  Wt 163 lb  BMI 27.97 kg/m2  GENERAL: A/O x3  HEENT: NCAT, throat is clear, PERRLA  LUNGS: clear to auscultation bi home

## 2022-06-07 ENCOUNTER — TELEPHONE (OUTPATIENT)
Dept: INTERNAL MEDICINE CLINIC | Facility: CLINIC | Age: 42
End: 2022-06-07

## 2022-06-07 RX ORDER — MONTELUKAST SODIUM 10 MG/1
TABLET ORAL
Qty: 90 TABLET | Refills: 0 | Status: SHIPPED | OUTPATIENT
Start: 2022-06-07

## 2022-06-07 NOTE — TELEPHONE ENCOUNTER
Last OV: 5/4/21 annual PE    Future Appointments   Date Time Provider Beny Mccray   6/23/2022  8:20 AM LORETTA Rico [facility]        Latest labs: 4/28/21 TSH, Lipid, CMP and CBC    Latest RX: montelukast- 90 tabs 3 refills on 5/4/21    Per protocol, failed due to below. Rx pending.    Asthma Action Score greater than or equal to 20    Appointment in past 6 or next 3 months     AAP/ACT given in last 12 months

## 2022-06-13 ENCOUNTER — TELEPHONE (OUTPATIENT)
Dept: INTERNAL MEDICINE CLINIC | Facility: CLINIC | Age: 42
End: 2022-06-13

## 2022-06-13 NOTE — TELEPHONE ENCOUNTER
Jovanni Ramos approved for coverage on Epic  Approval Details    Authorized from May 14, 2022 to October 11, 2022   Information received electronically from payer

## 2022-07-26 ENCOUNTER — TELEPHONE (OUTPATIENT)
Dept: INTERNAL MEDICINE CLINIC | Facility: CLINIC | Age: 42
End: 2022-07-26

## 2022-07-26 ENCOUNTER — PATIENT MESSAGE (OUTPATIENT)
Dept: INTERNAL MEDICINE CLINIC | Facility: CLINIC | Age: 42
End: 2022-07-26

## 2022-07-26 DIAGNOSIS — Z13.220 SCREENING FOR LIPID DISORDERS: ICD-10-CM

## 2022-07-26 DIAGNOSIS — Z13.29 SCREENING FOR THYROID DISORDER: ICD-10-CM

## 2022-07-26 DIAGNOSIS — Z00.00 ROUTINE GENERAL MEDICAL EXAMINATION AT A HEALTH CARE FACILITY: Primary | ICD-10-CM

## 2022-07-26 DIAGNOSIS — Z13.228 SCREENING FOR METABOLIC DISORDER: ICD-10-CM

## 2022-07-26 DIAGNOSIS — R63.2 BINGE EATING: Primary | ICD-10-CM

## 2022-07-26 DIAGNOSIS — Z13.0 SCREENING FOR DISORDER OF BLOOD AND BLOOD-FORMING ORGANS: ICD-10-CM

## 2022-07-26 RX ORDER — LIRAGLUTIDE 6 MG/ML
3 INJECTION, SOLUTION SUBCUTANEOUS DAILY
Qty: 15 ML | Refills: 1 | Status: CANCELLED | OUTPATIENT
Start: 2022-07-26

## 2022-07-26 NOTE — TELEPHONE ENCOUNTER
Requesting pernell  LOV: 4/28/22  RTC:  3 months   Last Relevant Labs: na   Filled: 15 ml on 6/9/22 # with 1refills    Future Appointments   Date Time Provider Beny Mccray   8/22/2022  9:40 AM LORETTA Barillas [facility] [id number]   9/21/2022 11:20 AM Crisoforo Hashimoto, APRN [facility] [address] [facility]

## 2022-07-26 NOTE — TELEPHONE ENCOUNTER
From: Brennen Rouse  To: LORETTA Moscoso  Sent: 7/26/2022 12:29 PM CDT  Subject: Vyvanse refill    Hello! Could I please get a refill on my Vyvanse. My next appt with Kavin Padilla is Aug. 22nd. Thank you!   Melissa Cabrera

## 2022-07-26 NOTE — TELEPHONE ENCOUNTER
Future Appointments   Date Time Provider Beny Mccray   9/21/2022 11:20 AM LORETTA Vides [facility] [address] [facility]     Annual Physical   Lab is [facility] [place] [facility]  Pt aware to fast and to complete labs no sooner than 2 weeks prior to physical   No call back required

## 2022-07-26 NOTE — TELEPHONE ENCOUNTER
Requesting Vyvanse  LOV: 4/28/22  RTC: 3 months  Last Relevant Labs: na  Filled: 4/27/22 #30 with 0 refills last [place] 5[place]2/22 [place]    Future Appointments   Date Time Provider Beny Mccray   8/22/2022  9:40 AM LORETTA JeanWEI [id number]

## 2022-07-27 RX ORDER — LIRAGLUTIDE 6 MG/ML
3 INJECTION, SOLUTION SUBCUTANEOUS DAILY
Qty: 15 ML | Refills: 0 | Status: SHIPPED | OUTPATIENT
Start: 2022-07-27

## 2022-08-22 ENCOUNTER — LAB ENCOUNTER (OUTPATIENT)
Dept: LAB | Age: 42
End: 2022-08-22
Attending: NURSE PRACTITIONER
Payer: COMMERCIAL

## 2022-08-22 ENCOUNTER — OFFICE VISIT (OUTPATIENT)
Dept: INTERNAL MEDICINE CLINIC | Facility: CLINIC | Age: 42
End: 2022-08-22
Payer: COMMERCIAL

## 2022-08-22 VITALS
HEART RATE: 78 BPM | SYSTOLIC BLOOD PRESSURE: 115 MMHG | OXYGEN SATURATION: 97 % | HEIGHT: 64 IN | RESPIRATION RATE: 16 BRPM | WEIGHT: 191 LBS | DIASTOLIC BLOOD PRESSURE: 85 MMHG | BODY MASS INDEX: 32.61 KG/M2

## 2022-08-22 DIAGNOSIS — E66.9 OBESITY (BMI 30.0-34.9): ICD-10-CM

## 2022-08-22 DIAGNOSIS — Z13.220 SCREENING FOR LIPID DISORDERS: ICD-10-CM

## 2022-08-22 DIAGNOSIS — Z13.0 SCREENING FOR DISORDER OF BLOOD AND BLOOD-FORMING ORGANS: ICD-10-CM

## 2022-08-22 DIAGNOSIS — Z51.81 ENCOUNTER FOR THERAPEUTIC DRUG MONITORING: Primary | ICD-10-CM

## 2022-08-22 DIAGNOSIS — R63.5 WEIGHT GAIN: ICD-10-CM

## 2022-08-22 DIAGNOSIS — Z13.29 SCREENING FOR THYROID DISORDER: ICD-10-CM

## 2022-08-22 DIAGNOSIS — R63.2 BINGE EATING: ICD-10-CM

## 2022-08-22 DIAGNOSIS — Z00.00 ROUTINE GENERAL MEDICAL EXAMINATION AT A HEALTH CARE FACILITY: ICD-10-CM

## 2022-08-22 DIAGNOSIS — Z13.228 SCREENING FOR METABOLIC DISORDER: ICD-10-CM

## 2022-08-22 LAB
ALBUMIN SERPL-MCNC: 3.8 G/DL (ref 3.4–5)
ALBUMIN/GLOB SERPL: 1 {RATIO} (ref 1–2)
ALP LIVER SERPL-CCNC: 90 U/L
ALT SERPL-CCNC: 24 U/L
ANION GAP SERPL CALC-SCNC: 4 MMOL/L (ref 0–18)
AST SERPL-CCNC: 17 U/L (ref 15–37)
BASOPHILS # BLD AUTO: 0.06 X10(3) UL (ref 0–0.2)
BASOPHILS NFR BLD AUTO: 1.1 %
BILIRUB SERPL-MCNC: 0.4 MG/DL (ref 0.1–2)
BUN BLD-MCNC: 16 MG/DL (ref 7–18)
BUN/CREAT SERPL: 14.7 (ref 10–20)
CALCIUM BLD-MCNC: 9.1 MG/DL (ref 8.5–10.1)
CHLORIDE SERPL-SCNC: 105 MMOL/L (ref 98–112)
CHOLEST SERPL-MCNC: 216 MG/DL (ref ?–200)
CO2 SERPL-SCNC: 30 MMOL/L (ref 21–32)
CREAT BLD-MCNC: 1.09 MG/DL
DEPRECATED RDW RBC AUTO: 44.7 FL (ref 35.1–46.3)
EOSINOPHIL # BLD AUTO: 0.26 X10(3) UL (ref 0–0.7)
EOSINOPHIL NFR BLD AUTO: 4.7 %
ERYTHROCYTE [DISTWIDTH] IN BLOOD BY AUTOMATED COUNT: 13.2 % (ref 11–15)
FASTING PATIENT LIPID ANSWER: YES
FASTING STATUS PATIENT QL REPORTED: YES
GFR SERPLBLD BASED ON 1.73 SQ M-ARVRAT: 65 ML/MIN/1.73M2 (ref 60–?)
GLOBULIN PLAS-MCNC: 3.7 G/DL (ref 2.8–4.4)
GLUCOSE BLD-MCNC: 78 MG/DL (ref 70–99)
HCT VFR BLD AUTO: 43.9 %
HDLC SERPL-MCNC: 62 MG/DL (ref 40–59)
HGB BLD-MCNC: 13.9 G/DL
IMM GRANULOCYTES # BLD AUTO: 0.02 X10(3) UL (ref 0–1)
IMM GRANULOCYTES NFR BLD: 0.4 %
LDLC SERPL CALC-MCNC: 141 MG/DL (ref ?–100)
LYMPHOCYTES # BLD AUTO: 1.64 X10(3) UL (ref 1–4)
LYMPHOCYTES NFR BLD AUTO: 29.5 %
MCH RBC QN AUTO: 28.9 PG (ref 26–34)
MCHC RBC AUTO-ENTMCNC: 31.7 G/DL (ref 31–37)
MCV RBC AUTO: 91.3 FL
MONOCYTES # BLD AUTO: 0.57 X10(3) UL (ref 0.1–1)
MONOCYTES NFR BLD AUTO: 10.3 %
NEUTROPHILS # BLD AUTO: 3.01 X10 (3) UL (ref 1.5–7.7)
NEUTROPHILS # BLD AUTO: 3.01 X10(3) UL (ref 1.5–7.7)
NEUTROPHILS NFR BLD AUTO: 54 %
NONHDLC SERPL-MCNC: 154 MG/DL (ref ?–130)
OSMOLALITY SERPL CALC.SUM OF ELEC: 288 MOSM/KG (ref 275–295)
PLATELET # BLD AUTO: 305 10(3)UL (ref 150–450)
POTASSIUM SERPL-SCNC: 4.2 MMOL/L (ref 3.5–5.1)
PROT SERPL-MCNC: 7.5 G/DL (ref 6.4–8.2)
RBC # BLD AUTO: 4.81 X10(6)UL
SODIUM SERPL-SCNC: 139 MMOL/L (ref 136–145)
TRIGL SERPL-MCNC: 76 MG/DL (ref 30–149)
TSI SER-ACNC: 1.33 MIU/ML (ref 0.36–3.74)
VLDLC SERPL CALC-MCNC: 14 MG/DL (ref 0–30)
WBC # BLD AUTO: 5.6 X10(3) UL (ref 4–11)

## 2022-08-22 PROCEDURE — 84443 ASSAY THYROID STIM HORMONE: CPT

## 2022-08-22 PROCEDURE — 85025 COMPLETE CBC W/AUTO DIFF WBC: CPT

## 2022-08-22 PROCEDURE — 80061 LIPID PANEL: CPT

## 2022-08-22 PROCEDURE — 80053 COMPREHEN METABOLIC PANEL: CPT

## 2022-08-22 RX ORDER — LORATADINE 10 MG/1
CAPSULE, LIQUID FILLED ORAL
COMMUNITY

## 2022-08-22 RX ORDER — TIRZEPATIDE 5 MG/.5ML
5 INJECTION, SOLUTION SUBCUTANEOUS WEEKLY
Qty: 2 ML | Refills: 0 | Status: SHIPPED | OUTPATIENT
Start: 2022-08-22

## 2022-08-29 ENCOUNTER — TELEPHONE (OUTPATIENT)
Dept: INTERNAL MEDICINE CLINIC | Facility: CLINIC | Age: 42
End: 2022-08-29

## 2022-09-14 ENCOUNTER — PATIENT MESSAGE (OUTPATIENT)
Dept: INTERNAL MEDICINE CLINIC | Facility: CLINIC | Age: 42
End: 2022-09-14

## 2022-09-14 NOTE — TELEPHONE ENCOUNTER
Requesting mounjaro  LOV: 8/22/22  RTC: 2 months  Last Relevant Labs: no a1c  Filled: 8/22/22 #2ml with 0 refills  Mounjaro 5 mg    Future Appointments   Date Time Provider Beny Mccray   9/21/2022 11:20 AM LORETTA Marcelino [facility] [address] [facility]

## 2022-09-18 RX ORDER — TIRZEPATIDE 7.5 MG/.5ML
7.5 INJECTION, SOLUTION SUBCUTANEOUS WEEKLY
Qty: 2 ML | Refills: 0 | Status: SHIPPED | OUTPATIENT
Start: 2022-09-18

## 2022-09-21 ENCOUNTER — OFFICE VISIT (OUTPATIENT)
Dept: INTERNAL MEDICINE CLINIC | Facility: CLINIC | Age: 42
End: 2022-09-21

## 2022-09-21 VITALS
SYSTOLIC BLOOD PRESSURE: 106 MMHG | OXYGEN SATURATION: 98 % | HEART RATE: 86 BPM | TEMPERATURE: 98 F | WEIGHT: 180 LBS | BODY MASS INDEX: 31.11 KG/M2 | DIASTOLIC BLOOD PRESSURE: 80 MMHG | HEIGHT: 63.78 IN

## 2022-09-21 DIAGNOSIS — Z00.00 ROUTINE GENERAL MEDICAL EXAMINATION AT A HEALTH CARE FACILITY: Primary | ICD-10-CM

## 2022-09-21 DIAGNOSIS — J45.20 MILD INTERMITTENT ASTHMA WITHOUT COMPLICATION: ICD-10-CM

## 2022-09-21 DIAGNOSIS — F41.9 ANXIETY: ICD-10-CM

## 2022-09-21 PROCEDURE — 3079F DIAST BP 80-89 MM HG: CPT | Performed by: NURSE PRACTITIONER

## 2022-09-21 PROCEDURE — 99396 PREV VISIT EST AGE 40-64: CPT | Performed by: NURSE PRACTITIONER

## 2022-09-21 PROCEDURE — 3074F SYST BP LT 130 MM HG: CPT | Performed by: NURSE PRACTITIONER

## 2022-09-21 PROCEDURE — 3008F BODY MASS INDEX DOCD: CPT | Performed by: NURSE PRACTITIONER

## 2022-09-21 RX ORDER — MONTELUKAST SODIUM 10 MG/1
10 TABLET ORAL DAILY
Qty: 90 TABLET | Refills: 3 | Status: SHIPPED | OUTPATIENT
Start: 2022-09-21

## 2022-09-21 NOTE — PROGRESS NOTES
PAP - UTD Until 11/16/2024  MAMMOGRAM - Due Ordered By Lesly Portillo MD      ASTHMA PT  ACT - Due   AAP - Due      ASTHMA CONTROL TEST (ACT):    1. In the past 4 weeks, how much of the time did your asthma keep you from getting as much done at work, school or at home? All the time (1)  Most of the time (2)  Some of the time (3)  A little of the time (4)  None of the time (5)       5    2. During the past 4 weeks, how often have you had shortness of breath? More than once a day (1)  Once a day (2)  3-6 times a week (3)  Once or twice a week (4)  Not at all (5)        5    3. During the past 4 weeks, how often did your asthma symptoms (wheezing, coughing, SOB, chest tightness or pain) wake you up at night or earlier than usual in the morning?    4 or more night per week (1)  2-3 nights per week (2)  Once a week (3)  Once or twice (4)  Not at all (5)        5    4. During the past 4 weeks, how often have you used your rescue inhaler or nebulizer medication? 3 or more times per day (1)  1-2 times per day (2)  2 or 3 times per week (3)  Once a week or less (4)  Not at all (5)        5    5. How would you rate your asthma control in the last 4 weeks?     Not controlled (1)  Poorly controlled (2)  Somewhat controlled (3)  Well controlled (4)  Completely controlled (5)      5      TOTAL ACT SCORE:      25

## 2022-10-28 RX ORDER — TIRZEPATIDE 7.5 MG/.5ML
7.5 INJECTION, SOLUTION SUBCUTANEOUS WEEKLY
Qty: 2 ML | Refills: 0 | Status: CANCELLED
Start: 2022-10-28

## 2022-10-31 ENCOUNTER — OFFICE VISIT (OUTPATIENT)
Dept: INTERNAL MEDICINE CLINIC | Facility: CLINIC | Age: 42
End: 2022-10-31
Payer: COMMERCIAL

## 2022-10-31 VITALS
WEIGHT: 170.63 LBS | SYSTOLIC BLOOD PRESSURE: 108 MMHG | HEIGHT: 64 IN | HEART RATE: 76 BPM | DIASTOLIC BLOOD PRESSURE: 60 MMHG | BODY MASS INDEX: 29.13 KG/M2

## 2022-10-31 DIAGNOSIS — F43.9 STRESS: ICD-10-CM

## 2022-10-31 DIAGNOSIS — F41.9 ANXIETY: ICD-10-CM

## 2022-10-31 DIAGNOSIS — Z51.81 ENCOUNTER FOR THERAPEUTIC DRUG MONITORING: ICD-10-CM

## 2022-10-31 DIAGNOSIS — E66.9 OBESITY (BMI 30.0-34.9): ICD-10-CM

## 2022-10-31 PROCEDURE — 3078F DIAST BP <80 MM HG: CPT | Performed by: NURSE PRACTITIONER

## 2022-10-31 PROCEDURE — 3074F SYST BP LT 130 MM HG: CPT | Performed by: NURSE PRACTITIONER

## 2022-10-31 PROCEDURE — 99213 OFFICE O/P EST LOW 20 MIN: CPT | Performed by: NURSE PRACTITIONER

## 2022-10-31 PROCEDURE — 3008F BODY MASS INDEX DOCD: CPT | Performed by: NURSE PRACTITIONER

## 2022-10-31 RX ORDER — LISDEXAMFETAMINE DIMESYLATE 50 MG
50 CAPSULE ORAL DAILY
COMMUNITY
Start: 2022-09-22 | End: 2022-10-31

## 2022-10-31 RX ORDER — TIRZEPATIDE 10 MG/.5ML
10 INJECTION, SOLUTION SUBCUTANEOUS WEEKLY
Qty: 2 ML | Refills: 0 | Status: SHIPPED | OUTPATIENT
Start: 2022-10-31

## 2022-10-31 RX ORDER — BUPROPION HYDROCHLORIDE 100 MG/1
200 TABLET ORAL 2 TIMES DAILY
Qty: 360 TABLET | Refills: 1 | Status: CANCELLED
Start: 2022-10-31

## 2022-10-31 RX ORDER — FLUOXETINE HYDROCHLORIDE 40 MG/1
40 CAPSULE ORAL DAILY
Qty: 90 CAPSULE | Refills: 1 | Status: SHIPPED | OUTPATIENT
Start: 2022-10-31

## 2022-10-31 RX ORDER — BUPROPION HYDROCHLORIDE 150 MG/1
150 TABLET ORAL DAILY
Qty: 30 TABLET | Refills: 0 | Status: SHIPPED | OUTPATIENT
Start: 2022-10-31

## 2022-11-23 DIAGNOSIS — Z51.81 ENCOUNTER FOR THERAPEUTIC DRUG MONITORING: ICD-10-CM

## 2022-11-23 DIAGNOSIS — E66.9 OBESITY (BMI 30.0-34.9): ICD-10-CM

## 2022-11-23 RX ORDER — TIRZEPATIDE 10 MG/.5ML
10 INJECTION, SOLUTION SUBCUTANEOUS WEEKLY
Qty: 2 ML | Refills: 0 | Status: CANCELLED | OUTPATIENT
Start: 2022-11-23

## 2022-11-23 NOTE — TELEPHONE ENCOUNTER
Requesting mounjaro   LOV: 10/31/22  RTC: 3 months   Last Relevant Labs:   Filled:2ml on 10/31/22  # with0  Refills( 10mg)    No future appointments.

## 2022-12-02 RX ORDER — TIRZEPATIDE 12.5 MG/.5ML
12.5 INJECTION, SOLUTION SUBCUTANEOUS WEEKLY
Qty: 2 ML | Refills: 0 | Status: SHIPPED | OUTPATIENT
Start: 2022-12-02

## 2022-12-30 RX ORDER — TIRZEPATIDE 12.5 MG/.5ML
12.5 INJECTION, SOLUTION SUBCUTANEOUS WEEKLY
Qty: 2 ML | Refills: 0 | Status: CANCELLED | OUTPATIENT
Start: 2022-12-30

## 2023-01-05 RX ORDER — TIRZEPATIDE 15 MG/.5ML
15 INJECTION, SOLUTION SUBCUTANEOUS WEEKLY
Qty: 2 ML | Refills: 0 | Status: SHIPPED | OUTPATIENT
Start: 2023-01-05

## 2023-01-26 ENCOUNTER — OFFICE VISIT (OUTPATIENT)
Dept: INTERNAL MEDICINE CLINIC | Facility: CLINIC | Age: 43
End: 2023-01-26
Payer: COMMERCIAL

## 2023-01-26 VITALS
RESPIRATION RATE: 16 BRPM | HEIGHT: 64 IN | HEART RATE: 92 BPM | WEIGHT: 170 LBS | SYSTOLIC BLOOD PRESSURE: 110 MMHG | DIASTOLIC BLOOD PRESSURE: 66 MMHG | BODY MASS INDEX: 29.02 KG/M2

## 2023-01-26 DIAGNOSIS — R53.83 FATIGUE, UNSPECIFIED TYPE: ICD-10-CM

## 2023-01-26 DIAGNOSIS — E78.00 ELEVATED LDL CHOLESTEROL LEVEL: ICD-10-CM

## 2023-01-26 DIAGNOSIS — E66.9 OBESITY (BMI 30.0-34.9): ICD-10-CM

## 2023-01-26 DIAGNOSIS — F43.9 STRESS: ICD-10-CM

## 2023-01-26 DIAGNOSIS — F41.9 ANXIETY: ICD-10-CM

## 2023-01-26 DIAGNOSIS — R63.2 BINGE EATING: ICD-10-CM

## 2023-01-26 DIAGNOSIS — Z51.81 ENCOUNTER FOR THERAPEUTIC DRUG MONITORING: Primary | ICD-10-CM

## 2023-01-26 PROCEDURE — 3078F DIAST BP <80 MM HG: CPT | Performed by: NURSE PRACTITIONER

## 2023-01-26 PROCEDURE — 3074F SYST BP LT 130 MM HG: CPT | Performed by: NURSE PRACTITIONER

## 2023-01-26 PROCEDURE — 3008F BODY MASS INDEX DOCD: CPT | Performed by: NURSE PRACTITIONER

## 2023-01-26 PROCEDURE — 99214 OFFICE O/P EST MOD 30 MIN: CPT | Performed by: NURSE PRACTITIONER

## 2023-01-26 RX ORDER — TIRZEPATIDE 15 MG/.5ML
15 INJECTION, SOLUTION SUBCUTANEOUS WEEKLY
Qty: 2 ML | Refills: 3 | Status: SHIPPED | OUTPATIENT
Start: 2023-01-26

## 2023-01-26 RX ORDER — BUPROPION HYDROCHLORIDE 150 MG/1
150 TABLET ORAL EVERY MORNING
Qty: 90 TABLET | Refills: 0 | Status: SHIPPED | OUTPATIENT
Start: 2023-01-26

## 2023-01-26 NOTE — PATIENT INSTRUCTIONS
Continue making lifestyle changes that focus on good nutrition, regular exercise and stress management. Medication Plan: Continue current medication. Remain off Vyvanse. Plan Wegovy in the future if needed. Set your food environment up for success with tips listed below to help support your health journey and provide less temptations! Check fasting labs. Consider adding JuicePlus+ (www.juiceplus. com) to fill the gap in nutrition if needed. How to Create a [address] in [address] [place] [address]  December 28, 2020  Posted in Armando Sahni Nutrition  By Your [place] Matters Campaign    Why create a healthy food environment in your home? Every day, decisions we make about food are influenced by hundreds of factors, many of them subconscious. For example, an open box of cookies on the counter is more tempting than an apple in your crisper. Having a healthy food environment at home will make it easier to choose healthy options and stay on track with habits that support your health goals. Here are a few steps you can take to set up your home for success. How to Build a [address] of Less-healthy Food  These foods are okay in moderation, but they can be enjoyed outside of the house in a fun and empowering way. Go through your kitchen fridge, freezer, pantry, cabinets and counters to toss out foods that aren't helping you meet your health goals. 2 - Put Healthy Foods in Plain Sight  Keep grab-n-go fruit on the counter instead of snack foods so they are easily accessible. Consider moving your refrigerated fruit to a shelf at eye-level instead of keeping it in the crisper. Vice versa, move less healthy foods to the crisper so they aren't always in your line of sight. 3 - Find Healthy Substitutions  Feeling deprived of your favorite foods can make you miserable and resentful. Instead, experiment with healthier alternatives.  Here are some examples:  Calorie-controlled frozen yogurt bars like Yasso vs high-calorie ice cream novelties  Sparkling water like Bubbly vs soda or fruit juice  Skinny popcorn vs chips and crackers  Dessert hummus vs pudding cups or ice cream  4 - Clean and Organize  A messy kitchen can make it hard to find healthy choices when you are hungry or short on time. It also makes it hard to locate whether or not you need something from the grocery store. Give your kitchen a new year makeover and organize your food storage areas so you can always locate what you need without distraction. 5 - Eat at [employer] Table  Eating at your kitchen table without distractions is a way to eat more mindfully and enjoy time with your family. Eating on the couch while watching shows or movies can encourage mindless eating and consuming more calories than intended. 6 - Keep Healthy Foods Readily Available  When you are hungry and in a pinch, it's important to have healthy foods on-hand so you don't make hunger-controlled choices. This means keeping ready-to-go-snack foods in your kitchen such as fruit paired with nut butter, hummus and carrots, lunch meat and cheese roll-ups, etc. It may also help to keep pre-made freezer meals on-hand (something you've cooked in advance) for busy weeknights when you don't have time to prepare food. Turning your home into a healthy food environment can set you up for success and confident decision-making. Plus, it feels good to be organized and empowered! Ellen Magallon Yellow/Orange Red Purple   Monday Tuesday Wednesday Thursday Friday Saturday Sunday          Fruits and vegetables provide nutrients vital for health and maintenance of your body. Those individuals eating fruits and vegetables are likely to have a reduced risk of some chronic diseases. Dietary fiber from vegetables and fruits helps reduce cholesterol, support a healthy bowel and provides a feeling of fullness with fewer calories.  This in turn supports weight loss. Eat the rainbow daily to support your weight loss journey!

## 2023-02-02 ENCOUNTER — LAB ENCOUNTER (OUTPATIENT)
Dept: LAB | Age: 43
End: 2023-02-02
Attending: NURSE PRACTITIONER
Payer: COMMERCIAL

## 2023-02-02 DIAGNOSIS — E78.00 ELEVATED LDL CHOLESTEROL LEVEL: ICD-10-CM

## 2023-02-02 DIAGNOSIS — E66.9 OBESITY (BMI 30.0-34.9): ICD-10-CM

## 2023-02-02 DIAGNOSIS — F41.9 ANXIETY: ICD-10-CM

## 2023-02-02 DIAGNOSIS — R53.83 FATIGUE, UNSPECIFIED TYPE: ICD-10-CM

## 2023-02-02 DIAGNOSIS — Z51.81 ENCOUNTER FOR THERAPEUTIC DRUG MONITORING: ICD-10-CM

## 2023-02-02 LAB
ALBUMIN SERPL-MCNC: 3.9 G/DL (ref 3.4–5)
ALBUMIN/GLOB SERPL: 1.4 {RATIO} (ref 1–2)
ALP LIVER SERPL-CCNC: 82 U/L
ALT SERPL-CCNC: 20 U/L
ANION GAP SERPL CALC-SCNC: 6 MMOL/L (ref 0–18)
AST SERPL-CCNC: 12 U/L (ref 15–37)
BASOPHILS # BLD AUTO: 0.07 X10(3) UL (ref 0–0.2)
BASOPHILS NFR BLD AUTO: 0.9 %
BILIRUB SERPL-MCNC: 0.4 MG/DL (ref 0.1–2)
BUN BLD-MCNC: 12 MG/DL (ref 7–18)
BUN/CREAT SERPL: 12.1 (ref 10–20)
CALCIUM BLD-MCNC: 9 MG/DL (ref 8.5–10.1)
CHLORIDE SERPL-SCNC: 108 MMOL/L (ref 98–112)
CHOLEST SERPL-MCNC: 166 MG/DL (ref ?–200)
CO2 SERPL-SCNC: 29 MMOL/L (ref 21–32)
CREAT BLD-MCNC: 0.99 MG/DL
DEPRECATED RDW RBC AUTO: 41.9 FL (ref 35.1–46.3)
EOSINOPHIL # BLD AUTO: 0.61 X10(3) UL (ref 0–0.7)
EOSINOPHIL NFR BLD AUTO: 7.7 %
ERYTHROCYTE [DISTWIDTH] IN BLOOD BY AUTOMATED COUNT: 13.2 % (ref 11–15)
FASTING PATIENT LIPID ANSWER: YES
FASTING STATUS PATIENT QL REPORTED: YES
FOLATE SERPL-MCNC: 7.8 NG/ML (ref 8.7–?)
GFR SERPLBLD BASED ON 1.73 SQ M-ARVRAT: 72 ML/MIN/1.73M2 (ref 60–?)
GLOBULIN PLAS-MCNC: 2.8 G/DL (ref 2.8–4.4)
GLUCOSE BLD-MCNC: 83 MG/DL (ref 70–99)
HCT VFR BLD AUTO: 40.5 %
HDLC SERPL-MCNC: 52 MG/DL (ref 40–59)
HGB BLD-MCNC: 13.5 G/DL
IMM GRANULOCYTES # BLD AUTO: 0.02 X10(3) UL (ref 0–1)
IMM GRANULOCYTES NFR BLD: 0.3 %
LDLC SERPL CALC-MCNC: 101 MG/DL (ref ?–100)
LYMPHOCYTES # BLD AUTO: 1.52 X10(3) UL (ref 1–4)
LYMPHOCYTES NFR BLD AUTO: 19.2 %
MCH RBC QN AUTO: 29.2 PG (ref 26–34)
MCHC RBC AUTO-ENTMCNC: 33.3 G/DL (ref 31–37)
MCV RBC AUTO: 87.7 FL
MONOCYTES # BLD AUTO: 0.95 X10(3) UL (ref 0.1–1)
MONOCYTES NFR BLD AUTO: 12 %
NEUTROPHILS # BLD AUTO: 4.75 X10 (3) UL (ref 1.5–7.7)
NEUTROPHILS # BLD AUTO: 4.75 X10(3) UL (ref 1.5–7.7)
NEUTROPHILS NFR BLD AUTO: 59.9 %
NONHDLC SERPL-MCNC: 114 MG/DL (ref ?–130)
OSMOLALITY SERPL CALC.SUM OF ELEC: 295 MOSM/KG (ref 275–295)
PLATELET # BLD AUTO: 284 10(3)UL (ref 150–450)
POTASSIUM SERPL-SCNC: 4.2 MMOL/L (ref 3.5–5.1)
PROT SERPL-MCNC: 6.7 G/DL (ref 6.4–8.2)
RBC # BLD AUTO: 4.62 X10(6)UL
SODIUM SERPL-SCNC: 143 MMOL/L (ref 136–145)
T4 FREE SERPL-MCNC: 1.1 NG/DL (ref 0.8–1.7)
TRIGL SERPL-MCNC: 65 MG/DL (ref 30–149)
TSI SER-ACNC: 0.86 MIU/ML (ref 0.36–3.74)
VIT B12 SERPL-MCNC: 787 PG/ML (ref 193–986)
VIT D+METAB SERPL-MCNC: 61.5 NG/ML (ref 30–100)
VLDLC SERPL CALC-MCNC: 11 MG/DL (ref 0–30)
WBC # BLD AUTO: 7.9 X10(3) UL (ref 4–11)

## 2023-02-02 PROCEDURE — 84443 ASSAY THYROID STIM HORMONE: CPT

## 2023-02-02 PROCEDURE — 82306 VITAMIN D 25 HYDROXY: CPT

## 2023-02-02 PROCEDURE — 82607 VITAMIN B-12: CPT

## 2023-02-02 PROCEDURE — 80061 LIPID PANEL: CPT

## 2023-02-02 PROCEDURE — 84439 ASSAY OF FREE THYROXINE: CPT

## 2023-02-02 PROCEDURE — 80053 COMPREHEN METABOLIC PANEL: CPT

## 2023-02-02 PROCEDURE — 82746 ASSAY OF FOLIC ACID SERUM: CPT

## 2023-02-02 PROCEDURE — 85025 COMPLETE CBC W/AUTO DIFF WBC: CPT

## 2023-03-02 DIAGNOSIS — E66.9 OBESITY (BMI 30.0-34.9): ICD-10-CM

## 2023-03-02 DIAGNOSIS — F43.9 STRESS: ICD-10-CM

## 2023-03-02 DIAGNOSIS — F41.9 ANXIETY: ICD-10-CM

## 2023-03-02 DIAGNOSIS — Z51.81 ENCOUNTER FOR THERAPEUTIC DRUG MONITORING: ICD-10-CM

## 2023-03-02 NOTE — TELEPHONE ENCOUNTER
Fax to refill fluoxetine from [facility]    Requesting fluoxetine  LOV: 1/26/23  RTC: 3 months  Last Relevant Labs: na  Filled: 10/31/22 #90 with 1 refills    No future appointments. Sent my chart to schedule for April now. Needs to mail order instead.   pended

## 2023-03-05 RX ORDER — FLUOXETINE HYDROCHLORIDE 40 MG/1
40 CAPSULE ORAL DAILY
Qty: 90 CAPSULE | Refills: 0 | Status: SHIPPED | OUTPATIENT
Start: 2023-03-05

## 2023-04-22 DIAGNOSIS — Z51.81 ENCOUNTER FOR THERAPEUTIC DRUG MONITORING: ICD-10-CM

## 2023-04-22 DIAGNOSIS — E66.9 OBESITY (BMI 30.0-34.9): ICD-10-CM

## 2023-04-26 ENCOUNTER — OFFICE VISIT (OUTPATIENT)
Dept: INTERNAL MEDICINE CLINIC | Facility: CLINIC | Age: 43
End: 2023-04-26
Payer: COMMERCIAL

## 2023-04-26 VITALS
HEIGHT: 64 IN | WEIGHT: 138 LBS | DIASTOLIC BLOOD PRESSURE: 80 MMHG | HEART RATE: 90 BPM | OXYGEN SATURATION: 98 % | SYSTOLIC BLOOD PRESSURE: 116 MMHG | BODY MASS INDEX: 23.56 KG/M2 | RESPIRATION RATE: 16 BRPM

## 2023-04-26 DIAGNOSIS — F41.9 ANXIETY: ICD-10-CM

## 2023-04-26 DIAGNOSIS — F43.9 STRESS: ICD-10-CM

## 2023-04-26 DIAGNOSIS — E66.3 OVERWEIGHT (BMI 25.0-29.9): ICD-10-CM

## 2023-04-26 DIAGNOSIS — Z86.39 HISTORY OF OBESITY: ICD-10-CM

## 2023-04-26 DIAGNOSIS — Z51.81 ENCOUNTER FOR THERAPEUTIC DRUG MONITORING: Primary | ICD-10-CM

## 2023-04-26 PROCEDURE — 3008F BODY MASS INDEX DOCD: CPT | Performed by: NURSE PRACTITIONER

## 2023-04-26 PROCEDURE — 3079F DIAST BP 80-89 MM HG: CPT | Performed by: NURSE PRACTITIONER

## 2023-04-26 PROCEDURE — 3074F SYST BP LT 130 MM HG: CPT | Performed by: NURSE PRACTITIONER

## 2023-04-26 PROCEDURE — 99213 OFFICE O/P EST LOW 20 MIN: CPT | Performed by: NURSE PRACTITIONER

## 2023-04-26 RX ORDER — FLUOXETINE HYDROCHLORIDE 20 MG/1
20 CAPSULE ORAL DAILY
Qty: 30 CAPSULE | Refills: 0 | Status: SHIPPED | OUTPATIENT
Start: 2023-04-26

## 2023-04-26 RX ORDER — TIRZEPATIDE 15 MG/.5ML
15 INJECTION, SOLUTION SUBCUTANEOUS WEEKLY
Qty: 2 ML | Refills: 3 | Status: SHIPPED | OUTPATIENT
Start: 2023-04-26

## 2023-04-26 RX ORDER — BUPROPION HYDROCHLORIDE 150 MG/1
150 TABLET ORAL EVERY MORNING
Qty: 90 TABLET | Refills: 3 | Status: SHIPPED | OUTPATIENT
Start: 2023-04-26

## 2023-04-28 PROBLEM — Z86.39 HISTORY OF OBESITY: Status: ACTIVE | Noted: 2023-04-28

## 2023-04-28 PROBLEM — E66.3 OVERWEIGHT (BMI 25.0-29.9): Status: ACTIVE | Noted: 2023-04-28

## 2023-05-04 RX ORDER — TIRZEPATIDE 15 MG/.5ML
INJECTION, SOLUTION SUBCUTANEOUS
Qty: 2 ML | Refills: 2 | OUTPATIENT
Start: 2023-05-04

## 2023-06-28 DIAGNOSIS — Z51.81 ENCOUNTER FOR THERAPEUTIC DRUG MONITORING: ICD-10-CM

## 2023-06-28 DIAGNOSIS — Z86.39 HISTORY OF OBESITY: ICD-10-CM

## 2023-06-28 DIAGNOSIS — E66.3 OVERWEIGHT (BMI 25.0-29.9): ICD-10-CM

## 2023-06-28 DIAGNOSIS — F41.9 ANXIETY: ICD-10-CM

## 2023-06-28 RX ORDER — BUPROPION HYDROCHLORIDE 150 MG/1
TABLET ORAL
Qty: 90 TABLET | Refills: 3 | OUTPATIENT
Start: 2023-06-28

## 2023-06-29 ENCOUNTER — PATIENT MESSAGE (OUTPATIENT)
Dept: INTERNAL MEDICINE CLINIC | Facility: CLINIC | Age: 43
End: 2023-06-29

## 2023-06-30 NOTE — TELEPHONE ENCOUNTER
Mounjaro 15mg- last dose 3 weeks ago, please advise, what other dose she can try or need alternative?

## 2023-07-07 RX ORDER — TIRZEPATIDE 12.5 MG/.5ML
12.5 INJECTION, SOLUTION SUBCUTANEOUS WEEKLY
Qty: 2 ML | Refills: 0 | Status: SHIPPED | OUTPATIENT
Start: 2023-07-07

## 2023-07-19 ENCOUNTER — TELEPHONE (OUTPATIENT)
Dept: INTERNAL MEDICINE CLINIC | Facility: CLINIC | Age: 43
End: 2023-07-19

## 2023-07-19 DIAGNOSIS — Z51.81 ENCOUNTER FOR THERAPEUTIC DRUG MONITORING: ICD-10-CM

## 2023-07-19 DIAGNOSIS — R63.2 BINGE EATING: Primary | ICD-10-CM

## 2023-07-19 NOTE — TELEPHONE ENCOUNTER
PA entered in [employer] for [facility]  Noted she had original BMI over 30 and was on Mounjaro - can no longer obtain  [facility] given to maintain loss and she will be weaned off.

## 2023-07-27 NOTE — TELEPHONE ENCOUNTER
Appeal letter along with the chart notes faxed to [facility] appeal department. Pt notified via my chart.

## 2023-08-28 ENCOUNTER — OFFICE VISIT (OUTPATIENT)
Dept: INTERNAL MEDICINE CLINIC | Facility: CLINIC | Age: 43
End: 2023-08-28
Payer: COMMERCIAL

## 2023-08-28 VITALS
SYSTOLIC BLOOD PRESSURE: 118 MMHG | HEIGHT: 64 IN | BODY MASS INDEX: 26.98 KG/M2 | DIASTOLIC BLOOD PRESSURE: 80 MMHG | OXYGEN SATURATION: 98 % | RESPIRATION RATE: 18 BRPM | HEART RATE: 84 BPM | WEIGHT: 158 LBS

## 2023-08-28 DIAGNOSIS — Z86.39 HISTORY OF OBESITY: ICD-10-CM

## 2023-08-28 DIAGNOSIS — F41.9 ANXIETY: ICD-10-CM

## 2023-08-28 DIAGNOSIS — R63.2 BINGE EATING: ICD-10-CM

## 2023-08-28 DIAGNOSIS — F43.9 STRESS: ICD-10-CM

## 2023-08-28 DIAGNOSIS — Z51.81 ENCOUNTER FOR THERAPEUTIC DRUG MONITORING: Primary | ICD-10-CM

## 2023-08-28 DIAGNOSIS — E66.3 OVERWEIGHT (BMI 25.0-29.9): ICD-10-CM

## 2023-08-28 PROCEDURE — 3079F DIAST BP 80-89 MM HG: CPT | Performed by: NURSE PRACTITIONER

## 2023-08-28 PROCEDURE — 3008F BODY MASS INDEX DOCD: CPT | Performed by: NURSE PRACTITIONER

## 2023-08-28 PROCEDURE — 99214 OFFICE O/P EST MOD 30 MIN: CPT | Performed by: NURSE PRACTITIONER

## 2023-08-28 PROCEDURE — 3074F SYST BP LT 130 MM HG: CPT | Performed by: NURSE PRACTITIONER

## 2023-08-28 RX ORDER — FLUOXETINE HYDROCHLORIDE 40 MG/1
40 CAPSULE ORAL DAILY
Qty: 90 CAPSULE | Refills: 1 | Status: SHIPPED | OUTPATIENT
Start: 2023-08-28

## 2023-08-28 RX ORDER — BUPROPION HYDROCHLORIDE 300 MG/1
300 TABLET ORAL EVERY MORNING
Qty: 90 TABLET | Refills: 1 | Status: SHIPPED | OUTPATIENT
Start: 2023-08-28

## 2023-09-26 PROCEDURE — 87624 HPV HI-RISK TYP POOLED RSLT: CPT | Performed by: OBSTETRICS & GYNECOLOGY

## 2023-09-26 PROCEDURE — 88175 CYTOPATH C/V AUTO FLUID REDO: CPT | Performed by: OBSTETRICS & GYNECOLOGY

## 2023-12-21 ENCOUNTER — PATIENT MESSAGE (OUTPATIENT)
Dept: INTERNAL MEDICINE CLINIC | Facility: CLINIC | Age: 43
End: 2023-12-21

## 2023-12-21 DIAGNOSIS — R63.2 BINGE EATING: ICD-10-CM

## 2023-12-21 DIAGNOSIS — Z51.81 ENCOUNTER FOR THERAPEUTIC DRUG MONITORING: ICD-10-CM

## 2023-12-21 NOTE — TELEPHONE ENCOUNTER
Requesting   Requested Prescriptions     Pending Prescriptions Disp Refills    lisdexamfetamine (VYVANSE) 40 MG Oral Cap 30 capsule 0     Sig: Take 1 capsule (40 mg total) by mouth daily.      LOV: 8/28/23  RTC: 3 months  Filled: 10/29/23 #30 with 0 refills    Future Appointments   Date Time Provider Beny Mccray   12/28/2023  1:00 PM Mili Shea MD [facility] [address]   1/8/2024  1:00 PM LORETTA Peterson [facility] [id number]   1/16/2024 11:00  [place]

## 2023-12-21 NOTE — TELEPHONE ENCOUNTER
From: Pj Summers  To: Theador Dandy  Sent: 12/21/2023 12:45 PM CST  Subject: Vyvanse refill    Hello,    I have made a follow up appt on January 8th. Could I get a Vyvanse refill for now? To the [address]? Thank you!   Lily Ordaz

## 2023-12-24 RX ORDER — LISDEXAMFETAMINE DIMESYLATE CAPSULES 40 MG/1
40 CAPSULE ORAL DAILY
Qty: 30 CAPSULE | Refills: 0 | Status: SHIPPED | OUTPATIENT
Start: 2023-12-24 | End: 2024-01-23

## 2024-01-08 ENCOUNTER — OFFICE VISIT (OUTPATIENT)
Dept: INTERNAL MEDICINE CLINIC | Facility: CLINIC | Age: 44
End: 2024-01-08
Payer: COMMERCIAL

## 2024-01-08 VITALS
DIASTOLIC BLOOD PRESSURE: 70 MMHG | WEIGHT: 163 LBS | RESPIRATION RATE: 18 BRPM | SYSTOLIC BLOOD PRESSURE: 104 MMHG | OXYGEN SATURATION: 98 % | HEART RATE: 80 BPM | BODY MASS INDEX: 27.83 KG/M2 | HEIGHT: 64 IN

## 2024-01-08 DIAGNOSIS — E66.3 OVERWEIGHT (BMI 25.0-29.9): ICD-10-CM

## 2024-01-08 DIAGNOSIS — Z51.81 ENCOUNTER FOR THERAPEUTIC DRUG MONITORING: Primary | ICD-10-CM

## 2024-01-08 DIAGNOSIS — E78.00 HYPERCHOLESTEROLEMIA: ICD-10-CM

## 2024-01-08 DIAGNOSIS — R63.2 BINGE EATING: ICD-10-CM

## 2024-01-08 DIAGNOSIS — F41.9 ANXIETY: ICD-10-CM

## 2024-01-08 DIAGNOSIS — Z78.0 MENOPAUSE: ICD-10-CM

## 2024-01-08 DIAGNOSIS — Z86.39 HISTORY OF OBESITY: ICD-10-CM

## 2024-01-08 DIAGNOSIS — F43.9 STRESS: ICD-10-CM

## 2024-01-08 PROCEDURE — 3074F SYST BP LT 130 MM HG: CPT | Performed by: NURSE PRACTITIONER

## 2024-01-08 PROCEDURE — 3008F BODY MASS INDEX DOCD: CPT | Performed by: NURSE PRACTITIONER

## 2024-01-08 PROCEDURE — 3078F DIAST BP <80 MM HG: CPT | Performed by: NURSE PRACTITIONER

## 2024-01-08 PROCEDURE — 99214 OFFICE O/P EST MOD 30 MIN: CPT | Performed by: NURSE PRACTITIONER

## 2024-01-08 RX ORDER — LISDEXAMFETAMINE DIMESYLATE CAPSULES 40 MG/1
40 CAPSULE ORAL DAILY
Qty: 30 CAPSULE | Refills: 0 | Status: SHIPPED | OUTPATIENT
Start: 2024-01-08 | End: 2024-02-07

## 2024-01-08 RX ORDER — TIRZEPATIDE 2.5 MG/.5ML
2.5 INJECTION, SOLUTION SUBCUTANEOUS WEEKLY
Qty: 2 ML | Refills: 0 | Status: SHIPPED | OUTPATIENT
Start: 2024-01-08

## 2024-01-08 RX ORDER — BUPROPION HYDROCHLORIDE 300 MG/1
300 TABLET ORAL EVERY MORNING
Qty: 90 TABLET | Refills: 1 | Status: SHIPPED | OUTPATIENT
Start: 2024-01-08

## 2024-01-08 RX ORDER — FLUOXETINE HYDROCHLORIDE 40 MG/1
40 CAPSULE ORAL DAILY
Qty: 90 CAPSULE | Refills: 1 | Status: SHIPPED | OUTPATIENT
Start: 2024-01-08

## 2024-01-08 RX ORDER — LISDEXAMFETAMINE DIMESYLATE CAPSULES 40 MG/1
40 CAPSULE ORAL DAILY
Qty: 30 CAPSULE | Refills: 0 | Status: SHIPPED | OUTPATIENT
Start: 2024-03-10 | End: 2024-04-09

## 2024-01-08 RX ORDER — TIRZEPATIDE 5 MG/.5ML
5 INJECTION, SOLUTION SUBCUTANEOUS WEEKLY
Qty: 2 ML | Refills: 1 | Status: SHIPPED | OUTPATIENT
Start: 2024-01-08

## 2024-01-08 RX ORDER — LISDEXAMFETAMINE DIMESYLATE CAPSULES 40 MG/1
40 CAPSULE ORAL DAILY
Qty: 30 CAPSULE | Refills: 0 | Status: SHIPPED | OUTPATIENT
Start: 2024-02-08 | End: 2024-03-09

## 2024-01-08 NOTE — PATIENT INSTRUCTIONS
Continue making lifestyle changes that focus on good nutrition, regular exercise and stress management.    Medication Plan: Continue current medication regimen aside from add Zepbound. Start Zepbound at 2.5 mg weekly. After 4 weeks start the next dose of 5 mg weekly with additional refill. Visit the website www.zepbound.Flexis for coupon and further education on dosing. This medication may require a prior authorization (PA) by your insurance. A PA may take one week plus to complete and our office will be in touch during this process if needed.     Tips while taking an injectable weight loss medication:    Be an intuitive eater. Listen to your hunger and fullness signals, stopping when you are full.  Consume protein and produce in your day, striving for a rainbow of color of produce.  Reduce portions to staring size of 1 cup size and check in with your gut to see if you are full. Set the timer to slow down your eating pace to allow for 15-20 minutes to complete a meal.  Reduce refined sugars and high fat foods, as they may contribute to greater side effects of nausea and heartburn.  Stop eating 3 hours before bedtime to allow your food to digest.  Remain hydrated with water or non caloric and non caffeine beverages.  Use over the counter quinn lozenge/supplement to help reduce nausea if needed.    Reset and renew in 2024!    Set your food environment up for success with tips listed below to help support your health journey and provide less temptations!  How to Create a Healthy Food Environment in Your Home  December 28, 2020  Posted in Blog, Nutrition  By Your [employer] Campaign    Why create a healthy food environment in your home? Every day, decisions we make about food are influenced by hundreds of factors, many of them subconscious. For example, an open box of cookies on the counter is more tempting than an apple in your crisper.  Having a healthy food environment at home will make it easier to choose  healthy options and stay on track with habits that support your health goals.  Here are a few steps you can take to set up your home for success.  How to Build a Healthy Food Environment  1 - Clear Your Home of Less-healthy Food  These foods are okay in moderation, but they can be enjoyed outside of the house in a fun and empowering way. Go through your kitchen fridge, freezer, pantry, cabinets and counters to toss out foods that aren't helping you meet your health goals.  2 - Put Healthy Foods in Plain Sight  Keep grab-n-go fruit on the counter instead of snack foods so they are easily accessible. Consider moving your refrigerated fruit to a shelf at eye-level instead of keeping it in the crisper. Vice versa, move less healthy foods to the crisper so they aren't always in your line of sight.  3 - Find Healthy Substitutions  Feeling deprived of your favorite foods can make you miserable and resentful. Instead, experiment with healthier alternatives. Here are some examples:  Calorie-controlled frozen yogurt bars like Yasso vs high-calorie ice cream novelties  Sparkling water like Bubbly vs soda or fruit juice  Skinny popcorn vs chips and crackers  Dessert hummus vs pudding cups or ice cream  4 - Clean and Organize  A messy kitchen can make it hard to find healthy choices when you are hungry or short on time. It also makes it hard to locate whether or not you need something from the grocery store. Give your kitchen a new year makeover and organize your food storage areas so you can always locate what you need without distraction.  5 - Eat at Your Kitchen Table  Eating at your kitchen table without distractions is a way to eat more mindfully and enjoy time with your family. Eating on the couch while watching shows or movies can encourage mindless eating and consuming more calories than intended.  6 - Keep Healthy Foods Readily Available  When you are hungry and in a pinch, it's important to have healthy foods on-hand  so you don't make hunger-controlled choices. This means keeping ready-to-go-snack foods in your kitchen such as fruit paired with nut butter, hummus and carrots, lunch meat and cheese roll-ups, etc. It may also help to keep pre-made freezer meals on-hand (something you've cooked in advance) for busy weeknights when you don't have time to prepare food.  Turning your home into a healthy food environment can set you up for success and confident decision-making. Plus, it feels good to be organized and empowered!  Balanced Nutrition includes:     Build the mentality of Food 4 Fuel. Clean eating with whole foods and eliminating/reducing ultra processed foods.  Be an intuitive eater and using mindful eating practices.  Eat a balanced plate with protein and produce at all meals: 1/4 plate- protein, 1/2 plate non starchy veggie, and 1/4 plate fruit or complex carbohydrate.  Drink water with all meals.  Eliminate/reduce late night eating by stopping after 7pm. Allowing your body to fast for 12 hours (drink only water, tea or black coffee without any additives).          Mindful Eating Tips:  When we sit down to a meal by ourselves or with friends, it's easy to zone out and disconnect from our bodies. But, if you approach eating a meal with the intent to stay mindful and present, you will be able to enjoy yourself and walk away from the table feeling good about yourself and your choices. Here are several tips to keep in mind when it comes to food and eating.    Choose for yourself: Do not get hung up on what other people are eating. Instead, ask yourself what you would like to eat.    Forget about good and bad: Remind yourself that foods fall on a nutritional continuum (high value/low value), not on a moral continuum (good/bad).    Stay clear of guilt or shame: Refrain from allowing guilt or shame to contaminate your eating decisions. Avoid secret eating and get clear on who you are really hiding from if you eat in  secret.    Choose foods that you like: Do not eat foods that you do not find satisfying or enjoyable. Eating that way will make you feel like you are on a diet.    Look before you eat: Before you eat, look at your food, its portion size, and presentation. Breathe deeply. Look again before taking a mouthful.    Chew every mouthful: Chewing a lot helps to thoroughly release the flavor of foods. Let food sit on your tongue. This allows your taste buds to absorb the flavor and transmit messages about your appetite to your brain.    Talk or eat: When you are talking, stop eating. When you are eating, stop talking.    Stay connected: Pay attention to your body's appetite signals while you are eating.    Pause while you are eating: Think about how you are feeling about your food in terms of quality and quantity.    Know when to stop eating: Stop eating when flavor intensity declines, as it is bound to do. Do not try to polish off all of the food in front of you. Instead, aim for the moment when flavor peaks and you feel an internal “ah” of satisfaction--then stop.    Evaluate how full you are: Keep asking yourself while you are eating, “Am I still hungry?” and “Am I satisfied?”    by Jovana Gray [facility]  and

## 2024-01-08 NOTE — PROGRESS NOTES
Arleth Denis is a 43 year old female presents today for follow-up on medical weight loss program for the treatment of overweight, obesity, or morbid obesity associated elevated leptin.    Patient has lost -5# since LOV in 8/2023. She has been compliant with medication. Food noise returned all day without pattern. Lack of energy and motivation after the holidays. Planning to reestablish fitness this year and make time for self care. Binge eating controlled on Vyvnase, but noted emotional eating present.    [facility] Follow Up    Question 1/5/2024 12:21 PM CST - Filed by Patient   Please describe a success moment: None   Please describe a challenging moment/needs for improvement: Motivation   Please complete this 24 hour food journal, listing everything you had to eat in the past day. Include the average time of day you ate these meals at    List foods, qty and prep for breakfast: Nothing   List foods, qty and prep for lunch. Chicken sandwich, fries   List foods, qty and prep for dinner. Spicy Korean Steak noodle bowl   List foods, qty and prep for snacks. Peanut butter crackers   List the types and qty of fluids consumed Water and diet soda   On average, how many meals did you eat out per week? 3   Exercise    How many days per week are you active or exercise 0   On average, how many days were anaerobic (strength/resistance) exercises performed? 0   On average, how many days were aerobic (cardio) exercises performed? 0   Perceived level of exertion on a scale of 1-5, with 5 being very intense:    Stress    Average stress level on a scale of 1-10, with 10 being extremely stressed: 4   If greater than 5/1O how would you grade your coping mechanisms? moderate   Sleep hours and integrity    How many hours of uninterrupted sleep do you get a night: 7   Do you feel rested in the morning: No   If no, what may have been disrupting your sleep? 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 Marital status:  with teenager and set of triplets    PMH reviewed. Cardiac disorders: hx of PVCs, Hypothyroid: no, Mental illness: anxiety- controlled, Glaucoma: no, Kidney stones: no, Eating disorder: no, CAMRON: no, Migraines: yes-controlled, Seizures: no, Liver disease: no, Renal disease: no, Diabetes: no, Gastroparesis: no, Constipation: no, Joint pains: no, Cancer hx: no, FMH thyroid or pancreatic cancer: Dad - pancreatic cancer    REVIEW OF SYSTEMS:  GENERAL: feels well otherwise  LUNGS: denies shortness of breath with exertion  CARDIOVASCULAR: denies chest pain on exertion, denies palpitations or pedal edema  GI: denies abdominal pain.  No N/V/D/C  MUSCULOSKELETAL: no joint pain  NEURO: denies headaches or dizziness  PSYCH: denies change in behavior or mood, denies feeling sad or depressed. Patient verbalizes understanding.  Answers submitted by the patient for this visit:  Medical Weight Loss Follow Up (Submitted on 1/5/2024)  If greater than 5/1O how would you grade your coping mechanisms?: moderate      EXAM:  /70   Pulse 80   Resp 18   Ht 5' 4\" (1.626 m)   Wt 163 lb (73.9 kg)   SpO2 98%   BMI 27.98 kg/m²    GENERAL: well developed, well nourished, in no apparent distress, overweight  EYES: conjunctiva pink, sclera non icteric  LUNGS: CTA in all fields, breathing non labored  CARDIO: RRR without murmur, normal S1 and S2 without clicks or gallops, no pedal edema.   GI: +BS  NEURO/MS: motor and sensory grossly intact  PSYCH: pleasant, cooperative, normal mood and affect    ASSESSMENT AND PLAN:  Encounter Diagnoses   Name Primary?    Encounter for therapeutic drug monitoring Yes    Overweight (BMI 25.0-29.9)     History of obesity     Hypercholesterolemia     Binge eating     Stress     Menopause     Anxiety        No orders of the defined types were placed in this  encounter.      Meds & Refills for this Visit:  Requested Prescriptions     Signed Prescriptions Disp Refills    Tirzepatide-Weight Management (ZEPBOUND) 2.5 MG/0.5ML Subcutaneous Solution Auto-injector 2 mL 0     Sig: Inject 2.5 mg into the skin once a week.    Tirzepatide-Weight Management (ZEPBOUND) 5 MG/0.5ML Subcutaneous Solution Auto-injector 2 mL 1     Sig: Inject 5 mg into the skin once a week. Start after completing full 4 weeks on 2.5 mg weekly dose.    FLUoxetine HCl 40 MG Oral Cap 90 capsule 1     Sig: Take 1 capsule (40 mg total) by mouth daily.    buPROPion  MG Oral Tablet 24 Hr 90 tablet 1     Sig: Take 1 tablet (300 mg total) by mouth every morning.    lisdexamfetamine (VYVANSE) 40 MG Oral Cap 30 capsule 0     Sig: Take 1 capsule (40 mg total) by mouth daily.    lisdexamfetamine (VYVANSE) 40 MG Oral Cap 30 capsule 0     Sig: Take 1 capsule (40 mg total) by mouth daily.    lisdexamfetamine (VYVANSE) 40 MG Oral Cap 30 capsule 0     Sig: Take 1 capsule (40 mg total) by mouth daily.       Imaging & Consults:  None      Plan:  Patient has lost -5# since LOV in 8/2023 on Wellbutrin  mg daily, fluoxetine 40 mg daily, Vyvnase 40 mg daily with a total weight loss of 39# since initial consult on 11/10/2016 with initial weight of 184#. Weight loss goal: 145#. CPM aside from add Zepbound as directed. Hx of Wegovy- off d/t no supply and then refusal d/t BMI. Hx of Saxenda. No Topamax d/t SEs of paresthesia.  on setting up a healthy food environment, mindful and balanced plate eating. See patient instructions below for additional plans and patient counseling.        Patient Instructions   Continue making lifestyle changes that focus on good nutrition, regular exercise and stress management.    Medication Plan: Continue current medication regimen aside from add Zepbound. Start Zepbound at 2.5 mg weekly. After 4 weeks start the next dose of 5 mg weekly with additional refill. Visit the website  [email or web address] for coupon and further education on dosing. This medication may require a prior authorization (PA) by your insurance. A PA may take one week plus to complete and our office will be in touch during this process if needed.     Tips while taking an injectable weight loss medication:    Be an intuitive eater. Listen to your hunger and fullness signals, stopping when you are full.  Consume protein and produce in your day, striving for a rainbow of color of produce.  Reduce portions to staring size of 1 cup size and check in with your gut to see if you are full. Set the timer to slow down your eating pace to allow for 15-20 minutes to complete a meal.  Reduce refined sugars and high fat foods, as they may contribute to greater side effects of nausea and heartburn.  Stop eating 3 hours before bedtime to allow your food to digest.  Remain hydrated with water or non caloric and non caffeine beverages.  Use over the counter quinn lozenge/supplement to help reduce nausea if needed.    Reset and renew in 2024!    Set your food environment up for success with tips listed below to help support your health journey and provide less temptations!  How to Create a Healthy Food Environment in Your Home  December 28, 2020  Posted in Blog, Nutrition  By Your Weight Mat[employer] Campaign    Why create a healthy food environment in your home? Every day, decisions we make about food are influenced by hundreds of factors, many of them subconscious. For example, an open box of cookies on the counter is more tempting than an apple in your crisper.  Having a healthy food environment at home will make it easier to choose healthy options and stay on track with habits that support your health goals.  Here are a few steps you can take to set up your home for success.  How to Build a Healthy Food Environment  1 - Clear Your Home of Less-healthy Food  These foods are okay in moderation, but they can be enjoyed outside of the house  in a fun and empowering way. Go through your kitchen fridge, freezer, pantry, cabinets and counters to toss out foods that aren't helping you meet your health goals.  2 - Put Healthy Foods in Plain Sight  Keep grab-n-go fruit on the counter instead of snack foods so they are easily accessible. Consider moving your refrigerated fruit to a shelf at eye-level instead of keeping it in the crisper. Vice versa, move less healthy foods to the crisper so they aren't always in your line of sight.  3 - Find Healthy Substitutions  Feeling deprived of your favorite foods can make you miserable and resentful. Instead, experiment with healthier alternatives. Here are some examples:  Calorie-controlled frozen yogurt bars like Yasso vs high-calorie ice cream novelties  Sparkling water like Bubbly vs soda or fruit juice  Skinny popcorn vs chips and crackers  Dessert hummus vs pudding cups or ice cream  4 - Clean and Organize  A messy kitchen can make it hard to find healthy choices when you are hungry or short on time. It also makes it hard to locate whether or not you need something from the grocery store. Give your kitchen a new year makeover and organize your food storage areas so you can always locate what you need without distraction.  5 - Eat at Your Kitchen Table  Eating at your kitchen table without distractions is a way to eat more mindfully and enjoy time with your family. Eating on the couch while watching shows or movies can encourage mindless eating and consuming more calories than intended.  6 - Keep Healthy Foods Readily Available  When you are hungry and in a pinch, it's important to have healthy foods on-hand so you don't make hunger-controlled choices. This means keeping ready-to-go-snack foods in your kitchen such as fruit paired with nut butter, hummus and carrots, lunch meat and cheese roll-ups, etc. It may also help to keep pre-made freezer meals on-hand (something you've cooked in advance) for busy weeknights  when you don't have time to prepare food.  Turning your home into a healthy food environment can set you up for success and confident decision-making. Plus, it feels good to be organized and empowered!  Balanced Nutrition includes:     Build the mentality of Food 4 Fuel. Clean eating with whole foods and eliminating/reducing ultra processed foods.  Be an intuitive eater and using mindful eating practices.  Eat a balanced plate with protein and produce at all meals: 1/4 plate- protein, 1/2 plate non starchy veggie, and 1/4 plate fruit or complex carbohydrate.  Drink water with all meals.  Eliminate/reduce late night eating by stopping after 7pm. Allowing your body to fast for 12 hours (drink only water, tea or black coffee without any additives).          Mindful Eating Tips:  When we sit down to a meal by ourselves or with friends, it's easy to zone out and disconnect from our bodies. But, if you approach eating a meal with the intent to stay mindful and present, you will be able to enjoy yourself and walk away from the table feeling good about yourself and your choices. Here are several tips to keep in mind when it comes to food and eating.    Choose for yourself: Do not get hung up on what other people are eating. Instead, ask yourself what you would like to eat.    Forget about good and bad: Remind yourself that foods fall on a nutritional continuum (high value/low value), not on a moral continuum (good/bad).    Stay clear of guilt or shame: Refrain from allowing guilt or shame to contaminate your eating decisions. Avoid secret eating and get clear on who you are really hiding from if you eat in secret.    Choose foods that you like: Do not eat foods that you do not find satisfying or enjoyable. Eating that way will make you feel like you are on a diet.    Look before you eat: Before you eat, look at your food, its portion size, and presentation. Breathe deeply. Look again before taking a mouthful.    Chew every  mouthful: Chewing a lot helps to thoroughly release the flavor of foods. Let food sit on your tongue. This allows your taste buds to absorb the flavor and transmit messages about your appetite to your brain.    Talk or eat: When you are talking, stop eating. When you are eating, stop talking.    Stay connected: Pay attention to your body's appetite signals while you are eating.    Pause while you are eating: Think about how you are feeling about your food in terms of quality and quantity.    Know when to stop eating: Stop eating when flavor intensity declines, as it is bound to do. Do not try to polish off all of the food in front of you. Instead, aim for the moment when flavor peaks and you feel an internal “ah” of satisfaction--then stop.    Evaluate how full you are: Keep asking yourself while you are eating, “Am I still hungry?” and “Am I satisfied?”    by [facility]ana [facility]  and       Medication use and SEs reviewed with patient.    Return in about 3 months (around 4/8/2024) for weight management via clinic or VV.    DOCUMENTATION OF TIME SPENT: Code selection for this visit was based on time spent : 30 minutes on date of service in preparing to see the patient, obtaining and/or reviewing separately obtained history, performing a medically appropriate examination, counseling and educating the patient/family/caregiver, ordering medications or testing, referring and communicating with other healthcare providers, documenting clinical information in the electronic medical record, independently interpreting results and communicating results to the patient/family/caregiver and care coordination with the patient's other providers.

## 2024-01-12 ENCOUNTER — TELEPHONE (OUTPATIENT)
Dept: INTERNAL MEDICINE CLINIC | Facility: CLINIC | Age: 44
End: 2024-01-12

## 2024-01-14 NOTE — TELEPHONE ENCOUNTER
Pa entered for zepbound in [facility]  Attached fda shortages  Attached prior use of phentermine  Awaiting decision    Arleth Denis ([place]: [id number])

## 2024-01-14 NOTE — TELEPHONE ENCOUNTER
Zepbound approved  Arleth Denis (Key: [id number])  PA Case ID #: [id number]  Rx #: [id number]  Need Help? Call us at [phone]  Outcome  Approved today  CaseId:[id number];Status:Approved;Review Type:Prior Auth;Coverage Start Date:12/15/2023;Coverage End Date:09/10/2024;  Authorization Expiration Date: 9/9/2024  Drug  Zepbound 2.5MG/0.5ML pen-injectors  ePA cloud logo  Form  [employer] Electronic PA Form (2017 NCPDP)

## 2024-02-02 ENCOUNTER — HOSPITAL ENCOUNTER (OUTPATIENT)
Dept: MAMMOGRAPHY | Age: 44
Discharge: HOME | End: 2024-02-02
Attending: OBSTETRICS & GYNECOLOGY
Payer: COMMERCIAL

## 2024-02-02 DIAGNOSIS — Z12.31 ENCOUNTER FOR SCREENING MAMMOGRAM FOR BREAST CANCER: ICD-10-CM

## 2024-02-02 PROCEDURE — 77067 SCR MAMMO BI INCL CAD: CPT | Performed by: OBSTETRICS & GYNECOLOGY

## 2024-02-02 PROCEDURE — 77063 BREAST TOMOSYNTHESIS BI: CPT | Performed by: OBSTETRICS & GYNECOLOGY

## 2024-02-13 RX ORDER — MONTELUKAST SODIUM 10 MG/1
10 TABLET ORAL DAILY
Qty: 90 TABLET | Refills: 0 | Status: SHIPPED | OUTPATIENT
Start: 2024-02-13

## 2024-02-13 NOTE — TELEPHONE ENCOUNTER
Asthma & COPD Medication Protocol Failed02/13/2024 09:21 AM   Protocol Details Asthma Action Score greater than or equal to 20    Appointment in past 6 or next 3 months    AAP/ACT given in last 12 months     Requested Prescriptions     Pending Prescriptions Disp Refills    montelukast 10 MG Oral Tab 90 tablet 0     Sig: Take 1 tablet (10 mg total) by mouth daily.       LOV:  9--sd-physical    LAST CPE: 9--sd-physical    Last Labs: 2-2-2023-cbc,cmp,tsh    Last Refill: 9-- 90 tabs with 3 refills     Your appointments       Date & Time Appointment Department (Center)    Mar 21, 2024 10:30 AM CDT Adult Physical with Linda Koch APRN [facility], [address] [place] [address] ([facility] [address] [place] [address]/[facility])    PLEASE NOTE - Most insurances allow a Complete Physical once every 366 days. Please schedule accordingly.    Please arrive 15 minutes prior to your scheduled appointment. Please also bring your Insurance card, Photo ID, and your medication bottles or a list of your current medication.    If you no longer require this appointment, please contact your physician office to cancel.              [facility], [address] [place] [address]  [facility] [address] [place] [address]/[address] [address] [phone]

## 2024-02-27 ENCOUNTER — PATIENT MESSAGE (OUTPATIENT)
Dept: INTERNAL MEDICINE CLINIC | Facility: CLINIC | Age: 44
End: 2024-02-27

## 2024-02-27 NOTE — TELEPHONE ENCOUNTER
Requesting   Zepbound increase     LOV: 1/8/24  RTC: 3 months  Filled: 1/8/24 #2 with 0 refills    Future Appointments   Date Time Provider Department Center   3/21/2024 10:30 AM Linda Koch APRN [facility]   4/30/2024  2:00 PM Wendy Whiting APRN [facility]

## 2024-02-27 NOTE — TELEPHONE ENCOUNTER
From: Arleth Denis  To: Wendy Whiting  Sent: 2/27/2024 9:07 AM CST  Subject: Next Zepbound dose    Woo Nguyen,    I’m almost done with my second month of Zepbound (5mg). I have not had any side effects and I have lost 2 pounds. I will schedule a follow up for April like we discussed at the last visit but can I get the next dose called in to the pharmacy in the meantime?     Thanks!   Arleth

## 2024-03-03 RX ORDER — TIRZEPATIDE 7.5 MG/.5ML
7.5 INJECTION, SOLUTION SUBCUTANEOUS WEEKLY
Qty: 2 ML | Refills: 1 | Status: SHIPPED | OUTPATIENT
Start: 2024-03-03

## 2024-03-04 ENCOUNTER — TELEPHONE (OUTPATIENT)
Dept: INTERNAL MEDICINE CLINIC | Facility: CLINIC | Age: 44
End: 2024-03-04

## 2024-03-04 DIAGNOSIS — Z13.29 SCREENING FOR THYROID DISORDER: ICD-10-CM

## 2024-03-04 DIAGNOSIS — Z00.00 ROUTINE GENERAL MEDICAL EXAMINATION AT A HEALTH CARE FACILITY: Primary | ICD-10-CM

## 2024-03-04 DIAGNOSIS — Z13.220 SCREENING FOR LIPID DISORDERS: ICD-10-CM

## 2024-03-04 DIAGNOSIS — Z13.228 SCREENING FOR METABOLIC DISORDER: ICD-10-CM

## 2024-03-04 DIAGNOSIS — Z13.0 SCREENING FOR DISORDER OF BLOOD AND BLOOD-FORMING ORGANS: ICD-10-CM

## 2024-04-02 ENCOUNTER — PATIENT MESSAGE (OUTPATIENT)
Dept: INTERNAL MEDICINE CLINIC | Facility: CLINIC | Age: 44
End: 2024-04-02

## 2024-04-02 DIAGNOSIS — Z86.39 HISTORY OF OBESITY: ICD-10-CM

## 2024-04-02 DIAGNOSIS — E66.3 OVERWEIGHT (BMI 25.0-29.9): Primary | ICD-10-CM

## 2024-04-02 DIAGNOSIS — E78.00 HYPERCHOLESTEROLEMIA: ICD-10-CM

## 2024-04-02 RX ORDER — TIRZEPATIDE 7.5 MG/.5ML
7.5 INJECTION, SOLUTION SUBCUTANEOUS WEEKLY
Qty: 2 ML | Refills: 1 | Status: SHIPPED | OUTPATIENT
Start: 2024-04-02 | End: 2024-04-04

## 2024-04-02 NOTE — TELEPHONE ENCOUNTER
[facility] sent this  Outpatient Medication Detail     Disp Refills Start End    Tirzepatide-Weight Management (ZEPBOUND) 7.5 MG/0.5ML Subcutaneous Solution Auto-injector 2 mL 1 3/3/2024 --    Sig - Route: Inject 7.5 mg into the skin once a week. - Subcutaneous    Sent to pharmacy as: Zepbound 7.5 MG/0.5ML Subcutaneous Solution Auto-injector (Tirzepatide-Weight Management)    E-Prescribing Status: Receipt confirmed by pharmacy (3/3/2024 11:24 AM CST)      Pharmacy    [id number]

## 2024-04-02 NOTE — TELEPHONE ENCOUNTER
From: Arleth Denis  To: Wendy Whiting  Sent: 4/2/2024 10:56 AM CDT  Subject: Zepbound coverage     Hello!  Just an FYI my insurance will only cover Zepbound through my mail order pharmacy and not retail anymore so I [facility] and it says it has to ask for a new Rx.     Thanks!  Arleth

## 2024-04-05 RX ORDER — TIRZEPATIDE 7.5 MG/.5ML
7.5 INJECTION, SOLUTION SUBCUTANEOUS WEEKLY
Qty: 6 ML | Refills: 0 | Status: SHIPPED | OUTPATIENT
Start: 2024-04-05

## 2024-04-12 NOTE — TELEPHONE ENCOUNTER
Which [facility]:  [facility]    Prescription Refill Request - Patient advised can take 48-72 hours.      Name of Medication (strength, dose, qty requested:         montelukast 10 MG Oral Tab 90 tablet 0 2/13/2024 --   Sig:   Take 1 tablet (10 mg total) by mouth daily.

## 2024-04-15 RX ORDER — MONTELUKAST SODIUM 10 MG/1
10 TABLET ORAL DAILY
Qty: 90 TABLET | Refills: 0 | Status: SHIPPED | OUTPATIENT
Start: 2024-04-15

## 2024-04-15 NOTE — TELEPHONE ENCOUNTER
Requested Prescriptions     Pending Prescriptions Disp Refills    montelukast 10 MG Oral Tab 90 tablet 0     Sig: Take 1 tablet (10 mg total) by mouth daily.       LOV: 9--sd-physical     Last Labs: 2-2-2023-cbc,cmp,tsh,lipid    Last Refill: 2--90 tabs with 0 refills     Your appointments       Date & Time Appointment Department ([place])    Apr 16, 2024 10:45 AM CDT Laboratory Visit with REF LENO Krishnamurthy, [address] ([facility] [place])        Apr 23, 2024 10:30 AM CDT Adult Physical with Linda Koch APRN [facility], [address] [place] [address] ([facility] [address] [place] [address]/[facility])    PLEASE NOTE - Most insurances allow a Complete Physical once every 366 days. Please schedule accordingly.    Please arrive 15 minutes prior to your scheduled appointment. Please also bring your Insurance card, Photo ID, and your medication bottles or a list of your current medication.    If you no longer require this appointment, please contact your physician office to cancel.        Apr 30, 2024 2:00 PM CDT Follow Up Video Visit with Wendy Whiting APRN [employer] [facility], [address] [place] [address] ([facility] [place])    Please verify your telehealth insurance benefits prior to your appointment.    You must be in the [facility] during the virtual visit.     Please use the [employer] and launch the video visit 10 minutes prior to your scheduled appointment time to ensure your camera and microphone are working properly. Once the video visit has started you will be placed in a waiting room until the provider begins the visit.     You will receive an email confirmation with instructions.  If you have questions, call your doctor's office directly.    If you are having issues or need to use a desktop/laptop, please follow the below steps:        1.       Close out all other open apps (could be competing for audio resources)  2.       Disable Bluetooth  3.       Reboot  mobile device before joining the video  4.       Come off Wi-Fi and switch over to Data    Please see our Video Visit Tip Sheet if you need additional assistance.     If you believe this is an emergency, please dial 911 immediately.                [facility], [address], [place]  [facility] [place]  [address]  [facility] access [facility] [address]  [phone] [address]  [facility] [address] [place] [address]  [address] [facility] [address] [facility] [address]  [facility] [id number] [phone] [address]  [facility] [place]  [address]  [facility] [id number] [phone]

## 2024-04-16 ENCOUNTER — LAB ENCOUNTER (OUTPATIENT)
Dept: LAB | Age: 44
End: 2024-04-16
Attending: NURSE PRACTITIONER
Payer: COMMERCIAL

## 2024-04-16 DIAGNOSIS — Z13.228 SCREENING FOR METABOLIC DISORDER: ICD-10-CM

## 2024-04-16 DIAGNOSIS — Z13.220 SCREENING FOR LIPID DISORDERS: ICD-10-CM

## 2024-04-16 DIAGNOSIS — Z00.00 ROUTINE GENERAL MEDICAL EXAMINATION AT A HEALTH CARE FACILITY: ICD-10-CM

## 2024-04-16 DIAGNOSIS — Z13.29 SCREENING FOR THYROID DISORDER: ICD-10-CM

## 2024-04-16 DIAGNOSIS — Z13.0 SCREENING FOR DISORDER OF BLOOD AND BLOOD-FORMING ORGANS: ICD-10-CM

## 2024-04-16 LAB
ALBUMIN SERPL-MCNC: 4.5 G/DL (ref 3.2–4.8)
ALBUMIN/GLOB SERPL: 1.8 {RATIO} (ref 1–2)
ALP LIVER SERPL-CCNC: 73 U/L
ALT SERPL-CCNC: 12 U/L
ANION GAP SERPL CALC-SCNC: <0 MMOL/L (ref 0–18)
AST SERPL-CCNC: 19 U/L (ref ?–34)
BASOPHILS # BLD AUTO: 0.04 X10(3) UL (ref 0–0.2)
BASOPHILS NFR BLD AUTO: 0.8 %
BILIRUB SERPL-MCNC: 0.3 MG/DL (ref 0.3–1.2)
BUN BLD-MCNC: 10 MG/DL (ref 9–23)
BUN/CREAT SERPL: 9.4 (ref 10–20)
CALCIUM BLD-MCNC: 9.7 MG/DL (ref 8.7–10.4)
CHLORIDE SERPL-SCNC: 111 MMOL/L (ref 98–112)
CHOLEST SERPL-MCNC: 186 MG/DL (ref ?–200)
CO2 SERPL-SCNC: 30 MMOL/L (ref 21–32)
CREAT BLD-MCNC: 1.06 MG/DL
DEPRECATED RDW RBC AUTO: 43.1 FL (ref 35.1–46.3)
EGFRCR SERPLBLD CKD-EPI 2021: 66 ML/MIN/1.73M2 (ref 60–?)
EOSINOPHIL # BLD AUTO: 0.34 X10(3) UL (ref 0–0.7)
EOSINOPHIL NFR BLD AUTO: 6.8 %
ERYTHROCYTE [DISTWIDTH] IN BLOOD BY AUTOMATED COUNT: 13.1 % (ref 11–15)
FASTING PATIENT LIPID ANSWER: YES
FASTING STATUS PATIENT QL REPORTED: YES
GLOBULIN PLAS-MCNC: 2.5 G/DL (ref 2.8–4.4)
GLUCOSE BLD-MCNC: 77 MG/DL (ref 70–99)
HCT VFR BLD AUTO: 42.4 %
HDLC SERPL-MCNC: 58 MG/DL (ref 40–59)
HGB BLD-MCNC: 13.8 G/DL
IMM GRANULOCYTES # BLD AUTO: 0.01 X10(3) UL (ref 0–1)
IMM GRANULOCYTES NFR BLD: 0.2 %
LDLC SERPL CALC-MCNC: 119 MG/DL (ref ?–100)
LYMPHOCYTES # BLD AUTO: 1.67 X10(3) UL (ref 1–4)
LYMPHOCYTES NFR BLD AUTO: 33.3 %
MCH RBC QN AUTO: 29.4 PG (ref 26–34)
MCHC RBC AUTO-ENTMCNC: 32.5 G/DL (ref 31–37)
MCV RBC AUTO: 90.2 FL
MONOCYTES # BLD AUTO: 0.52 X10(3) UL (ref 0.1–1)
MONOCYTES NFR BLD AUTO: 10.4 %
NEUTROPHILS # BLD AUTO: 2.43 X10 (3) UL (ref 1.5–7.7)
NEUTROPHILS # BLD AUTO: 2.43 X10(3) UL (ref 1.5–7.7)
NEUTROPHILS NFR BLD AUTO: 48.5 %
NONHDLC SERPL-MCNC: 128 MG/DL (ref ?–130)
OSMOLALITY SERPL CALC.SUM OF ELEC: 286 MOSM/KG (ref 275–295)
PLATELET # BLD AUTO: 293 10(3)UL (ref 150–450)
POTASSIUM SERPL-SCNC: 4.2 MMOL/L (ref 3.5–5.1)
PROT SERPL-MCNC: 7 G/DL (ref 5.7–8.2)
RBC # BLD AUTO: 4.7 X10(6)UL
SODIUM SERPL-SCNC: 139 MMOL/L (ref 136–145)
TRIGL SERPL-MCNC: 44 MG/DL (ref 30–149)
TSI SER-ACNC: 1.06 MIU/ML (ref 0.55–4.78)
VLDLC SERPL CALC-MCNC: 8 MG/DL (ref 0–30)
WBC # BLD AUTO: 5 X10(3) UL (ref 4–11)

## 2024-04-16 PROCEDURE — 84443 ASSAY THYROID STIM HORMONE: CPT

## 2024-04-16 PROCEDURE — 80061 LIPID PANEL: CPT

## 2024-04-16 PROCEDURE — 36415 COLL VENOUS BLD VENIPUNCTURE: CPT

## 2024-04-16 PROCEDURE — 80053 COMPREHEN METABOLIC PANEL: CPT

## 2024-04-16 PROCEDURE — 85025 COMPLETE CBC W/AUTO DIFF WBC: CPT

## 2024-04-17 ENCOUNTER — PATIENT MESSAGE (OUTPATIENT)
Dept: INTERNAL MEDICINE CLINIC | Facility: CLINIC | Age: 44
End: 2024-04-17

## 2024-04-17 DIAGNOSIS — R63.2 BINGE EATING: ICD-10-CM

## 2024-04-17 DIAGNOSIS — Z51.81 ENCOUNTER FOR THERAPEUTIC DRUG MONITORING: ICD-10-CM

## 2024-04-17 NOTE — TELEPHONE ENCOUNTER
Vyvanse 40 mg    Last time medication was refilled 3/10/24  Quantity and number of refills 30 w/ 0  Last OV 1/8/24  Next OV 4/30/24

## 2024-04-17 NOTE — TELEPHONE ENCOUNTER
From: Arleth Denis  To: Wendy Whiting  Sent: 4/17/2024 10:49 AM CDT  Subject: Vyvanse    Hello,    Can I please get a refill on my Vyvanse? I’m out of refills and I have a video appt on 4/30. You can send to [facility]. Thank you!  Arleth

## 2024-04-18 ENCOUNTER — TELEPHONE (OUTPATIENT)
Dept: INTERNAL MEDICINE CLINIC | Facility: CLINIC | Age: 44
End: 2024-04-18

## 2024-04-18 NOTE — TELEPHONE ENCOUNTER
Fax from [facility] to refill Bupropion    Requesting Bupropion  mg  LOV: 1/8/24  RTC: 3 months  Last Relevant Labs: na  Filled: 1/8/24 #90 with 1 refills    Future Appointments   Date Time Provider Department Center   4/23/2024 10:30 AM Linda Koch APRN [facility]   4/30/2024  2:00 PM Wendy Whiting APRN [facility]     6 month order sent 1/8/24 locally

## 2024-04-20 RX ORDER — LISDEXAMFETAMINE DIMESYLATE CAPSULES 40 MG/1
40 CAPSULE ORAL DAILY
Qty: 30 CAPSULE | Refills: 0 | Status: SHIPPED | OUTPATIENT
Start: 2024-04-20 | End: 2024-05-20

## 2024-04-23 ENCOUNTER — OFFICE VISIT (OUTPATIENT)
Dept: INTERNAL MEDICINE CLINIC | Facility: CLINIC | Age: 44
End: 2024-04-23
Payer: COMMERCIAL

## 2024-04-23 VITALS
HEART RATE: 106 BPM | SYSTOLIC BLOOD PRESSURE: 110 MMHG | DIASTOLIC BLOOD PRESSURE: 68 MMHG | TEMPERATURE: 98 F | BODY MASS INDEX: 30 KG/M2 | OXYGEN SATURATION: 97 % | WEIGHT: 173.19 LBS

## 2024-04-23 DIAGNOSIS — G43.809 OTHER MIGRAINE WITHOUT STATUS MIGRAINOSUS, NOT INTRACTABLE: ICD-10-CM

## 2024-04-23 DIAGNOSIS — E78.00 HYPERCHOLESTEROLEMIA: ICD-10-CM

## 2024-04-23 DIAGNOSIS — J45.20 MILD INTERMITTENT ASTHMA WITHOUT COMPLICATION (HCC): ICD-10-CM

## 2024-04-23 DIAGNOSIS — Z12.11 SCREEN FOR COLON CANCER: ICD-10-CM

## 2024-04-23 DIAGNOSIS — Z00.00 ROUTINE GENERAL MEDICAL EXAMINATION AT A HEALTH CARE FACILITY: Primary | ICD-10-CM

## 2024-04-23 DIAGNOSIS — E53.8 B12 DEFICIENCY: ICD-10-CM

## 2024-04-23 DIAGNOSIS — F41.9 ANXIETY: ICD-10-CM

## 2024-04-23 PROBLEM — F43.9 STRESS: Status: RESOLVED | Noted: 2020-03-31 | Resolved: 2024-04-23

## 2024-04-23 PROCEDURE — 3074F SYST BP LT 130 MM HG: CPT | Performed by: NURSE PRACTITIONER

## 2024-04-23 PROCEDURE — 3078F DIAST BP <80 MM HG: CPT | Performed by: NURSE PRACTITIONER

## 2024-04-23 PROCEDURE — 96127 BRIEF EMOTIONAL/BEHAV ASSMT: CPT | Performed by: NURSE PRACTITIONER

## 2024-04-23 PROCEDURE — 99396 PREV VISIT EST AGE 40-64: CPT | Performed by: NURSE PRACTITIONER

## 2024-04-23 RX ORDER — MONTELUKAST SODIUM 10 MG/1
10 TABLET ORAL DAILY
Qty: 90 TABLET | Refills: 3 | Status: SHIPPED | OUTPATIENT
Start: 2024-04-23

## 2024-04-23 RX ORDER — FLUTICASONE PROPIONATE AND SALMETEROL 250; 50 UG/1; UG/1
1 POWDER RESPIRATORY (INHALATION) EVERY 12 HOURS SCHEDULED
Qty: 3 EACH | Refills: 1 | Status: SHIPPED | OUTPATIENT
Start: 2024-04-23

## 2024-04-23 RX ORDER — ALBUTEROL SULFATE 90 UG/1
2 AEROSOL, METERED RESPIRATORY (INHALATION) EVERY 6 HOURS PRN
Qty: 1 EACH | Refills: 0 | Status: SHIPPED | OUTPATIENT
Start: 2024-04-23

## 2024-04-23 RX ORDER — SUMATRIPTAN 50 MG/1
TABLET, FILM COATED ORAL
Qty: 27 TABLET | Refills: 1 | Status: SHIPPED | OUTPATIENT
Start: 2024-04-23

## 2024-04-23 NOTE — PROGRESS NOTES
Arleth Denis is a 43 year old female who presents for a complete physical exam:       Patient complains of:     Weight loss clinic.  Zepbound on back order.      Asthma/Allergies.  Score 19  Singulair.  She had URI in fall and since then she has struggled.  Using PRN Albuterol   will restart advair and wean if tolerable in a few months  Migraines. PRN imitrex.     Mild elevation of serum creatinine.  Follow     Dyslipidemia.  .     Wt Readings from Last 6 Encounters:   01/08/24 163 lb (73.9 kg)   09/26/23 164 lb (74.4 kg)   08/28/23 158 lb (71.7 kg)   04/26/23 138 lb (62.6 kg)   01/26/23 170 lb (77.1 kg)   10/31/22 170 lb 10.2 oz (77.4 kg)       Cholesterol, Total (mg/dL)   Date Value   04/16/2024 186   02/02/2023 166   08/22/2022 216 (H)     CHOLESTEROL, TOTAL (mg/dL)   Date Value   05/20/2015 175     HDL Cholesterol (mg/dL)   Date Value   04/16/2024 58   02/02/2023 52   08/22/2022 62 (H)     HDL CHOLESTEROL (mg/dL)   Date Value   05/20/2015 56     LDL Cholesterol (mg/dL)   Date Value   04/16/2024 119 (H)   02/02/2023 101 (H)   08/22/2022 141 (H)     LDL-CHOLESTEROL (mg/dL (calc))   Date Value   05/20/2015 104     AST (U/L)   Date Value   04/16/2024 19   02/02/2023 12 (L)   08/22/2022 17   05/20/2015 17   04/18/2014 45 (H)   04/14/2014 33   07/18/2011 21     ALT (U/L)   Date Value   04/16/2024 12   02/02/2023 20   08/22/2022 24   05/20/2015 15   04/18/2014 37   04/14/2014 27   07/18/2011 15        Current Outpatient Medications   Medication Sig Dispense Refill    lisdexamfetamine (VYVANSE) 40 MG Oral Cap Take 1 capsule (40 mg total) by mouth daily. 30 capsule 0    montelukast 10 MG Oral Tab Take 1 tablet (10 mg total) by mouth daily. 90 tablet 0    Tirzepatide-Weight Management (ZEPBOUND) 7.5 MG/0.5ML Subcutaneous Solution Auto-injector Inject 7.5 mg into the skin once a week. 6 mL 0    FLUoxetine HCl 40 MG Oral Cap Take 1 capsule (40 mg total) by mouth daily. 90 capsule 1    buPROPion  MG Oral  Tablet 24 Hr Take 1 tablet (300 mg total) by mouth every morning. 90 tablet 1    Loratadine 10 MG Oral Cap Take by mouth.      Vitamin D3, Cholecalciferol, (VITAMIN D3) 125 MCG (5000 UT) Oral Cap Take 1 capsule (5,000 Units total) by mouth daily.      SUMAtriptan Succinate (IMITREX) 50 MG Oral Tab Use at onset; repeat once after 2 HRS-ONLY 2 IN 24 HR MAX 27 tablet 1      Past Medical History:    Acute serous otitis media    Bell's palsy    Depression    PONV (postoperative nausea and vomiting)    Visual impairment    glasses      Past Surgical History:   Procedure Laterality Date          Cholecystectomy      Laparoscopic cholecystectomy  3/1/2006    Leep  2018    jarocho 3      Family History   Problem Relation Age of Onset    No Known Problems Mother     Hypertension Father     Lipids Father     Cancer Father     Pancreatic Cancer Father     Asthma Brother     Heart Disorder Maternal Grandfather     Cancer Paternal Grandmother     Diabetes Paternal Grandfather            Health Maintenance  Immunizations:   Immunization History   Administered Date(s) Administered    Covid-19 Vaccine Pfizer 30 mcg/0.3 ml 2020, 2021, 2021    Fluvirin, 3 Years & >, Im 10/08/2016    HEP B 10/14/2003, 2003, 2004    Influenza 10/08/2016, 2017, 2018, 2019, 2020, 2021    MMR 1992    Measles 1980    Rubella 1980    TDAP 2014     Dental Visits: yes   Colon cancer screening: discussed screening.    Health Maintenance   Topic Date Due    Colorectal Cancer Screening  Never done      Gyne:     Health Maintenance   Topic Date Due    Pap Smear  2026            History of dysplasia:  No  Menstrual Cycle: post        Breast Cancer Screening:    Health Maintenance   Topic Date Due    Mammogram  2025        Bone Density:  na   Osteoperosis Prevention:    Osteoperosis Prevention was discussed.  Reviewed Calcium, Vitamin D supplementation and Weight  Bearing Exercises.    Patient is performing weight bearing exercises.  Patient is currently taking Vitamin D supplementation.        REVIEW OF SYSTEMS:   GENERAL: feels well otherwise  EYES:denies blurred vision or double vision  HEENT: denies nasal congestion, sinus pain or ST  LUNGS: denies shortness of breath with exertion  CARDIOVASCULAR: denies chest pain on exertion  GI: denies abdominal pain,denies heartburn  : denies dysuria, vaginal discharge or itching  MUSCULOSKELETAL: denies back pain  NEURO: denies headaches  PSYCH: no c/o      EXAM:   There were no vitals taken for this visit.  There is no height or weight on file to calculate BMI.   GENERAL: well developed, well nourished,in no apparent distress  SKIN: no rashes,no suspicious lesions  HEENT: atraumatic, normocephalic,ears and throat are clear  EYES:PERRLA, EOMI, conjunctiva are clear  NECK: supple,no adenopathy,  LUNGS: clear to auscultation  CARDIO: RRR without murmur  GI: good BS's,no masses, HSM or tenderness  MUSCULOSKELETAL: back is not tender,  EXTREMITIES: no edema  NEURO: Oriented times three,cranial nerves are intact,motor and sensory are grossly intact    ASSESSMENT AND PLAN:      HEALTH MAINTENANCE:  labs reviewed  well woman with Dr Orozco.  Referral for screening colonoscopy.      Encounter Diagnoses   Name Primary?    Routine general medical examination at a health care facility Yes    Hypercholesterolemia stable   monitor.      Mild intermittent asthma without complication (HCC)  not well controlled   add advair for spring/summer.  Wean as tolerated.  PRN proair       B12 deficiency stable  cont supplement       Other migraine without status migrainosus, not intractable     Anxiety  wellbutrin and prozac  has been ordered by weight loss clinic.  Helping her underlyingGAD.         No orders of the defined types were placed in this encounter.      Meds & Refills for this Visit:  Requested Prescriptions      No prescriptions requested  or ordered in this encounter       Imaging & Consults:  None    No follow-ups on file.  There are no Patient Instructions on file for this visit.

## 2024-05-02 ENCOUNTER — OFFICE VISIT (OUTPATIENT)
Dept: INTERNAL MEDICINE CLINIC | Facility: CLINIC | Age: 44
End: 2024-05-02
Payer: COMMERCIAL

## 2024-05-02 VITALS
WEIGHT: 175 LBS | SYSTOLIC BLOOD PRESSURE: 116 MMHG | DIASTOLIC BLOOD PRESSURE: 76 MMHG | HEIGHT: 64 IN | RESPIRATION RATE: 16 BRPM | BODY MASS INDEX: 29.88 KG/M2 | HEART RATE: 118 BPM

## 2024-05-02 DIAGNOSIS — E78.00 HYPERCHOLESTEROLEMIA: ICD-10-CM

## 2024-05-02 DIAGNOSIS — R63.2 BINGE EATING: ICD-10-CM

## 2024-05-02 DIAGNOSIS — Z51.81 ENCOUNTER FOR THERAPEUTIC DRUG MONITORING: Primary | ICD-10-CM

## 2024-05-02 DIAGNOSIS — F41.9 ANXIETY: ICD-10-CM

## 2024-05-02 DIAGNOSIS — E66.9 CLASS 1 OBESITY WITH SERIOUS COMORBIDITY AND BODY MASS INDEX (BMI) OF 30.0 TO 30.9 IN ADULT, UNSPECIFIED OBESITY TYPE: ICD-10-CM

## 2024-05-02 PROCEDURE — 3008F BODY MASS INDEX DOCD: CPT | Performed by: NURSE PRACTITIONER

## 2024-05-02 PROCEDURE — 3074F SYST BP LT 130 MM HG: CPT | Performed by: NURSE PRACTITIONER

## 2024-05-02 PROCEDURE — 3078F DIAST BP <80 MM HG: CPT | Performed by: NURSE PRACTITIONER

## 2024-05-02 PROCEDURE — 99213 OFFICE O/P EST LOW 20 MIN: CPT | Performed by: NURSE PRACTITIONER

## 2024-05-02 RX ORDER — SEMAGLUTIDE 0.5 MG/.5ML
0.5 INJECTION, SOLUTION SUBCUTANEOUS WEEKLY
Qty: 2 ML | Refills: 1 | Status: SHIPPED | OUTPATIENT
Start: 2024-05-02

## 2024-05-02 RX ORDER — LISDEXAMFETAMINE DIMESYLATE 50 MG/1
50 CAPSULE ORAL DAILY
Qty: 30 CAPSULE | Refills: 0 | Status: SHIPPED | OUTPATIENT
Start: 2024-06-02 | End: 2024-07-02

## 2024-05-02 RX ORDER — LISDEXAMFETAMINE DIMESYLATE 50 MG/1
50 CAPSULE ORAL DAILY
Qty: 30 CAPSULE | Refills: 0 | Status: SHIPPED | OUTPATIENT
Start: 2024-07-03 | End: 2024-08-02

## 2024-05-02 RX ORDER — LISDEXAMFETAMINE DIMESYLATE 50 MG/1
50 CAPSULE ORAL DAILY
Qty: 30 CAPSULE | Refills: 0 | Status: SHIPPED | OUTPATIENT
Start: 2024-05-02 | End: 2024-06-01

## 2024-05-02 NOTE — PATIENT INSTRUCTIONS
Continue making lifestyle changes that focus on good nutrition, regular exercise and stress management.    Medication Plan: Continue current medication regimen aside from remain off Zepbound and return to Wegovy as directed. Send [employer] message after 3rd pen on Wegovy 1 mg dose with current weight and status so I can increase if tolerated.    Tips while taking an injectable weight loss medication:    Be an intuitive eater. Listen to your hunger and fullness signals, stopping when you are full.  Consume protein and produce in your day, striving for a rainbow of color of produce.  Reduce portions to staring size of 1 cup size and check in with your gut to see if you are full. Set the timer to slow down your eating pace to allow for 15-20 minutes to complete a meal.  Reduce refined sugars and high fat foods, as they may contribute to greater side effects of nausea and heartburn.  Stop eating 3 hours before bedtime to allow your food to digest.  Remain hydrated with water or non caloric and non caffeine beverages.  Use over the counter quinn lozenge/supplement to help reduce nausea if needed.    Anticipate contact for counseling sessions. Begin to be your own self advocate, setting boundaries and learning to say no.    Though many people may be familiar with advocating for their child, an elderly parent or a cause they care deeply about, they may be less familiar with self-advocacy - or advocating for themselves.  Certainly, you've heard about self-care or spending time taking care of yourself.  Self-advocacy takes this one step further where you are more assertive in finding the right kind of support and being more specific when asking for help or telling people what you need to better manage your health.  It's important to understand that taking care of yourself or advocating for yourself does not mean you are being selfish.  Instead, it's a reminder that your health and well-being are worth the time and  effort.  Here are Some Ways you Can Use Self-Advocacy to Improve Your Health:  1 - With Family and Friends  Getting the right support can either make or break your weight loss program.  I often tell patients that their family and friends are not mind readers so if they need help from someone to better follow through with their healthy lifestyle program, they need to verbalize their requests. Here are some examples:  Can you watch the children on Tuesday afternoons so I can go to my workout class?  It's really hard for me to resist eating ice cream when you bring cartons into the house. Can we get single-serving frozen yogurt dessert options instead and make a date to enjoy ice cream out for special occasions?  I'm looking for a morning walking patrick to help me be more accountable. Will you join me?  Can we choose a different restaurant for lunch that has healthier options?  Would you consider splitting an entree with me and we can each get our own salads at the restaurant, since oversized portions are enough to feed a twosome?  Can you find a hotel for our trip that has a fitness center so I can continue my workouts?  Can I bring a favorite, healthy dish to your party?  2 - With Work Colleagues  As we are transitioning to going back into the office, the workplace presents other challenges to our health that we need to address. Anything you can do to better manage your stress at work or eating and inactivity temptations can be helpful. Here are some examples of self-advocacy workplace requests:  I'm completely overloaded and need to delegate some work tasks. Is there someone you think can help me?  I prefer to bring a healthier lunch from home instead of going out all the time. We can still socialize in the break room and then have more time to take a walk afterward. Will you join me?  Can we put together a group to look at having healthier snack foods in the vending machines?  Is there anything that can be done to  have better lighting in the stairwells so we feel more comfortable using the stairs?  3 - With Health Care Providers  As your  for your health, your primary care provider (PCP) can help you manage weight. But we know that PCP's don't always bring weight up during visits and sometimes they can make you feel shamed or even blamed for your excess weight. Self-advocacy works well here also. Here are some example questions you can ask:  I need more help managing my weight. Can you help me or refer me to someone?  Am I a candidate for a weight loss medication?  I find your stigmatizing comments like saying I'm non-compliant when I'm doing the best I can quite offensive. I would like to have a more collaborative relationship with you when it comes to my weight. Are you ok with that?  Having stronger self-advocacy skills will help you fight weight bias and find health care providers who can partner with you in you quest for better health.  Once you're feeling more comfortable advocating for yourself, you may be interested in joining the [employer], an organization that does a wonderful job advocating for those who are impacted by the disease of obesity.  Please visit their website to learn more and become a member.    RK  Óscar Garrison MD    This tip sheet has been taken from www.GENELINK    Reset your mind and continue to work on keeping a positive attitude towards your body and health! Make meditation a part of your day with CALM.  Start your day off on a positive thought with listening to the Daily Tobi.  Visit [employer]yeg[employer]ove to get the nicole and get started! Make mental health a priority in your day.    Strategies for Silencing Your Inner Critic and Boosting Self-Confidence  January 10, 2021  Posted in Blog, Motivation  By Your Weight Matters Campaign    Sometimes our inner critic can be our biggest enemy. While being tough on yourself is one way to set personal challenges and stay  motivated, that toughness can  the way of your growth if the voice in your head can't be turned off.  This is especially true when it comes to weight-loss because weight is often a taboo topic. Conversations about weight can be difficult to have with other people, no less yourself. Do you ever find your inner dialogue repeating any of these self-sabotaging statements?  “I'll never be able to reach my weight goals. It's too hard.”  “I blame myself for my weight and now I can't do anything about it.”  “I am 'less than' because of my weight.”  Challenging Your Inner Critic  Your inner critic isn't merely a personal motivator or an internal system of checks and balances. If left unchecked, the voice of your inner critic can prevent you from believing in yourself and reaching your goals. Sometimes our inner dialogue can even become a self-fulfilling prophecy if we buy into the lies and negativity.  Silencing your inner critic can be challenging, but you DO have power over your thoughts. Try these tips to rise above them and boost self-confidence.  1 - Imagine Saying to Other People What You Say to Yourself. It can help you realize the harshness of your inner critic and how untrue those words really are.  2 - Compliment and Encourage Others as a Habit. The more you make a habit out of lifting others up, the more likely you are to lift yourself up along the way, too.  3 - Start a Positive Affirmation Journal. Every day, write down positive things you recognize about yourself that you are grateful for. For example:  “I am hard-working and don't give up easily.”  “Today I got up and met the day's challenges, even though I had a difficult time starting.”  “I'm grateful for my patience and ambition.”  4 - Remind Yourself of Your Progress. Looking back on what you've already accomplished can help you believe that you will continue to find more success down the road.  5 - Balance Negative Thoughts with Productivity.  Every time your inner critic tries putting you down, do something you will be proud of later (i.e. a workout, a healthy hobby, spending time with loved ones or even running your day's errands).  6 - Talk it Out with Someone You Trust. Confide in a loved one or accountability partner who will help you recognize the lies that your inner critic is telling you.  7 - Look at the Bigger Picture. If you're struggling with something, make an effort to recognize other factors that might be getting in the way -- factors that have nothing to do with your aptitude or self-worth (i.e. a busy schedule, health limitations or stress).  Final Takeaway  Remember that your thoughts have powerful influence over your actions. When you make an effort to think positively and silence your inner critic, you will slowly cultivate self-compassion and self-confidence that will set you up for meeting your goals. Your inner dialogue can make the difference between being stagnant and pushing forward!

## 2024-05-02 NOTE — PROGRESS NOTES
Arleth Denis is a 43 year old female presents today for follow-up on medical weight loss program for the treatment of overweight, obesity, or morbid obesity.    Patient has lost -12# since LOV 4 months ago. She has been off Zepbound for about 5 weeks d/t supply. Had gotten up to 7.5 mg weekly and felt good control over food noise. Now having increase stress, mindless eating and emotional eating. Feeling overwhelmed with life. Has fair support of spouse. Triplets are 10 y/o, spouse works nights and patient reports some more time for self care but chooses to fill it with requests from others and then frustrated she has no time for self.    Replaced by [facility] Loss Follow Up    Question 5/1/2024  4:21 PM CDT - Filed by Patient   Please describe a success moment: None   Please describe a challenging moment/needs for improvement: Unable to get my medication. Gained weight.   Please complete this 24 hour food journal, listing everything you had to eat in the past day. Include the average time of day you ate these meals at    List foods, qty and prep for breakfast: None   List foods, qty and prep for lunch. Boneless chicken bites, pretzels, protein bat   List foods, qty and prep for dinner. Hamburger, baked potato   List foods, qty and prep for snacks. Ice cream [employer]   List the types and qty of fluids consumed Diet soda and water   On average, how many meals did you eat out per week? 3   Exercise    How many days per week are you active or exercise 0   On average, how many days were anaerobic (strength/resistance) exercises performed? 0   On average, how many days were aerobic (cardio) exercises performed? 0   Perceived level of exertion on a scale of 1-5, with 5 being very intense: 1   Stress    Average stress level on a scale of 1-10, with 10 being extremely stressed: 4   If greater than 5/1O how would you grade your coping mechanisms? moderate   Sleep hours and integrity    How many hours of uninterrupted sleep do you  get a night: 6   Do you feel rested in the morning: No   If no, what may have been disrupting your sleep? Hot/Thirsty   Please list any goal(s) for your next visit Help with coping with the frustration of lack of medication. 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 Marital status:  with teenager and set of triplets    PMH reviewed. Cardiac disorders: hx of PVCs, Hypothyroid: no, Mental illness: anxiety- controlled, Glaucoma: no, Kidney stones: no, Eating disorder: no, CAMRON: no, Migraines: yes-controlled, Seizures: no, Liver disease: no, Renal disease: no, Diabetes: no, Gastroparesis: no, Constipation: no, Joint pains: no, Cancer hx: no, FMH thyroid or pancreatic cancer: Dad - pancreatic cancer    REVIEW OF SYSTEMS:  GENERAL: feels well otherwise  LUNGS: denies shortness of breath with exertion  CARDIOVASCULAR: denies chest pain on exertion, denies palpitations or pedal edema  GI: denies abdominal pain.  No N/V/D/C  MUSCULOSKELETAL: no joint pain  NEURO: denies headaches or dizziness  PSYCH: denies change in behavior or mood, denies feeling sad or depressed. Patient verbalizes understanding.  Answers submitted by the patient for this visit:  Medical Weight Loss Follow Up (Submitted on 1/5/2024)  If greater than 5/1O how would you grade your coping mechanisms?: moderate      EXAM:  /76   Pulse 118   Resp 16   Ht 5' 4\" (1.626 m)   Wt 175 lb (79.4 kg)   BMI 30.04 kg/m²    GENERAL: well developed, well nourished, in no apparent distress, obese  EYES: conjunctiva pink, sclera non icteric  LUNGS: CTA in all fields, breathing non labored  CARDIO: RRR without murmur, normal S1 and S2 without clicks or gallops, no pedal edema.   GI: +BS  NEURO/MS: motor and sensory grossly intact  PSYCH: pleasant, cooperative, normal mood and affect    ASSESSMENT AND PLAN:  Encounter Diagnoses   Name Primary?    Encounter  for therapeutic drug monitoring Yes    Class 1 obesity with serious comorbidity and body mass index (BMI) of 30.0 to 30.9 in adult, unspecified obesity type     Hypercholesterolemia     Binge eating     Anxiety          No orders of the defined types were placed in this encounter.      Meds & Refills for this Visit:  Requested Prescriptions     Signed Prescriptions Disp Refills    semaglutide-weight management (WEGOVY) 0.5 MG/0.5ML Subcutaneous Solution Auto-injector 2 mL 1     Sig: Inject 0.5 mL (0.5 mg total) into the skin once a week.    semaglutide-weight management 1 MG/0.5ML Subcutaneous Solution Auto-injector 2 mL 0     Sig: Inject 0.5 mL (1 mg total) into the skin once a week. Start after completing 4 weeks on .5 mg weekly dose    lisdexamfetamine (VYVANSE) 50 MG Oral Cap 30 capsule 0     Sig: Take 1 capsule (50 mg total) by mouth daily.    lisdexamfetamine (VYVANSE) 50 MG Oral Cap 30 capsule 0     Sig: Take 1 capsule (50 mg total) by mouth daily.    lisdexamfetamine (VYVANSE) 50 MG Oral Cap 30 capsule 0     Sig: Take 1 capsule (50 mg total) by mouth daily.       Imaging & Consults:  OP REFERRAL TO [facility]      Plan:  Patient has lost -12# since LOV 4 months ago on Wellbutrin  mg daily, fluoxetine 40 mg daily, Vyvnase 40 mg daily with a total weight loss of 39# since initial consult on 11/10/2016 with initial weight of 184#. Weight loss goal: 145#. CPM aside from switch back to [facility] as directed from Zepbound d/t cost. Hx of Saxenda. No Topamax d/t SEs of paresthesia.  on self advocacy and silencing the inner critic. Refer for counseling. See patient instructions below for additional plans and patient counseling.        Patient Instructions   Continue making lifestyle changes that focus on good nutrition, regular exercise and stress management.    Medication Plan: Continue current medication regimen aside from remain off Zepbound and return to [facility] as directed. Send [employer] message after 3rd  pen on Wegovy 1 mg dose with current weight and status so I can increase if tolerated.    Tips while taking an injectable weight loss medication:    Be an intuitive eater. Listen to your hunger and fullness signals, stopping when you are full.  Consume protein and produce in your day, striving for a rainbow of color of produce.  Reduce portions to staring size of 1 cup size and check in with your gut to see if you are full. Set the timer to slow down your eating pace to allow for 15-20 minutes to complete a meal.  Reduce refined sugars and high fat foods, as they may contribute to greater side effects of nausea and heartburn.  Stop eating 3 hours before bedtime to allow your food to digest.  Remain hydrated with water or non caloric and non caffeine beverages.  Use over the counter quinn lozenge/supplement to help reduce nausea if needed.    Anticipate contact for counseling sessions. Begin to be your own self advocate, setting boundaries and learning to say no.    Though many people may be familiar with advocating for their child, an elderly parent or a cause they care deeply about, they may be less familiar with self-advocacy - or advocating for themselves.  Certainly, you've heard about self-care or spending time taking care of yourself.  Self-advocacy takes this one step further where you are more assertive in finding the right kind of support and being more specific when asking for help or telling people what you need to better manage your health.  It's important to understand that taking care of yourself or advocating for yourself does not mean you are being selfish.  Instead, it's a reminder that your health and well-being are worth the time and effort.  Here are Some Ways you Can Use Self-Advocacy to Improve Your Health:  1 - With Family and Friends  Getting the right support can either make or break your weight loss program.  I often tell patients that their family and friends are not mind readers so if  they need help from someone to better follow through with their healthy lifestyle program, they need to verbalize their requests. Here are some examples:  Can you watch the children on Tuesday afternoons so I can go to my workout class?  It's really hard for me to resist eating ice cream when you bring cartons into the house. Can we get single-serving frozen yogurt dessert options instead and make a date to enjoy ice cream out for special occasions?  I'm looking for a morning walking patrick to help me be more accountable. Will you join me?  Can we choose a different restaurant for lunch that has healthier options?  Would you consider splitting an entree with me and we can each get our own salads at the restaurant, since oversized portions are enough to feed a twosome?  Can you find a hotel for our trip that has a fitness center so I can continue my workouts?  Can I bring a favorite, healthy dish to your party?  2 - With Work Colleagues  As we are transitioning to going back into the office, the workplace presents other challenges to our health that we need to address. Anything you can do to better manage your stress at work or eating and inactivity temptations can be helpful. Here are some examples of self-advocacy workplace requests:  I'm completely overloaded and need to delegate some work tasks. Is there someone you think can help me?  I prefer to bring a healthier lunch from home instead of going out all the time. We can still socialize in the break room and then have more time to take a walk afterward. Will you join me?  Can we put together a group to look at having healthier snack foods in the vending machines?  Is there anything that can be done to have better lighting in the stairwells so we feel more comfortable using the stairs?  3 - With Health Care Providers  As your  for your health, your primary care provider (PCP) can help you manage weight. But we know that PCP's don't always bring weight up  during visits and sometimes they can make you feel shamed or even blamed for your excess weight. Self-advocacy works well here also. Here are some example questions you can ask:  I need more help managing my weight. Can you help me or refer me to someone?  Am I a candidate for a weight loss medication?  I find your stigmatizing comments like saying I'm non-compliant when I'm doing the best I can quite offensive. I would like to have a more collaborative relationship with you when it comes to my weight. Are you ok with that?  Having stronger self-advocacy skills will help you fight weight bias and find health care providers who can partner with you in you quest for better health.  Once you're feeling more comfortable advocating for yourself, you may be interested in joining the [employer], an organization that does a wonderful job advocating for those who are impacted by the disease of obesity.  Please visit their website to learn more and become a member.    ANJELICA Garrison MD    This tip sheet has been taken from www.[employer]    Reset your mind and continue to work on keeping a positive attitude towards your body and health! Make meditation a part of your day with CALM.  Start your day off on a positive thought with listening to the Daily Tobi.  Visit Www.[employer] to get the nicole and get started! Make mental health a priority in your day.    Strategies for Silencing Your Inner Critic and Boosting Self-Confidence  January 10, 2021  Posted in Blog, Motivation  By Your [employer]    Sometimes our inner critic can be our biggest enemy. While being tough on yourself is one way to set personal challenges and stay motivated, that toughness can  the way of your growth if the voice in your head can't be turned off.  This is especially true when it comes to weight-loss because weight is often a taboo topic. Conversations about weight can be difficult to have with other people,  no less yourself. Do you ever find your inner dialogue repeating any of these self-sabotaging statements?  “I'll never be able to reach my weight goals. It's too hard.”  “I blame myself for my weight and now I can't do anything about it.”  “I am 'less than' because of my weight.”  Challenging Your Inner Critic  Your inner critic isn't merely a personal motivator or an internal system of checks and balances. If left unchecked, the voice of your inner critic can prevent you from believing in yourself and reaching your goals. Sometimes our inner dialogue can even become a self-fulfilling prophecy if we buy into the lies and negativity.  Silencing your inner critic can be challenging, but you DO have power over your thoughts. Try these tips to rise above them and boost self-confidence.  1 - Imagine Saying to Other People What You Say to Yourself. It can help you realize the harshness of your inner critic and how untrue those words really are.  2 - Compliment and Encourage Others as a Habit. The more you make a habit out of lifting others up, the more likely you are to lift yourself up along the way, too.  3 - Start a Positive Affirmation Journal. Every day, write down positive things you recognize about yourself that you are grateful for. For example:  “I am hard-working and don't give up easily.”  “Today I got up and met the day's challenges, even though I had a difficult time starting.”  “I'm grateful for my patience and ambition.”  4 - Remind Yourself of Your Progress. Looking back on what you've already accomplished can help you believe that you will continue to find more success down the road.  5 - Balance Negative Thoughts with Productivity. Every time your inner critic tries putting you down, do something you will be proud of later (i.e. a workout, a healthy hobby, spending time with loved ones or even running your day's errands).  6 - Talk it Out with Someone You Trust. Confide in a loved one or accountability  partner who will help you recognize the lies that your inner critic is telling you.  7 - Look at the Bigger Picture. If you're struggling with something, make an effort to recognize other factors that might be getting in the way -- factors that have nothing to do with your aptitude or self-worth (i.e. a busy schedule, health limitations or stress).  Final Takeaway  Remember that your thoughts have powerful influence over your actions. When you make an effort to think positively and silence your inner critic, you will slowly cultivate self-compassion and self-confidence that will set you up for meeting your goals. Your inner dialogue can make the difference between being stagnant and pushing forward!      Medication use and SEs reviewed with patient.    Return in about 3 months (around 8/2/2024) for weight management via clinic or VV.    DOCUMENTATION OF TIME SPENT: Code selection for this visit was based on time spent : 30 minutes on date of service in preparing to see the patient, obtaining and/or reviewing separately obtained history, performing a medically appropriate examination, counseling and educating the patient/family/caregiver, ordering medications or testing, referring and communicating with other healthcare providers, documenting clinical information in the electronic medical record, independently interpreting results and communicating results to the patient/family/caregiver and care coordination with the patient's other providers.        Answers submitted by the patient for this visit:  Medical Weight Loss Follow Up (Submitted on 5/1/2024)  If greater than 5/1O how would you grade your coping mechanisms?: moderate

## 2024-05-04 ENCOUNTER — TELEPHONE (OUTPATIENT)
Dept: INTERNAL MEDICINE CLINIC | Facility: CLINIC | Age: 44
End: 2024-05-04

## 2024-06-19 ENCOUNTER — PATIENT MESSAGE (OUTPATIENT)
Dept: INTERNAL MEDICINE CLINIC | Facility: CLINIC | Age: 44
End: 2024-06-19

## 2024-06-19 NOTE — TELEPHONE ENCOUNTER
From: Arleth Denis  To: Wedny Whiting  Sent: 6/19/2024 12:57 PM CDT  Subject: Wegovy refill    Hi Wendy!    So I just took my last dose of the 1 mg of Wegovy. I weighed myself this morning and I’m 168! You suggested a follow up in August which I will schedule. Until then I will need refills on the Wegovy. Can we go up in dose or should I see you first? I have made a lot of changes and have been doing well. No side effects. Please advise.     Thank you!  Arleth

## 2024-06-19 NOTE — TELEPHONE ENCOUNTER
Requesting wegovy  LOV: 5/2/24  RTC: 3 months  Last Relevant Labs: na  Filled: 5/2/24 #2ml with 0 refills  1 mg wegovy    Future Appointments   Date Time Provider Department Center   9/17/2024 10:30 AM Mey Saenz,  [facility]   10/1/2024  8:00 AM Wendy Whiting APRN [facility]     Noted at visit       Medication Plan: Continue current medication regimen aside from remain off Zepbound and return to Wegovy as directed. Send [employer] message after 3rd pen on Wegovy 1 mg dose with current weight and status so I can increase if tolerated.

## 2024-08-27 ENCOUNTER — TELEMEDICINE (OUTPATIENT)
Dept: INTERNAL MEDICINE CLINIC | Facility: CLINIC | Age: 44
End: 2024-08-27
Payer: COMMERCIAL

## 2024-08-27 DIAGNOSIS — E78.00 HYPERCHOLESTEROLEMIA: ICD-10-CM

## 2024-08-27 DIAGNOSIS — F43.9 STRESS: ICD-10-CM

## 2024-08-27 DIAGNOSIS — E66.9 CLASS 1 OBESITY WITH SERIOUS COMORBIDITY AND BODY MASS INDEX (BMI) OF 30.0 TO 30.9 IN ADULT, UNSPECIFIED OBESITY TYPE: ICD-10-CM

## 2024-08-27 DIAGNOSIS — F41.9 ANXIETY: ICD-10-CM

## 2024-08-27 DIAGNOSIS — Z51.81 ENCOUNTER FOR THERAPEUTIC DRUG MONITORING: Primary | ICD-10-CM

## 2024-08-27 DIAGNOSIS — R63.2 BINGE EATING: ICD-10-CM

## 2024-08-27 RX ORDER — FLUOXETINE 40 MG/1
40 CAPSULE ORAL DAILY
Qty: 90 CAPSULE | Refills: 1 | Status: SHIPPED | OUTPATIENT
Start: 2024-08-27

## 2024-08-27 RX ORDER — SEMAGLUTIDE 2.4 MG/.75ML
2.4 INJECTION, SOLUTION SUBCUTANEOUS WEEKLY
Qty: 9 ML | Refills: 1 | Status: SHIPPED | OUTPATIENT
Start: 2024-08-27

## 2024-08-27 RX ORDER — BUPROPION HYDROCHLORIDE 300 MG/1
300 TABLET ORAL EVERY MORNING
Qty: 90 TABLET | Refills: 1 | Status: SHIPPED | OUTPATIENT
Start: 2024-08-27

## 2024-08-27 NOTE — PATIENT INSTRUCTIONS
Continue making lifestyle changes that focus on good nutrition, regular exercise and stress management.    Medication Plan: Increase Wegovy to 2.4 mg weekly    Tips while taking an injectable weight loss medication:    Be an intuitive eater. Listen to your hunger and fullness signals, stopping when you are full.  Consume protein and produce in your day, striving for a rainbow of color of produce.  Reduce portions to staring size of 1 cup size and check in with your gut to see if you are full. Set the timer to slow down your eating pace to allow for 15-20 minutes to complete a meal.  Reduce refined sugars and high fat foods, as they may contribute to greater side effects of nausea and heartburn.  Stop eating 3 hours before bedtime to allow your food to digest.  Remain hydrated with water or non caloric and non caffeine beverages.  Use over the counter quinn lozenge/supplement to help reduce nausea if needed.    Anticipate contact for counseling sessions. Great work in being your own self advocate and being intentional, aware and using mindful eating practices to support your health goals! YOU got this!!

## 2024-08-27 NOTE — PROGRESS NOTES
[facility] Weight Management follow up via video visit:    Subjective    This visit is conducted using Telemedicine with live, interactive video and audio.    Chief Complaint:  Routine follow up visit for lifestyle and medical management for overweight, obesity, or morbid obesity. LOV in clinic weight: 175#.    PMH reviewed. Bariatric surgery planned or hx of surgery in the past: no.    HPI:   Arleth Denis is a 44 year old female who is being followed up today for lifestyle and medical management as deemed appropriate for overweight, obesity or morbid obesity. Patient reports weight loss monitoring at home via scale with a weight of 164#. This appears to be a weight loss since LOV 3 months ago in clinic. Patient has been off Wegovy for about 2 weeks ago d/t supply.    Questions/Concerns/Comments since LOV: Although off Wegovy she feels she has a good handle on her binge eating. Was contemplating going back on Vyvanse during this time of supply challenges with Wegovy. Stress is less with greater support from spouse and kids more independent with age. Using mindful eating practices.    Lifestyle/Social Hx Reviewed:    [facility] Medical Weight Loss Follow Up    Question 8/27/2024  8:11 AM CDT - Filed by Patient   Please describe a success moment: Losing 10 pounds   Please describe a challenging moment/needs for improvement: Meds on back order making appetite increase   Please complete this 24 hour food journal, listing everything you had to eat in the past day. Include the average time of day you ate these meals at    List foods, qty and prep for breakfast: Nothing   List foods, qty and prep for lunch. Small ham and cheese croissant   List foods, qty and prep for dinner. Meatloaf, mashrd potatoes, corn, 2 Hawaiian rolls   List foods, qty and prep for snacks. Handful of tortilla chips   List the types and qty of fluids consumed Small iced coffee, 3 cans diet soda, 1 bottle of water   On average, how many meals did you  eat out per week? 2   Exercise    How many days per week are you active or exercise 2   On average, how many days were anaerobic (strength/resistance) exercises performed? 0   On average, how many days were aerobic (cardio) exercises performed? 2   Perceived level of exertion on a scale of 1-5, with 5 being very intense: 2   Stress    Average stress level on a scale of 1-10, with 10 being extremely stressed: 3   If greater than 5/1O how would you grade your coping mechanisms? moderate   Sleep hours and integrity    How many hours of uninterrupted sleep do you get a night: 6   Do you feel rested in the morning: No   If no, what may have been disrupting your sleep? Dry mouth, feeling hot   Please list any goal(s) for your next visit Lose 10 more pounds       ROS  General: feeling well, denies fatigue  CV: denies cp, palpitations  Resp: denies sob  GI: denies abdominal pain. Denies N/V/D/C  Neuro: denies paresthesia or cognitive changes  Psych: denies any mood changes    Physical Exam:  >   BP Readings from Last 3 Encounters:   05/02/24 116/76   04/23/24 110/68   01/08/24 104/70       Home weight: see above  Home BP: not available  Home blood sugars: n/a  Today's calculated BMI from reported weight: 28.2    General: patient speaking in full sentences, no increased work of breathing. alert, appears stated age, cooperative, and no distress  HENT: normocephalic  Resp: Breathing is non labored  Psych: patient appears cheerful, smiling, making good eye contact    Diagnoses and all orders for this visit:    Encounter for therapeutic drug monitoring  -     semaglutide-weight management (WEGOVY) 2.4 MG/0.75ML Subcutaneous Solution Auto-injector; Inject 0.75 mL (2.4 mg total) into the skin once a week.  -     buPROPion  MG Oral Tablet 24 Hr; Take 1 tablet (300 mg total) by mouth every morning.  -     FLUoxetine HCl 40 MG Oral Cap; Take 1 capsule (40 mg total) by mouth daily.    Class 1 obesity with serious comorbidity and  body mass index (BMI) of 30.0 to 30.9 in adult, unspecified obesity type  -     semaglutide-weight management (WEGOVY) 2.4 MG/0.75ML Subcutaneous Solution Auto-injector; Inject 0.75 mL (2.4 mg total) into the skin once a week.  -     buPROPion  MG Oral Tablet 24 Hr; Take 1 tablet (300 mg total) by mouth every morning.  -     FLUoxetine HCl 40 MG Oral Cap; Take 1 capsule (40 mg total) by mouth daily.    Hypercholesterolemia  -     semaglutide-weight management (WEGOVY) 2.4 MG/0.75ML Subcutaneous Solution Auto-injector; Inject 0.75 mL (2.4 mg total) into the skin once a week.    Binge eating    Stress  -     buPROPion  MG Oral Tablet 24 Hr; Take 1 tablet (300 mg total) by mouth every morning.  -     FLUoxetine HCl 40 MG Oral Cap; Take 1 capsule (40 mg total) by mouth daily.    Anxiety  -     buPROPion  MG Oral Tablet 24 Hr; Take 1 tablet (300 mg total) by mouth every morning.  -     FLUoxetine HCl 40 MG Oral Cap; Take 1 capsule (40 mg total) by mouth daily.        Patient Instructions   Continue making lifestyle changes that focus on good nutrition, regular exercise and stress management.    Medication Plan: Increase Wegovy to 2.4 mg weekly    Tips while taking an injectable weight loss medication:    Be an intuitive eater. Listen to your hunger and fullness signals, stopping when you are full.  Consume protein and produce in your day, striving for a rainbow of color of produce.  Reduce portions to staring size of 1 cup size and check in with your gut to see if you are full. Set the timer to slow down your eating pace to allow for 15-20 minutes to complete a meal.  Reduce refined sugars and high fat foods, as they may contribute to greater side effects of nausea and heartburn.  Stop eating 3 hours before bedtime to allow your food to digest.  Remain hydrated with water or non caloric and non caffeine beverages.  Use over the counter quinn lozenge/supplement to help reduce nausea if  needed.    Anticipate contact for counseling sessions. Great work in being your own self advocate and being intentional, aware and using mindful eating practices to support your health goals! YOU got this!!    Return in about 5 months (around 1/27/2025) for weight management via clinic only.    DOCUMENTATION OF TIME SPENT: Code selection for this visit was based on time spent : 20 minutes on date of service in preparing to see the patient, obtaining and/or reviewing separately obtained history, performing a medically appropriate examination, counseling and educating the patient/family/caregiver, ordering medications or testing, referring and communicating with other healthcare providers, documenting clinical information in the electronic medical record, independently interpreting results and communicating results to the patient/family/caregiver and care coordination with the patient's other providers.    Answers submitted by the patient for this visit:  Medical Weight Loss Follow Up (Submitted on 8/27/2024)  If greater than 5/1O how would you grade your coping mechanisms?: moderate

## 2024-10-17 ENCOUNTER — TELEPHONE (OUTPATIENT)
Facility: CLINIC | Age: 44
End: 2024-10-17

## 2024-11-11 ENCOUNTER — TELEPHONE (OUTPATIENT)
Facility: CLINIC | Age: 44
End: 2024-11-11

## 2024-11-15 NOTE — PROGRESS NOTES
Dilcia Barrera is a 42year old female presents today for 11 month follow-up on medical weight loss program for the treatment of overweight, obesity, or morbid obesity associated elevated leptin.  Previous patient of Dr. Rangel Morley and [facility] client since 11/10/20 without murmur, normal S1 and S2 without clicks or gallops, no pedal edema.    GI: rotund, soft, +BS, no masses, HSM or tenderness  NEURO/MS: motor and sensory grossly intact  PSYCH: pleasant, cooperative, normal mood and affect    ASSESSMENT AND PLAN:  Enc [General Appearance - Alert] : alert romantic rejection or eating a hamburger and fries in front of your computer as you furiously try to make a work deadline? Perhaps you’re a busy mom, eating cookies in your car as you shuttle the kids back and forth to a slew of activities.  Or you’re a sma [General Appearance - Well-Appearing] : healthy appearing reach for that plate of cookies anyway. Belly Fat  In the days when our ancestors were fighting off tigers and famine, their bodies adapted by learning to store fat supplies for the long haul.  The unfortunate result for you and me is that when we are  [Oropharynx] : the oropharynx was normal with no thrush around worrying thoughts in your head, not even focusing on the taste of the food, how much you’ve eaten, or when you are feeling full. When you eat mindlessly, you will likely eat more, yet feel less satisfied.   Cravings and Fast Food  When we are chronic [Auscultation Breath Sounds / Voice Sounds] : lungs were clear to auscultation bilaterally fatigue. If you drink coffee or caffeinated soft drinks to stay awake, or alcohol to feel better, your sleep cycle will be even more disrupted. Sleep is also a powerful factor influencing weight gain or loss.  Lack of sleep may disrupt the functioning of gh [Abdomen Soft] : soft clearly, and improves your mood, so you are less likely to overeat.   Write in a Journal  Writing down your experiences and reactions or your most important goals keeps your hands busy and your mind occupied, so you’re less likely to snack on unhealthy food [Abdomen Tenderness] : non-tender [] : no rash [Oriented To Time, Place, And Person] : oriented to person, place, and time [FreeTextEntry1] : Interval removal of LUE PICC

## 2024-12-09 ENCOUNTER — TELEPHONE (OUTPATIENT)
Dept: INTERNAL MEDICINE CLINIC | Facility: CLINIC | Age: 44
End: 2024-12-09

## 2024-12-09 NOTE — TELEPHONE ENCOUNTER
Information regarding your request  This request has been approved using information available on the patient's profile. CaseId:[id number];Status:Approved;Review Type:Prior Auth;Coverage Start Date:11/09/2024;Coverage End Date:12/09/2025;

## 2024-12-20 DIAGNOSIS — Z51.81 ENCOUNTER FOR THERAPEUTIC DRUG MONITORING: ICD-10-CM

## 2024-12-20 DIAGNOSIS — E78.00 HYPERCHOLESTEROLEMIA: ICD-10-CM

## 2024-12-20 DIAGNOSIS — E66.811 CLASS 1 OBESITY WITH SERIOUS COMORBIDITY AND BODY MASS INDEX (BMI) OF 30.0 TO 30.9 IN ADULT, UNSPECIFIED OBESITY TYPE: ICD-10-CM

## 2024-12-23 NOTE — TELEPHONE ENCOUNTER
Patient message    I tried to refill this at my pharmacy wiry the refills I had left and they said it needs a prior auth now.     Requesting wegovy 2.4  LOV: 5/2/24  RTC: 3 months  Filled: 8/27/24 #9ml with 1 refills    Future Appointments   Date Time Provider Department Center   1/7/2025 12:30 PM Cuauhtemoc Garcia MD [facility] [place]   2/6/2025  9:20 AM Wendy Whiting APRN [facility]     Has not been seen in greater than 3 months. Continue with refill/PA?

## 2024-12-24 RX ORDER — SEMAGLUTIDE 2.4 MG/.75ML
2.4 INJECTION, SOLUTION SUBCUTANEOUS WEEKLY
Qty: 9 ML | Refills: 0 | Status: SHIPPED | OUTPATIENT
Start: 2024-12-24

## 2024-12-30 ENCOUNTER — TELEPHONE (OUTPATIENT)
Dept: INTERNAL MEDICINE CLINIC | Facility: CLINIC | Age: 44
End: 2024-12-30

## 2025-02-06 ENCOUNTER — OFFICE VISIT (OUTPATIENT)
Dept: INTERNAL MEDICINE CLINIC | Facility: CLINIC | Age: 45
End: 2025-02-06
Payer: COMMERCIAL

## 2025-02-06 VITALS
WEIGHT: 175 LBS | HEIGHT: 64 IN | DIASTOLIC BLOOD PRESSURE: 74 MMHG | BODY MASS INDEX: 29.88 KG/M2 | OXYGEN SATURATION: 99 % | HEART RATE: 90 BPM | RESPIRATION RATE: 16 BRPM | SYSTOLIC BLOOD PRESSURE: 110 MMHG

## 2025-02-06 DIAGNOSIS — E66.811 CLASS 1 OBESITY WITH SERIOUS COMORBIDITY AND BODY MASS INDEX (BMI) OF 30.0 TO 30.9 IN ADULT, UNSPECIFIED OBESITY TYPE: ICD-10-CM

## 2025-02-06 DIAGNOSIS — F41.9 ANXIETY: ICD-10-CM

## 2025-02-06 DIAGNOSIS — Z51.81 ENCOUNTER FOR THERAPEUTIC DRUG MONITORING: Primary | ICD-10-CM

## 2025-02-06 DIAGNOSIS — E78.00 HYPERCHOLESTEROLEMIA: ICD-10-CM

## 2025-02-06 PROCEDURE — 3008F BODY MASS INDEX DOCD: CPT | Performed by: NURSE PRACTITIONER

## 2025-02-06 PROCEDURE — 3078F DIAST BP <80 MM HG: CPT | Performed by: NURSE PRACTITIONER

## 2025-02-06 PROCEDURE — 99213 OFFICE O/P EST LOW 20 MIN: CPT | Performed by: NURSE PRACTITIONER

## 2025-02-06 PROCEDURE — 3074F SYST BP LT 130 MM HG: CPT | Performed by: NURSE PRACTITIONER

## 2025-02-06 RX ORDER — TIRZEPATIDE 5 MG/.5ML
5 INJECTION, SOLUTION SUBCUTANEOUS WEEKLY
Qty: 2 ML | Refills: 3 | Status: SHIPPED | OUTPATIENT
Start: 2025-02-06

## 2025-02-06 RX ORDER — TIRZEPATIDE 2.5 MG/.5ML
2.5 INJECTION, SOLUTION SUBCUTANEOUS WEEKLY
Qty: 2 ML | Refills: 0 | Status: SHIPPED | OUTPATIENT
Start: 2025-02-06

## 2025-02-06 RX ORDER — BUPROPION HYDROCHLORIDE 300 MG/1
300 TABLET ORAL EVERY MORNING
Qty: 90 TABLET | Refills: 1 | Status: SHIPPED | OUTPATIENT
Start: 2025-02-06

## 2025-02-06 RX ORDER — FLUOXETINE HYDROCHLORIDE 40 MG/1
40 CAPSULE ORAL DAILY
Qty: 90 CAPSULE | Refills: 1 | Status: SHIPPED | OUTPATIENT
Start: 2025-02-06

## 2025-02-06 NOTE — PROGRESS NOTES
Arleth Denis is a 44 year old female presents today for follow-up on medical weight loss program for the treatment of overweight, obesity, or morbid obesity.    Patient has lost 0# since LOV 5 months ago via VV and in clinic in 5/2024. She has been off Wegovy for over 5 weeks d/t PA and not covering 2.4 mg weekly dose. Starting to have return of food noise, but no binge eating reported. Consistent with Bupropion XL and Fluoxetine.     Former[facility] Medical Weight Loss Follow Up    Question 1/31/2025  8:09 AM CST - Filed by Patient   Please describe a success moment: Not snacking at night, not eating out as much   Please describe a challenging moment/needs for improvement: No energy or desire to workout   Please complete this 24 hour food journal, listing everything you had to eat in the past day. Include the average time of day you ate these meals at    List foods, qty and prep for breakfast: None   List foods, qty and prep for lunch. Cucumber salad, bruschetta chicken   List foods, qty and prep for dinner. Pot roast, one cup of mashed potatoes and light gravy   List foods, qty and prep for snacks. Pickle chips, skinny pop   List the types and qty of fluids consumed 2Diet soda, 2 bittles water   On average, how many meals did you eat out per week? 1   Exercise    How many days per week are you active or exercise 0   On average, how many days were anaerobic (strength/resistance) exercises performed? 0   On average, how many days were aerobic (cardio) exercises performed? 0   Perceived level of exertion on a scale of 1-5, with 5 being very intense:    Stress    Average stress level on a scale of 1-10, with 10 being extremely stressed: 4   If greater than 5/1O how would you grade your coping mechanisms?    Sleep hours and integrity    How many hours of uninterrupted sleep do you get a night: 6   Do you feel rested in the morning: No   If no, what may have been disrupting your sleep? 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 Marital status:  with teenager and set of triplets    PMH reviewed. Cardiac disorders: hx of PVCs, Hypothyroid: no, Mental illness: anxiety- controlled, Glaucoma: no, Kidney stones: no, Eating disorder: no, CAMRON: no, Migraines: yes-controlled, Seizures: no, Liver disease: no, Renal disease: no, Diabetes: no, Gastroparesis: no, Constipation: no, Joint pains: no, Cancer hx: no, FMH thyroid or pancreatic cancer: Dad - pancreatic cancer    REVIEW OF SYSTEMS:  GENERAL: feels well otherwise  LUNGS: denies shortness of breath with exertion  CARDIOVASCULAR: denies chest pain on exertion, denies palpitations or pedal edema  GI: denies abdominal pain.  No N/V/D/C  MUSCULOSKELETAL: no joint pain  NEURO: denies headaches or dizziness  PSYCH: denies change in behavior or mood, denies feeling sad or depressed. Patient verbalizes understanding.    EXAM:  /74   Pulse 90   Resp 16   Ht 5' 4\" (1.626 m)   Wt 175 lb (79.4 kg)   SpO2 99%   BMI 30.04 kg/m²    GENERAL: well developed, well nourished, in no apparent distress, obese  EYES: conjunctiva pink, sclera non icteric  LUNGS: CTA in all fields, breathing non labored  CARDIO: RRR without murmur, normal S1 and S2 without clicks or gallops, no pedal edema.   GI: +BS  NEURO/MS: motor and sensory grossly intact  PSYCH: pleasant, cooperative, normal mood and affect    ASSESSMENT AND PLAN:  Encounter Diagnoses   Name Primary?    Encounter for therapeutic drug monitoring Yes    Class 1 obesity with serious comorbidity and body mass index (BMI) of 30.0 to 30.9 in adult, unspecified obesity type     Anxiety     Hypercholesterolemia            No orders of the defined types were placed in this encounter.      Meds & Refills for this  Visit:  Requested Prescriptions     Signed Prescriptions Disp Refills    buPROPion  MG Oral Tablet 24 Hr 90 tablet 1     Sig: Take 1 tablet (300 mg total) by mouth every morning.    FLUoxetine HCl 40 MG Oral Cap 90 capsule 1     Sig: Take 1 capsule (40 mg total) by mouth daily.    Tirzepatide-Weight Management (ZEPBOUND) 2.5 MG/0.5ML Subcutaneous Solution Auto-injector 2 mL 0     Sig: Inject 2.5 mg into the skin once a week.    Tirzepatide-Weight Management (ZEPBOUND) 5 MG/0.5ML Subcutaneous Solution Auto-injector 2 mL 3     Sig: Inject 5 mg into the skin once a week. Start after completing full 4 weeks on 2.5 mg weekly dose.       Imaging & Consults:  None      Plan:  Patient has lost 0# since LOV in 5/2024 on Wellbutrin  mg daily, fluoxetine 40 mg daily with a total weight loss of 39# since initial consult on 11/10/2016 with initial weight of 184#. Weight loss goal: 145#. CPM aside from start Zepbound. Hx of Vyvanse, Wegovy, Saxenda. No Topamax d/t SEs of paresthesia.  on tracking nutrition, self monitoring, protein recommendations and the power of fitness. See patient instructions below for additional plans and patient counseling.        Patient Instructions   Continue making lifestyle changes that focus on good nutrition, regular exercise and stress management.    Medication Plan: Continue current medication regimen, remaining off Wegovy and switch to Zepbound as directed. Start Zepbound at 2.5 mg weekly. After 4 weeks increase to the next dose of 5 mg weekly. If at a weight plateau for >3 weeks, then send Overcart message with current scale weight to determine if a dose adjustment is appropriate. Otherwise plan to maintain this dose until next visit. Any further dose titrations beyond this dose will be considered at appointments only, unless otherwise discussed. Visit the website www.zepbound.MIG [employer] and click on Consumers for additional details, savings, and further dosing instructions.  This medication may require a prior authorization (PA) by your insurance. A PA may take one week plus to complete and our office will be in touch during this process if needed.     Tips while taking an injectable medication:    Be an intuitive eater. Listen to your hunger and fullness signals, stopping when you are full.  Consume protein and produce in your day, striving for a rainbow of color of produce.  Reduce portions to starting size of 1 cup and check in with your gut to see if you are full. Use a sand timer to slow down your eating pace to allow for 15-20 minutes to complete a meal and use the \"2 bite rule\".  Reduce refined sugars and high fat foods, as they may contribute to greater side effects of nausea and heartburn.  Stop eating 3 hours before bedtime to allow your food to digest.  Remain hydrated with water or non caloric and non caffeine beverages.  Use over the counter quinn lozenge/supplement to help reduce nausea if needed.  If you have been off your medication for more than 2 weeks please notify our office to determine next dosing, as a return to previous dose may not be appropriate or tolerated.    Next Steps: Start tracking nutrition with a focus on caloric reduction and protein.    Start and/or continue tracking nutrition to remain accountable for both quality and quantity of food. Recommend setting weekly weight loss goal to 1.5-2# and caution adding your exercise, as the nicole may overestimate your daily calories. Use a food scale, measuring cups and spoons. Purchase serving dishes that are 1/4 cup, 1/2 cup and 1 cup servings. Use an nicole such as Paperhater.com (www.Biotronics3D.com), CD[employer] Interactive SolutionsFitnessPal, or LoseIT! to provide accountably, structure and support. Consider a consult or follow up with one of our dieticians to help modify and/or support your nutrition plan for weight loss as well as maintenance.    I recommend self monitoring to support behavior change and to learn how your environment  individually impacts YOUR body. This will help promote intuitive eating practices. Goal is to track nutrition 2x/week via paper log or using an nicole such as [employer] (www.Adocu.com.com) or LoseIT!. I also advise checking and logging a weekly home scale weight. Support via our counseling and dietician teams are available with a referral. Please let me know if this individualized care is desired.      Self-Monitoring - The Way to Successful Weight Management    By Krystal Zarate RD, MICKN, Sunni Whitfield RD, STEFANY, Contreras Ivy PA-C, and Denise Mazariegos MD    OAC www.obesityaction.org Winter 2008 Resource    One major and possibly most important behavioral interventional strategy for weight management and lifestyle change is self-monitoring. Behavioral interventions are a central aspect in treatments to promote lifestyle changes that lead to weight-loss, prevent weight gain or weight regain and improve physical fitness. In the past, self-monitoring has unfortunately been one of the least popular techniques for those in weight management programs, and in some cases it is even thought of as a punishment. Because self-monitoring is critical for success with lifestyle changes, it is important to look at the various self monitoring techniques.    What is Self-monitoring?  Self-monitoring refers to the observing and recording of eating and exercise patterns, followed by feedback on the behaviors. The goal of self-monitoring is to increase self-awareness of target behaviors and outcomes, thus it can serve as an early warning system if problems are arising and can help track success. Some commonly used self-monitoring techniques include:    Food diaries  Regular self-weighing  Exercise logs  Equipment such as pedometers, accelerometers and metabolic devices  Food Logs and Diaries  One of the most common and important types of self-monitoring strategies in weight management programs is keeping a food log, in which individuals  record foods, exercises or beverages as soon as they are consumed.    One important technique with food logs is individuals recording what they eat or drink as it is consumed; otherwise it may not give an accurate account of the day’s intake. A good “rule of thumb” for food logs is: “if you bite it, you write it!”    The minimum information for weight-loss that should be kept in food logs is type, amount and caloric content of food or beverage consumed. This provides the ability to track and balance the number of calories consumed throughout the day with the amount of calories expended throughout the day.    Other nutritional information that can be logged includes: time of day of eating, fat content and carbohydrate grams. Disease-specific food logs can also be kept. For example: focusing on carbohydrate content instead of calories for patients with diabetes or insulin resistance.    Food Diaries  Another helpful tool in self-monitoring is keeping a food diary. Food diaries differ from food logs because they include more detailed information. They are useful if you are trying to find behavioral reasons or psychological aspects for eating.    Depending on the person and behavioral complexities involved, some food diaries could include the stress level, mood or feelings surrounding eating, activity or location or other environmental or emotional triggers for eating. The more complex or detailed, the better the feedback.    However, in today’s society it is almost impossible for most people to keep highly detailed daily food records over the long-term, therefore, compliance is often very low with detailed food diaries. By suggesting that patients keep a detailed food record for a few days each week, perhaps major areas of focus for nutritional and behavioral intervention can be recognized.    Logging Your Food Online  Online food logs and diaries or computer software are quick and convenient ways to keep records of  foods consumed in our technologically advanced world. Many Web sites are available for tracking of foods and calories throughout the day, some of which are free and very easy to use.    You can look up food choices and/or alternative choices in online databases of more than 50,000 foods. Internet-savvy loggers may choose to keep their journals online. Others may just choose to use these databases as a more convenient way of looking up nutritional value of foods. Some free online diaries include:    Free Web sites for searching nutritional information are available, an example is www.CDNlion. These Web sites may also offer exercise tracking and ideas, support, motivational tips and chat or discussion rooms.    Hand-held Calorie Counters  Another option for those who are “on the go” are handheld devices for calorie counting. Some of the devices are stand-alone such as CalorieSmart® or HealthFitCounter®. Others need to connect to Web sites. Other devices are installed in your Palm or Pocket-PC such as Diet Diary by PeopleDoc. They let you download updates when nutrition facts change, however, some of them use a lot of memory.    Regular Weighing  Weighing yourself is an important and simple self-monitoring behavior to serve as reminder of one’s eating and physical activity habits. Although it may be hard and sometimes discouraging to weigh yourself while losing weight, it is recommended to weigh yourself weekly, preferably outside of the home on the same scale.    Using the scale at the local gym or exercise facility or your doctor’s office may be more accurate than home scales. However, if this is unrealistic, it is okay to use a home scale. Try to weigh yourself at the same time of day and the same day of the week.    Writing down your weekly weights on a table, graph or calendar can help you keep track of your success or to help you get back on track more quickly. It is important to note that weighing  yourself more frequently than weekly is not recommended, as day to day fluctuations are not indicators of actual weight. Regular monitoring of your weight is also essential to help you maintain your weight after losing weight.    Exercise Logs  Another self-monitoring technique, along the same lines as food logs and diaries, is keeping an exercise log or diary. The number of minutes engaged and type and level of exertion of physical activity should ideally be recorded.    An important and often forgotten aspect of exercise logs is the level of perceived exertion. Walking for 30 minutes, at an easy compared to a hard pace, will result in different levels of calories burned and cardiovascular impact.    Typically, an easy physical activity that does not increase heart rate much, or alter breathing would usually be the pace that you walk around work or go shopping. Moderate level of physical exertion is when you are getting a mildly increased heart and breathing rate. Heavy or hard level of physical exertion would be sweating, increased heart rate (target heart rate range) as well as increased breathing.    Remember, physical activity can be done at one time or intermittently throughout the day. Logging exercise can be a positive feedback or a reminder to incorporate more exercise or physical activity into your daily routine.    Initial activities may be walking, riding a stationary bike or swimming at a slow pace. Other types of exercise that can be fun are dancing, exercise videos or chair exercises. You should try to aim for 30 minutes of exercise on most days of the week.    Many people try to start out with exercise on three or four days of the week. However, if you can get yourself exercising most to all days of the week, even if only for 10 or 15 minutes, it will become more of a routine for you.    Healthy Lifestyle Tip  All adults should set a long-term goal to accumulate at least 30 minutes or more of  moderate-intensity physical activity on most to all days of the week. Also, try to increase activities of daily living such as taking the stairs instead of the elevator, parking further away or walking to a bathroom that is further from your desk. Reducing sedentary time is a good strategy to increase activity by undertaking frequent, less strenuous activities. With time, you may be able to engage in more strenuous activities.    Pedometers  Self-monitoring tools are becoming more and more popular and accurate. One of the simplest of these self-monitoring tools is a pedometer. Pedometers give objective data of physical activity throughout the day. Pedometers can be found in almost any consumer Webroot or retail store.    Some of the more popular manufactures include Digi-Walker, Bringrr, Nanobiotix, ILink Global, TheJobPost, Merus, Freestyle, IIX Inc., Yemeksepeti and many others. Slicethepie and Burst[employer] make pedometer of speedometer devices that calculate steps and speed using GPS. These clip-on devices are inexpensive, ranging from less than $15 up to $75.    Many people get an average of 3,000 steps per day with daily activity. In order to burn off extra calories for weight-loss, walking 10,000 steps per day is recommended. For regular health, a minimum of 6,000 steps per day is required. Research suggests that a deliberate walk of 4,000-6,000 steps will help with weight-loss. It is often a good idea to keep track of your daily steps taken in your exercise log.    Pedometers can be frustrating for those who are more interested in distance traveled. Focusing on the number of steps and ways to incorporate more steps throughout the day will make as much of a difference with weight-loss as actual distance does. Pedometers encourage people to find ways to add more steps throughout the day.    Because step counting is becoming more popular, advances are being made in the technology behind pedometers. New pedometers display  steps and count them accurately. They are meant to be worn everyday and all day, as motivation to keep stepping, Most are small and comfortable to wear.    Pedometers sense your body motion, counting your footsteps usually by a turned pendulum technology, a coiled spring mechanism and a hairspring mechanism (which is the least accurate). The unit should be accurate in its count when you wear it correctly. You may need to experiment with where to wear it. You can measure your stride and then the pedometer can estimate distance traveled.    Some pedometers today offer multifunction options like calorie estimates, clocks, timers, stopwatches, speed estimators, seven-day memory or pulse rate readers, voice feedback and radios.    Accelerometers  Although pedometers are very cost-effective, one of the main flaws in using pedometers, however, is that they do not record intensity (how hard) or duration (how long) or frequency (how often) movement occurs. Accelerometers are devices that can objectively measure frequency, duration and intensity of physical activity.    Accelerometers provide a high level of accuracy when assessing physical activity. There are a variety of commercially available accelerometers, or activity monitors, which come in a wide range of prices anywhere from $50 to $1,000. [employer]rainer and Nike are examples of affordable accelerometers.    Many of the more expensive accelerometers are used only in research or as a part of a hospital-based program. These monitors are more complex than pedometers in that they display and store more complex data. Some are designed to download to a computer for analysis of intensity levels, movements and physical activity patterns. They can also be used to estimate calories burned or energy expenditure.    Accelerometers have sophisticated sensors that convert physical movement into an electrical signal that is relative to the muscular force needed to produce the work.  Accelerometers can be found in uniaxial or triaxial measures. Uniaxial accelerometers measure in a single plane and can be attached to the trunk or limbs. Triaxial accelerometers measure along three planes: vertical, medial-lateral and anterior-posterior.    Although accelerometers are a step up from pedometers in accuracy of physical activity, they cannot register resistance. Therefore, if you are strength training or adding resistance to your bike or treadmill or adding an incline to your walking, it will not be able to discern the added level of energy required to do that work.    Metabolic Devices  One of the most accurate and most expensive tools for self-monitoring are tools that have very sophisticated monitoring and interpreting sensors for calories burned. Many of these devices have options to subscribe to a Web-based calorie counter system that integrates the amount of calories burned measured by the equipment and your calories consumed that you enter in easy to use food logs.    These devices are more accurate in measurements of calories expended because they use not only accelerometer technology, but also heat flux sensors, galvanic skin response (to measure physical exertion and emotional stimuli) and skin temperature gauges. Some also include heart rate monitoring techniques. All of these technologies combined lead to a very accurate measurement of calories expended throughout the day.    These devices can determine if you are sitting, sleeping, jogging, walking, lifting weights or riding in a car. Many of these devices are very expensive and used primarily for research, however, some are available commercially.    This technology is also employed by hospital-based programs. Patients wear the hospital’s armband and track their nutrition on the Web site or computer-based program for typically one to two weeks. When they return to the clinic, the information will be uploaded and the practitioners will be  able to work with the patients with objective data on metabolic lifestyle patterns.    Practitioners can also monitor patients on integrated software applications to provide consultations without being face to face. Practitioners have the ability to set daily goals to tailor programs to the individual patient. These are great tools to help objectively monitor behavior and physical activity, as well as providing real time feed back to the patient. [employer] is one company that offers this technology.    Conclusion  Although specific diseases and treatments vary, behavior modification is the major key in weight-loss or prevention and decreases the risk of diseases. Self-monitoring is a key to behavior modifications, and there are a multitude of ways to self-monitor. With technology advancements, self-monitoring techniques are changing and improving to help defeat some of the major barriers to compliance. The bottom line is that no matter how you do it, self-monitoring should be an important part of your weight-loss, weight maintenance or healthy lifestyle change. Then, the next step is to be sure the self-monitoring translates into positive behavior changes with regards to diet and exercise.    What are proteins?  Proteins are one of three primary macronutrients that provide energy to the human body, along with fats and carbohydrates. Proteins are also responsible for a large portion of the work that is done in cells; they are necessary for proper structure and function of tissues and organs, and also act to regulate them. They are comprised of a number of amino acids that are essential to proper body function, and serve as the building blocks of body tissue.  There are 20 different amino acids in total, and the sequence of amino acids determines a protein's structure and function. While some amino acids can be synthesized in the body, there are 9 amino acids that humans can only obtain from dietary sources  (insufficient amounts of which may sometimes result in death), termed essential amino acids. Foods that provide all of the essential amino acids are called complete protein sources, and include both animal (meat, dairy, eggs, fish) as well as plant-based sources (soy, quinoa, buckwheat).    Proteins can be categorized based on the function they provide to the body. Below is a list of some types of proteins:  Antibody--proteins that protect the body from foreign particles, such as viruses and bacteria, by binding to them  Enzyme--proteins that help form new molecules as well as perform the many chemical reactions that occur throughout the body  Messenger--proteins that transmit signals throughout the body to maintain body processes  Structural component--proteins that act as building blocks for cells that ultimately allow the body to move  Transport/storage--proteins that move molecules throughout the body  As can be seen, proteins have many important roles throughout the body, and as such, it is important to provide sufficient nutrition to the body to maintain healthy protein levels.    How much protein do I need?  The amount of protein that the human body requires daily is dependent on many conditions, including overall energy intake, growth of the individual, and physical activity level. It is often estimated based on body weight, as a percentage of total caloric intake (10-35%), or based on age alone. 0.8g/kg of body weight is a commonly cited recommended dietary allowance (RDA). This value is the minimum recommended value to maintain basic nutritional requirements, but consuming more protein, up to a certain point, maybe beneficial, depending on the sources of the protein.  The recommended range of protein intake is between 0.8 g/kg and 1.8 g/kg of body weight, dependent on the many factors listed above. People who are highly active, or who wish to build more muscle should generally consume more protein. Some  sources suggest consuming between 1.8 to 2 g/kg for those who are highly active. The amount of protein a person should consume, to date, is not an exact science, and each individual should consult a specialist, be it a dietitian, doctor, or , to help determine their individual needs. I recommend your daily protein intake to be 80 grams/day minimum.    Foods high in protein  There are many different combinations of food that a person can eat to meet their protein intake requirements. For many people, a large portion of protein intake comes from meat and dairy, though it is possible to get enough protein while meeting certain dietary restrictions you might have. Generally, it is easier to meet your RDA of protein by consuming meat and dairy, but an excess of either can have a negative health impact. There are plenty of plant-based protein options, but they generally contain less protein in a given serving. Ideally, a person should consume a mixture of meat, dairy, and plant-based foods in order to meet their RDA and have a balanced diet replete with nutrients.    If possible, consuming a variety of complete proteins is recommended. A complete protein is a protein that contains a good amount of each of the nine essential amino acids required in the human diet. Examples of complete protein foods or meals include:    Meat/Dairy examples  Eggs  Chicken breast  Cottage cheese  Greek yogurt  Milk  Lean beef  Tuna  Turkey breast  Fish  Shrimp    Vegan/plant-based examples  Buckwheat  Hummus and simone  Soy products (tofu, tempeh, edamame beans)  Peanut butter on toast or some other bread  Beans and rice  Quinoa  Hemp and neha seeds  Spirulina  Generally, meat, poultry, fish, eggs, and dairy products are complete protein sources. Nuts and seeds, legumes, grains, and vegetables, among other things, are usually incomplete proteins. There is nothing wrong with incomplete proteins however, and there are many  healthy, high protein foods that are incomplete proteins. As long as you consume a sufficient variety of incomplete proteins to get all the required amino acids, it is not necessary to specifically eat complete protein foods. In fact, certain high fat red meats for example, a common source of complete proteins, can be unhealthy.   Below are some examples of high protein foods that are not complete proteins:  Almonds  Oats   Broccoli  Lentils  Hamlet bread  Paulo seeds  Pumpkin seeds  Peanuts  Inez sprouts  Grapefruit  Green peas  Avocados  Mushrooms  As can be seen, there are many different foods a person can consume to meet their RDA of protein. The examples provided above do not constitute an exhaustive list of high protein or complete protein foods. As with everything else, balance is important, and the examples provided above are an attempt at providing a list of healthier protein options (when consumed in moderation).    Amount of protein in common food      Protein Amount  Milk (1 cup/8 oz)  8 g  Egg (1 large/50 g)  6 g  Meat (1 slice / 2 oz)  14 g  Seafood (2 oz)  16 g  Bread (1 slice/64 g)   8 g  Corn (1 cup/166 g)  16 g  Rice (1 cup/195 g)  5 g  Dry Bean (1 cup/92 g)   16 g  Nuts (1 cup/92 g)    20 g  Fruits and Veggie (1 cup)  1 g    Data above taken from www.calculator.net    The Power of Protein:    High Protein Foods:  FISH  (3-6 ounces/meal)  All types of fish  Seafood (shrimp, scallops, clams, mussels, lobster)  EGGS     2-3 eggs/meal  DAIRY (2/3 to 1 ½ cup)  Cottage cheese   Greek yogurt  PLANTS (½-3/4 cup/meal)  Legumes: Dried beans and peas (black beans, craig beans, garbanzo beans, kidney, cannellini, navy, split peas, black eyed peas)  Lentils  Quinoa  Soy (edamame, tofu)  PORK   (3-6 oz/meal)  Tenderloin  Pork chop  Top loin roast, boneless  Sirloin roast, boneless  Hamer marrufo (nitrate free)  Boiled deli ham (nitrate free)    Additional Protein Sources:  BEEF    (3-6 oz/meal)  Flank  steak       Skirt steak  Bottom round(rump roast), select    Ground beef, 90% lean (ground sirloin)  Edwin eye steak, choice  Eye of round roast, choice   POULTRY  (3-6 oz/meal)  Ground chicken or turkey  Chicken, no skin  Turkey, no skin  PROTEIN SHAKES: OWYN, Koia, and Rebbl (plant based and dairy free), [employer]power [employer], Ousmane (plant based option available), Premier Protein, JuicePlus Complete (www.juiceplus.com)  PROTEIN BARS: RXBAR, PowerCrunch, Quest, Barebells, Mosh  SNACK/ON-the-GO OPTIONS: Chomps Meatstick, Oats Overnight (www.oatsovernight.com), [place] brand protein granola      Exercise: Why Fitness Matters  Starting an exercise program can take time to develop into a habit, typically 6-8 weeks. Begin at your own pace.  A lifetime of fitness offers many benefits like:  Decreasing your risk of health problems, such as heart disease, high blood pressure, diabetes, and some types of cancer.  Managing your weight.  Helping you sleep better.  Preventing or relieving stress, depression, and back problems  Boosting your energy.  You need to continue exercising to keep these benefits. Your main goal is to make fitness a lifetime commitment. Build a fitness plan that you can stick with. Choose activities you like. Go slowly, especially when just starting out. Work up to being active 30 minutes on most days. Aim for a total of 150 or more minutes a week.  Why be fit?  People who are physically fit:  Are more alert and productive.  Have more energy, both physically and mentally.  Handle stress better.  Sleep better.  Are less prone to injury.     © 3025-6679 [employer]. [address] [place] [address], [place] [address]. All rights reserved. This information is not intended as a substitute for professional medical care. Always follow your healthcare professional's instructions.      Medication use and SEs reviewed with patient.    Return in about 4 months (around 6/6/2025) for weight management via  Telemedicine Visit and in clinic in September.    DOCUMENTATION OF TIME SPENT: Code selection for this visit was based on time spent : 30 minutes on date of service in preparing to see the patient, obtaining and/or reviewing separately obtained history, performing a medically appropriate examination, counseling and educating the patient/family/caregiver, ordering medications or testing, referring and communicating with other healthcare providers, documenting clinical information in the electronic medical record, independently interpreting results and communicating results to the patient/family/caregiver and care coordination with the patient's other providers.

## 2025-02-06 NOTE — PATIENT INSTRUCTIONS
Continue making lifestyle changes that focus on good nutrition, regular exercise and stress management.    Medication Plan: Continue current medication regimen, remaining off Wegovy and switch to Zepbound as directed. Start Zepbound at 2.5 mg weekly. After 4 weeks increase to the next dose of 5 mg weekly. If at a weight plateau for >3 weeks, then send [employer] message with current scale weight to determine if a dose adjustment is appropriate. Otherwise plan to maintain this dose until next visit. Any further dose titrations beyond this dose will be considered at appointments only, unless otherwise discussed. Visit the website www.[email or web address]."[employer] and click on Consumers for additional details, savings, and further dosing instructions. This medication may require a prior authorization (PA) by your insurance. A PA may take one week plus to complete and our office will be in touch during this process if needed.     Tips while taking an injectable medication:    Be an intuitive eater. Listen to your hunger and fullness signals, stopping when you are full.  Consume protein and produce in your day, striving for a rainbow of color of produce.  Reduce portions to starting size of 1 cup and check in with your gut to see if you are full. Use a sand timer to slow down your eating pace to allow for 15-20 minutes to complete a meal and use the \"2 bite rule\".  Reduce refined sugars and high fat foods, as they may contribute to greater side effects of nausea and heartburn.  Stop eating 3 hours before bedtime to allow your food to digest.  Remain hydrated with water or non caloric and non caffeine beverages.  Use over the counter quinn lozenge/supplement to help reduce nausea if needed.  If you have been off your medication for more than 2 weeks please notify our office to determine next dosing, as a return to previous dose may not be appropriate or tolerated.    Next Steps: Start tracking nutrition with a focus on caloric  reduction and protein.    Start and/or continue tracking nutrition to remain accountable for both quality and quantity of food. Recommend setting weekly weight loss goal to 1.5-2# and caution adding your exercise, as the nicole may overestimate your daily calories. Use a food scale, measuring cups and spoons. Purchase serving dishes that are 1/4 cup, 1/2 cup and 1 cup servings. Use an nicole such as [facility] ([email or web address]), [employer], or LoseIT! to provide accountably, structure and support. Consider a consult or follow up with one of our dieticians to help modify and/or support your nutrition plan for weight loss as well as maintenance.    I recommend self monitoring to support behavior change and to learn how your environment individually impacts YOUR body. This will help promote intuitive eating practices. Goal is to track nutrition 2x/week via paper log or using an nicole such as [facility] ([email or web address]) or LoseIT!. I also advise checking and logging a weekly home scale weight. Support via our counseling and dietician teams are available with a referral. Please let me know if this individualized care is desired.      Self-Monitoring - The Way to Successful Weight Management    By Krystal Zarate RD, MICKN, Sunni Whitfield RD, STEFANY, Contreras Ivy PA-C, and Denise Mazariegos MD    [facility] www.[email or web address] Winter 2008 Resource    One major and possibly most important behavioral interventional strategy for weight management and lifestyle change is self-monitoring. Behavioral interventions are a central aspect in treatments to promote lifestyle changes that lead to weight-loss, prevent weight gain or weight regain and improve physical fitness. In the past, self-monitoring has unfortunately been one of the least popular techniques for those in weight management programs, and in some cases it is even thought of as a punishment. Because self-monitoring is critical for success with lifestyle changes, it is important  to look at the various self monitoring techniques.    What is Self-monitoring?  Self-monitoring refers to the observing and recording of eating and exercise patterns, followed by feedback on the behaviors. The goal of self-monitoring is to increase self-awareness of target behaviors and outcomes, thus it can serve as an early warning system if problems are arising and can help track success. Some commonly used self-monitoring techniques include:    Food diaries  Regular self-weighing  Exercise logs  Equipment such as pedometers, accelerometers and metabolic devices  Food Logs and Diaries  One of the most common and important types of self-monitoring strategies in weight management programs is keeping a food log, in which individuals record foods, exercises or beverages as soon as they are consumed.    One important technique with food logs is individuals recording what they eat or drink as it is consumed; otherwise it may not give an accurate account of the day’s intake. A good “rule of thumb” for food logs is: “if you bite it, you write it!”    The minimum information for weight-loss that should be kept in food logs is type, amount and caloric content of food or beverage consumed. This provides the ability to track and balance the number of calories consumed throughout the day with the amount of calories expended throughout the day.    Other nutritional information that can be logged includes: time of day of eating, fat content and carbohydrate grams. Disease-specific food logs can also be kept. For example: focusing on carbohydrate content instead of calories for patients with diabetes or insulin resistance.    Food Diaries  Another helpful tool in self-monitoring is keeping a food diary. Food diaries differ from food logs because they include more detailed information. They are useful if you are trying to find behavioral reasons or psychological aspects for eating.    Depending on the person and behavioral  complexities involved, some food diaries could include the stress level, mood or feelings surrounding eating, activity or location or other environmental or emotional triggers for eating. The more complex or detailed, the better the feedback.    However, in today’s society it is almost impossible for most people to keep highly detailed daily food records over the long-term, therefore, compliance is often very low with detailed food diaries. By suggesting that patients keep a detailed food record for a few days each week, perhaps major areas of focus for nutritional and behavioral intervention can be recognized.    Logging Your Food Online  Online food logs and diaries or computer software are quick and convenient ways to keep records of foods consumed in our technologically advanced world. Many Web sites are available for tracking of foods and calories throughout the day, some of which are free and very easy to use.    You can look up food choices and/or alternative choices in online databases of more than 50,000 foods. Internet-savvy loggers may choose to keep their journals online. Others may just choose to use these databases as a more convenient way of looking up nutritional value of foods. Some free online diaries include:    Free Web sites for searching nutritional information are available, an example is www.[employer]. These Web sites may also offer exercise tracking and ideas, support, motivational tips and chat or discussion rooms.    Hand-held Calorie Counters  Another option for those who are “on the go” are handheld devices for calorie counting. Some of the devices are stand-alone such as CalorieSmart® or HealthFitCounter®. Others need to connect to Web sites. Other devices are installed in your Palm or Pocket-PC such as Diet Diary by [employer]. They let you download updates when nutrition facts change, however, some of them use a lot of memory.    Regular Weighing  Weighing yourself is an  important and simple self-monitoring behavior to serve as reminder of one’s eating and physical activity habits. Although it may be hard and sometimes discouraging to weigh yourself while losing weight, it is recommended to weigh yourself weekly, preferably outside of the home on the same scale.    Using the scale at the local gym or exercise facility or your doctor’s office may be more accurate than home scales. However, if this is unrealistic, it is okay to use a home scale. Try to weigh yourself at the same time of day and the same day of the week.    Writing down your weekly weights on a table, graph or calendar can help you keep track of your success or to help you get back on track more quickly. It is important to note that weighing yourself more frequently than weekly is not recommended, as day to day fluctuations are not indicators of actual weight. Regular monitoring of your weight is also essential to help you maintain your weight after losing weight.    Exercise Logs  Another self-monitoring technique, along the same lines as food logs and diaries, is keeping an exercise log or diary. The number of minutes engaged and type and level of exertion of physical activity should ideally be recorded.    An important and often forgotten aspect of exercise logs is the level of perceived exertion. Walking for 30 minutes, at an easy compared to a hard pace, will result in different levels of calories burned and cardiovascular impact.    Typically, an easy physical activity that does not increase heart rate much, or alter breathing would usually be the pace that you walk around work or go shopping. Moderate level of physical exertion is when you are getting a mildly increased heart and breathing rate. Heavy or hard level of physical exertion would be sweating, increased heart rate (target heart rate range) as well as increased breathing.    Remember, physical activity can be done at one time or intermittently  throughout the day. Logging exercise can be a positive feedback or a reminder to incorporate more exercise or physical activity into your daily routine.    Initial activities may be walking, riding a stationary bike or swimming at a slow pace. Other types of exercise that can be fun are dancing, exercise videos or chair exercises. You should try to aim for 30 minutes of exercise on most days of the week.    Many people try to start out with exercise on three or four days of the week. However, if you can get yourself exercising most to all days of the week, even if only for 10 or 15 minutes, it will become more of a routine for you.    Healthy Lifestyle Tip  All adults should set a long-term goal to accumulate at least 30 minutes or more of moderate-intensity physical activity on most to all days of the week. Also, try to increase activities of daily living such as taking the stairs instead of the elevator, parking further away or walking to a bathroom that is further from your desk. Reducing sedentary time is a good strategy to increase activity by undertaking frequent, less strenuous activities. With time, you may be able to engage in more strenuous activities.    Pedometers  Self-monitoring tools are becoming more and more popular and accurate. One of the simplest of these self-monitoring tools is a pedometer. Pedometers give objective data of physical activity throughout the day. Pedometers can be found in almost any consumer catalog or retail store.    Some of the more popular manufactures include Digi-Walker, Om[employer], [employer]IT[employer], [employer], Texifter, [employer], Freestyle, [employer], Blink (air taxi) and many others. [employer] and [employer] make pedometer of speedometer devices that calculate steps and speed using GPS. These clip-on devices are inexpensive, ranging from less than $15 up to $75.    Many people get an average of 3,000 steps per day with daily activity. In order to burn off extra calories for weight-loss,  walking 10,000 steps per day is recommended. For regular health, a minimum of 6,000 steps per day is required. Research suggests that a deliberate walk of 4,000-6,000 steps will help with weight-loss. It is often a good idea to keep track of your daily steps taken in your exercise log.    Pedometers can be frustrating for those who are more interested in distance traveled. Focusing on the number of steps and ways to incorporate more steps throughout the day will make as much of a difference with weight-loss as actual distance does. Pedometers encourage people to find ways to add more steps throughout the day.    Because step counting is becoming more popular, advances are being made in the technology behind pedometers. New pedometers display steps and count them accurately. They are meant to be worn everyday and all day, as motivation to keep stepping, Most are small and comfortable to wear.    Pedometers sense your body motion, counting your footsteps usually by a turned pendulum technology, a coiled spring mechanism and a hairspring mechanism (which is the least accurate). The unit should be accurate in its count when you wear it correctly. You may need to experiment with where to wear it. You can measure your stride and then the pedometer can estimate distance traveled.    Some pedometers today offer multifunction options like calorie estimates, clocks, timers, stopwatches, speed estimators, seven-day memory or pulse rate readers, voice feedback and radios.    Accelerometers  Although pedometers are very cost-effective, one of the main flaws in using pedometers, however, is that they do not record intensity (how hard) or duration (how long) or frequency (how often) movement occurs. Accelerometers are devices that can objectively measure frequency, duration and intensity of physical activity.    Accelerometers provide a high level of accuracy when assessing physical activity. There are a variety of commercially  available accelerometers, or activity monitors, which come in a wide range of prices anywhere from $50 to $1,000. BioTrainer and [employer] are examples of affordable accelerometers.    Many of the more expensive accelerometers are used only in research or as a part of a hospital-based program. These monitors are more complex than pedometers in that they display and store more complex data. Some are designed to download to a computer for analysis of intensity levels, movements and physical activity patterns. They can also be used to estimate calories burned or energy expenditure.    Accelerometers have sophisticated sensors that convert physical movement into an electrical signal that is relative to the muscular force needed to produce the work. Accelerometers can be found in uniaxial or triaxial measures. Uniaxial accelerometers measure in a single plane and can be attached to the trunk or limbs. Triaxial accelerometers measure along three planes: vertical, medial-lateral and anterior-posterior.    Although accelerometers are a step up from pedometers in accuracy of physical activity, they cannot register resistance. Therefore, if you are strength training or adding resistance to your bike or treadmill or adding an incline to your walking, it will not be able to discern the added level of energy required to do that work.    Metabolic Devices  One of the most accurate and most expensive tools for self-monitoring are tools that have very sophisticated monitoring and interpreting sensors for calories burned. Many of these devices have options to subscribe to a Web-based calorie counter system that integrates the amount of calories burned measured by the equipment and your calories consumed that you enter in easy to use food logs.    These devices are more accurate in measurements of calories expended because they use not only accelerometer technology, but also heat flux sensors, galvanic skin response (to measure physical  exertion and emotional stimuli) and skin temperature gauges. Some also include heart rate monitoring techniques. All of these technologies combined lead to a very accurate measurement of calories expended throughout the day.    These devices can determine if you are sitting, sleeping, jogging, walking, lifting weights or riding in a car. Many of these devices are very expensive and used primarily for research, however, some are available commercially.    This technology is also employed by hospital-based programs. Patients wear the hospital’s armband and track their nutrition on the Web site or computer-based program for typically one to two weeks. When they return to the clinic, the information will be uploaded and the practitioners will be able to work with the patients with objective data on metabolic lifestyle patterns.    Practitioners can also monitor patients on integrated software applications to provide consultations without being face to face. Practitioners have the ability to set daily goals to tailor programs to the individual patient. These are great tools to help objectively monitor behavior and physical activity, as well as providing real time feed back to the patient. [employer] is one company that offers this technology.    Conclusion  Although specific diseases and treatments vary, behavior modification is the major key in weight-loss or prevention and decreases the risk of diseases. Self-monitoring is a key to behavior modifications, and there are a multitude of ways to self-monitor. With technology advancements, self-monitoring techniques are changing and improving to help defeat some of the major barriers to compliance. The bottom line is that no matter how you do it, self-monitoring should be an important part of your weight-loss, weight maintenance or healthy lifestyle change. Then, the next step is to be sure the self-monitoring translates into positive behavior changes with regards to diet  and exercise.    What are proteins?  Proteins are one of three primary macronutrients that provide energy to the human body, along with fats and carbohydrates. Proteins are also responsible for a large portion of the work that is done in cells; they are necessary for proper structure and function of tissues and organs, and also act to regulate them. They are comprised of a number of amino acids that are essential to proper body function, and serve as the building blocks of body tissue.  There are 20 different amino acids in total, and the sequence of amino acids determines a protein's structure and function. While some amino acids can be synthesized in the body, there are 9 amino acids that humans can only obtain from dietary sources (insufficient amounts of which may sometimes result in death), termed essential amino acids. Foods that provide all of the essential amino acids are called complete protein sources, and include both animal (meat, dairy, eggs, fish) as well as plant-based sources (soy, quinoa, buckwheat).    Proteins can be categorized based on the function they provide to the body. Below is a list of some types of proteins:  Antibody--proteins that protect the body from foreign particles, such as viruses and bacteria, by binding to them  Enzyme--proteins that help form new molecules as well as perform the many chemical reactions that occur throughout the body  Messenger--proteins that transmit signals throughout the body to maintain body processes  Structural component--proteins that act as building blocks for cells that ultimately allow the body to move  Transport/storage--proteins that move molecules throughout the body  As can be seen, proteins have many important roles throughout the body, and as such, it is important to provide sufficient nutrition to the body to maintain healthy protein levels.    How much protein do I need?  The amount of protein that the human body requires daily is dependent on  many conditions, including overall energy intake, growth of the individual, and physical activity level. It is often estimated based on body weight, as a percentage of total caloric intake (10-35%), or based on age alone. 0.8g/kg of body weight is a commonly cited recommended dietary allowance (RDA). This value is the minimum recommended value to maintain basic nutritional requirements, but consuming more protein, up to a certain point, maybe beneficial, depending on the sources of the protein.  The recommended range of protein intake is between 0.8 g/kg and 1.8 g/kg of body weight, dependent on the many factors listed above. People who are highly active, or who wish to build more muscle should generally consume more protein. Some sources suggest consuming between 1.8 to 2 g/kg for those who are highly active. The amount of protein a person should consume, to date, is not an exact science, and each individual should consult a specialist, be it a dietitian, doctor, or , to help determine their individual needs. I recommend your daily protein intake to be 80 grams/day minimum.    Foods high in protein  There are many different combinations of food that a person can eat to meet their protein intake requirements. For many people, a large portion of protein intake comes from meat and dairy, though it is possible to get enough protein while meeting certain dietary restrictions you might have. Generally, it is easier to meet your RDA of protein by consuming meat and dairy, but an excess of either can have a negative health impact. There are plenty of plant-based protein options, but they generally contain less protein in a given serving. Ideally, a person should consume a mixture of meat, dairy, and plant-based foods in order to meet their RDA and have a balanced diet replete with nutrients.    If possible, consuming a variety of complete proteins is recommended. A complete protein is a protein that  contains a good amount of each of the nine essential amino acids required in the human diet. Examples of complete protein foods or meals include:    Meat/Dairy examples  Eggs  Chicken breast  Cottage cheese  Greek yogurt  Milk  Lean beef  Tuna  Turkey breast  Fish  Shrimp    Vegan/plant-based examples  Buckwheat  Hummus and simone  Soy products (tofu, tempeh, edamame beans)  Peanut butter on toast or some other bread  Beans and rice  Quinoa  Hemp and paulo seeds  Spirulina  Generally, meat, poultry, fish, eggs, and dairy products are complete protein sources. Nuts and seeds, legumes, grains, and vegetables, among other things, are usually incomplete proteins. There is nothing wrong with incomplete proteins however, and there are many healthy, high protein foods that are incomplete proteins. As long as you consume a sufficient variety of incomplete proteins to get all the required amino acids, it is not necessary to specifically eat complete protein foods. In fact, certain high fat red meats for example, a common source of complete proteins, can be unhealthy.   Below are some examples of high protein foods that are not complete proteins:  Almonds  Oats   Broccoli  Lentils  Hamlet bread  Paulo seeds  Pumpkin seeds  Peanuts  [place] sprouts  Grapefruit  Green peas  Avocados  Mushrooms  As can be seen, there are many different foods a person can consume to meet their RDA of protein. The examples provided above do not constitute an exhaustive list of high protein or complete protein foods. As with everything else, balance is important, and the examples provided above are an attempt at providing a list of healthier protein options (when consumed in moderation).    Amount of protein in common food      Protein Amount  Milk (1 cup/8 oz)  8 g  Egg (1 large/50 g)  6 g  Meat (1 slice / 2 oz)  14 g  Seafood (2 oz)  16 g  Bread (1 slice/64 g)   8 g  Corn (1 cup/166 g)  16 g  Rice (1 cup/195 g)  5 g  Dry Bean (1 cup/92 g)   16  g  Nuts (1 cup/92 g)    20 g  Fruits and Veggie (1 cup)  1 g    Data above taken from www.calculator.net    The Power of Protein:    High Protein Foods:  FISH  (3-6 ounces/meal)  All types of fish  Seafood (shrimp, scallops, clams, mussels, lobster)  EGGS     2-3 eggs/meal  DAIRY (2/3 to 1 ½ cup)  Cottage cheese   Greek yogurt  PLANTS (½-3/4 cup/meal)  Legumes: Dried beans and peas (black beans, craig beans, garbanzo beans, kidney, cannellini, navy, split peas, black eyed peas)  Lentils  Quinoa  Soy (edamame, tofu)  PORK   (3-6 oz/meal)  Tenderloin  Pork chop  Top loin roast, boneless  Sirloin roast, boneless  Albion marrufo (nitrate free)  Boiled deli ham (nitrate free)    Additional Protein Sources:  BEEF    (3-6 oz/meal)  Flank steak       Skirt steak  Bottom round(rump roast), select    Ground beef, 90% lean (ground sirloin)  Edwin eye steak, choice  Eye of round roast, choice   POULTRY  (3-6 oz/meal)  Ground chicken or turkey  Chicken, no skin  Turkey, no skin  PROTEIN SHAKES: KAYLI, Terrence, and Courtney (plant based and dairy free), Corepower by [employer], Ousmane (plant based option available), Premier Protein, JuicePlus Complete (www.juiceplus.com)  PROTEIN BARS: RXBAR, PowerCrunch, Quest, Barebells, Mosh  SNACK/ON-the-GO OPTIONS: Chomps Meatstick, Oats Overnight ([email or web address]), Oolitic brand protein granola      Exercise: Why Fitness Matters  Starting an exercise program can take time to develop into a habit, typically 6-8 weeks. Begin at your own pace.  A lifetime of fitness offers many benefits like:  Decreasing your risk of health problems, such as heart disease, high blood pressure, diabetes, and some types of cancer.  Managing your weight.  Helping you sleep better.  Preventing or relieving stress, depression, and back problems  Boosting your energy.  You need to continue exercising to keep these benefits. Your main goal is to make fitness a lifetime commitment. Build a fitness plan that you can stick  with. Choose activities you like. Go slowly, especially when just starting out. Work up to being active 30 minutes on most days. Aim for a total of 150 or more minutes a week.  Why be fit?  People who are physically fit:  Are more alert and productive.  Have more energy, both physically and mentally.  Handle stress better.  Sleep better.  Are less prone to injury.     © 9303-5284 [employer]. [address] [place] [address], [place] [address]. All rights reserved. This information is not intended as a substitute for professional medical care. Always follow your healthcare professional's instructions.

## 2025-02-11 ENCOUNTER — TELEPHONE (OUTPATIENT)
Dept: INTERNAL MEDICINE CLINIC | Facility: CLINIC | Age: 45
End: 2025-02-11

## 2025-02-17 ENCOUNTER — TELEPHONE (OUTPATIENT)
Dept: INTERNAL MEDICINE CLINIC | Facility: CLINIC | Age: 45
End: 2025-02-17

## 2025-02-18 ENCOUNTER — TELEPHONE (OUTPATIENT)
Facility: CLINIC | Age: 45
End: 2025-02-18

## 2025-02-18 ENCOUNTER — PATIENT MESSAGE (OUTPATIENT)
Facility: CLINIC | Age: 45
End: 2025-02-18

## 2025-04-22 ENCOUNTER — TELEMEDICINE (OUTPATIENT)
Dept: INTERNAL MEDICINE CLINIC | Facility: CLINIC | Age: 45
End: 2025-04-22
Payer: COMMERCIAL

## 2025-04-22 DIAGNOSIS — E78.00 HYPERCHOLESTEROLEMIA: ICD-10-CM

## 2025-04-22 DIAGNOSIS — R63.2 BINGE EATING: ICD-10-CM

## 2025-04-22 DIAGNOSIS — Z51.81 ENCOUNTER FOR THERAPEUTIC DRUG MONITORING: Primary | ICD-10-CM

## 2025-04-22 DIAGNOSIS — F41.9 ANXIETY: ICD-10-CM

## 2025-04-22 DIAGNOSIS — E66.811 CLASS 1 OBESITY WITH SERIOUS COMORBIDITY AND BODY MASS INDEX (BMI) OF 30.0 TO 30.9 IN ADULT, UNSPECIFIED OBESITY TYPE: ICD-10-CM

## 2025-04-22 PROCEDURE — 98005 SYNCH AUDIO-VIDEO EST LOW 20: CPT | Performed by: NURSE PRACTITIONER

## 2025-04-22 RX ORDER — TIRZEPATIDE 7.5 MG/.5ML
7.5 INJECTION, SOLUTION SUBCUTANEOUS WEEKLY
Qty: 2 ML | Refills: 0 | Status: SHIPPED | OUTPATIENT
Start: 2025-04-22

## 2025-04-22 RX ORDER — TIRZEPATIDE 10 MG/.5ML
10 INJECTION, SOLUTION SUBCUTANEOUS WEEKLY
Qty: 2 ML | Refills: 3 | Status: SHIPPED | OUTPATIENT
Start: 2025-04-22

## 2025-04-22 NOTE — PROGRESS NOTES
[facility] Weight Management follow up via video visit:    Subjective    This visit is conducted using Telemedicine with live, interactive video and audio.    Chief Complaint:  Routine follow up visit for lifestyle and medical management for overweight, obesity, or morbid obesity. LOV in clinic weight: 175#.    PMH reviewed. Bariatric surgery planned or hx of surgery in the past: no.    HPI:   Arleth Denis is a 44 year old female who is being followed up today for lifestyle and medical management as deemed appropriate for overweight, obesity or morbid obesity. Patient reports weight loss monitoring at home via scale with a weight of 164#. This appears to be a weight loss since LOV 2 months ago in clinic. Patient has been consistent with medication and tolerating Zepbound well without SEs.    Questions/Concerns/Comments since LOV: Feeling good about success. Reduction in food noise which has reduced snacking at night. No binge eating. Had triplets 10 y/o birthday this weekend. Struggling with getting regular exercise in place.     Lifestyle/Social Hx Reviewed:    [facility] Weight Loss Follow Up    Question 4/21/2025 12:33 PM CDT - Filed by Patient   Please describe a success moment: Down 10 pounds!   Please describe a challenging moment/needs for improvement: Need more Motivation for exercise   Please complete this 24 hour food journal, listing everything you had to eat in the past day. Include the average time of day you ate these meals at    List foods, qty and prep for breakfast: Nothing   List foods, qty and prep for lunch. 1 cup macaroni and cheese and 10 baked chicken fries   List foods, qty and prep for dinner. 5 breaded zucchini and 2 squares of pizza   List foods, qty and prep for snacks. Slice of birthday cake (my kids bday)   List the types and qty of fluids consumed Diet soda, 1 bottle of water   On average, how many meals did you eat out per week? 3   Exercise    How many days per week are you  active or exercise 0   On average, how many days were anaerobic (strength/resistance) exercises performed? 0   On average, how many days were aerobic (cardio) exercises performed? 0   Perceived level of exertion on a scale of 1-5, with 5 being very intense:    Stress    Average stress level on a scale of 1-10, with 10 being extremely stressed: 4   If greater than 5/1O how would you grade your coping mechanisms? fair   Sleep hours and integrity    How many hours of uninterrupted sleep do you get a night: 6   Do you feel rested in the morning: No   If no, what may have been disrupting your sleep? Dry mouth and sometimes sweating   Please list any goal(s) for your next visit Start walking/quick exercise routines     ROS  General: feeling well, denies fatigue  EYES: denies vision changes or high pain/pressure.  CV: denies cp, palpitations  Resp: denies sob  GI: denies abdominal pain. Denies N/V/D/C.  Neuro: denies paresthesia or cognitive changes  Psych: denies any mood changes    Physical Exam:  >   BP Readings from Last 3 Encounters:   02/06/25 110/74   05/02/24 116/76   04/23/24 110/68       Home weight: see above  Home BP: not available  Home blood sugars: n/a  Today's calculated BMI from reported weight: 28.2    General: patient speaking in full sentences, no increased work of breathing. alert, appears stated age, cooperative, and no distress  HENT: normocephalic  Resp: Breathing is non labored  Psych: patient appears cheerful, smiling, making good eye contact    Diagnoses and all orders for this visit:    Encounter for therapeutic drug monitoring  -     Tirzepatide-Weight Management (ZEPBOUND) 7.5 MG/0.5ML Subcutaneous Solution Auto-injector; Inject 7.5 mg into the skin once a week.  -     Tirzepatide-Weight Management (ZEPBOUND) 10 MG/0.5ML Subcutaneous Solution Auto-injector; Inject 10 mg into the skin once a week. Start after completing full 4 weeks on 7.5 mg weekly dose.    Class 1 obesity with serious  comorbidity and body mass index (BMI) of 30.0 to 30.9 in adult, unspecified obesity type  Comments:  Baseline BMI: 32.77 (8/22/22)  Orders:  -     OP REFERRAL TO [facility] ([facility] USE ONLY)  -     Tirzepatide-Weight Management (ZEPBOUND) 7.5 MG/0.5ML Subcutaneous Solution Auto-injector; Inject 7.5 mg into the skin once a week.  -     Tirzepatide-Weight Management (ZEPBOUND) 10 MG/0.5ML Subcutaneous Solution Auto-injector; Inject 10 mg into the skin once a week. Start after completing full 4 weeks on 7.5 mg weekly dose.    Hypercholesterolemia  -     OP REFERRAL TO [facility] ([facility] USE ONLY)  -     Tirzepatide-Weight Management (ZEPBOUND) 7.5 MG/0.5ML Subcutaneous Solution Auto-injector; Inject 7.5 mg into the skin once a week.  -     Tirzepatide-Weight Management (ZEPBOUND) 10 MG/0.5ML Subcutaneous Solution Auto-injector; Inject 10 mg into the skin once a week. Start after completing full 4 weeks on 7.5 mg weekly dose.    Binge eating  -     OP REFERRAL TO [facility] ([facility] USE ONLY)    Anxiety  -     OP REFERRAL TO [facility] ([facility] USE ONLY)        Patient Instructions   Continue making lifestyle changes that focus on good nutrition, regular exercise and stress management.    Medication Plan: Continue current medication regimen, aside from increase Zepbound to 7.5 mg weekly x4 weeks and then increase to 10 mg weekly thereafter. No refills needed at this time on Fluoxetine and Bupropion XL.      Tips while taking an injectable medication:    Be an intuitive eater. Listen to your hunger and fullness signals, stopping when you are full.  Consume protein and produce in your day, striving for a rainbow of color of produce.  Reduce portions to starting size of 1 cup and check in with your gut to see if you are full. Use a sand timer to slow down your eating pace to allow for 15-20 minutes to complete a meal and use the \"2 bite rule\".  Reduce refined sugars and high fat foods, as they may contribute  to greater side effects of nausea and heartburn.  Stop eating 3 hours before bedtime to allow your food to digest.  Remain hydrated with water or non caloric and non caffeine beverages.  Use over the counter quinn lozenge/supplement to help reduce nausea if needed.  If you have been off your medication for more than 2 weeks please notify our office to determine next dosing, as a return to previous dose may not be appropriate or tolerated.    Next Steps, plan and goals for next visit: The person you are and the person you want to BE are only  by DISCIPLINE. Consider a consult with our dietician for support if needed, referral placed. Recommend structured fitness class to help get started with fitness as a tool for motivation.      Motivational Tips to Overcome the Mental Plateau with Weight-loss  April 17, 2019  Posted in Blog, Motivation  By Your Weight Matters Campaign     When it comes to weight-loss, you might be familiar with the plateau period on the scale. But have you ever experienced a mental plateau when your motivation just…isn't there anymore?  Weight-loss is hard and requires a lot of physical and mental energy. If you're feeling completely burned out and you don't have much drive left, fill-up your tank with these motivational tips.  How to Bust through the Mental Plateau  First, Set a Non Scale-related Goal  Don't let the scale have total control of your actions. Set a NEW goal that will instill in you a fighting spirit. Perhaps you could start training for a 5K marathon race, try four new healthy recipes next month, or resolve to master a fun new fitness skill.  Take a Break from the Nitty Gritty  Strict and rigorous routines can be energy-depleting, so try taking a small break. Hang back from the gym a few times this week and take your workout outside, or indulge in a “cheat food” you've been craving to break up the monotony of structured meals.  Give Yourself Something You Need  All work  and no play isn't very motivating. You've put the pedal to the medal, so reward yourself with something you might really want or need (not food-related) to distract your mind.  Connect with Others on Similar Paths  The structured routine of our daily lives can be quite isolating. Add some spark to your day or week by getting social and spending time with your accountability partner(s), spouse, friends or close family. If any of them share similar goals, they can give you encouragement.  Try Something Completely Different  Whatever you've been doing to take the weight off… do a 180. Take your workouts somewhere else or try a new exercise altogether. Pick a new cuisine to try out and use it to meal prep your lunches. Put your scale away for a week and focus on your lifestyle, not the number.  Remember that you're not a machine. Without rest and rejuvenation, you probably won't have much luck sticking to the same rigorous weight-loss plan. Plateaus are natural, and they usually mean it's time for a new spark. Go with the flow, shake things up and return to your goals with new energy. Before you know it, you'll be back on the fast track to long-term success.  S.M.A.R.T Goals: What They Are and Why They’re Important  July 19, 2022  Posted in Blog, Motivation  By Your Weight Matters Campaign    When you are working on health and wellness goals, S.M.A.R.T. goals are frequently used to keep moving toward your endpoint. Whether you are training for a 5K, reducing your sugar intake, or improving your sleep habits, S.M.A.R.T. goals can help you along the way. Setting and following structured goals is key to any health or behavior change.    What Are S.M.A.R.T. Goals?    S: Specific  This is not the time to be vague. This is the time to decide exactly what you want to do. For example, a goal of “I will lose weight” or “I will improve my sleep” is not specific. Work on adding specific information to your goals. “I will lose 10  lbs.” or “I will sleep at least six hours each night” are more specific and provide more direction. Specificity in goal-setting helps you realize exactly what you are working for.    M: Measurable  Make sure your goals are measurable. It’s very hard to determine if your goals are met without this element. “I will be healthy” or “I will be happy” are not measurable and can easily be forgotten. Measurable goals look a little different. “I will improve my health by sleeping six hours each night and doing yoga three times per week” is one example. Another would be, “I will work on my happiness by doing something for myself for 15 minutes each day.”    A: Attainable  It’s important to make sure your goal can be met and that it’s within reach. A new exerciser would struggle to meet the goal of “I will exercise at the gym for 60 minutes each day this month.” A more realistic goal would be, “I will walk at least three times a week for 30 minutes.” Once you meet your goals, make new ones. Reach a goal and set one more. Always have a goal in mind to work towards!    R: Realistic  It’s equally important to make sure the goal you are working towards is realistic. Is it realistic to get back to a high school weight, compete a triathlon, or get rid of all the stress in your life? Probably not, but you can set goals that are realistic for your body and your lifestyle.    T: Timeframe  Give yourself a deadline. When will your goal be completed? Set an end date. For example, you might decide, “I will lose 10 lbs. by Fall break” or “I will keep a daily food log until school starts.” Assess where you are at the end of the timeframe and set a new goal, or revamp the old one if it didn’t work out for you.    Conclusion  Set aside some time on a regular basis to come up with a plan to set some goals. Goals in all areas of your life are important and motivate you to be healthier. Once you meet your goals, find something you can work on  next. The alvarado is the limit when it comes to you and your health!          Return in about 6 months (around 10/22/2025) for weight management via clinic.    DOCUMENTATION OF TIME SPENT: Code selection for this visit was based on time spent : 20 minutes on date of service in preparing to see the patient, obtaining and/or reviewing separately obtained history, performing a medically appropriate examination, counseling and educating the patient/family/caregiver, ordering medications or testing, referring and communicating with other healthcare providers, documenting clinical information in the electronic medical record, independently interpreting results and communicating results to the patient/family/caregiver and care coordination with the patient's other providers.    Answers submitted by the patient for this visit:  Medical Weight Loss Follow Up (Submitted on 4/21/2025)  If greater than 5/1O how would you grade your coping mechanisms?: fair

## 2025-04-22 NOTE — PATIENT INSTRUCTIONS
Continue making lifestyle changes that focus on good nutrition, regular exercise and stress management.    Medication Plan: Continue current medication regimen, aside from increase Zepbound to 7.5 mg weekly x4 weeks and then increase to 10 mg weekly thereafter. No refills needed at this time on Fluoxetine and Bupropion XL.      Tips while taking an injectable medication:    Be an intuitive eater. Listen to your hunger and fullness signals, stopping when you are full.  Consume protein and produce in your day, striving for a rainbow of color of produce.  Reduce portions to starting size of 1 cup and check in with your gut to see if you are full. Use a sand timer to slow down your eating pace to allow for 15-20 minutes to complete a meal and use the \"2 bite rule\".  Reduce refined sugars and high fat foods, as they may contribute to greater side effects of nausea and heartburn.  Stop eating 3 hours before bedtime to allow your food to digest.  Remain hydrated with water or non caloric and non caffeine beverages.  Use over the counter quinn lozenge/supplement to help reduce nausea if needed.  If you have been off your medication for more than 2 weeks please notify our office to determine next dosing, as a return to previous dose may not be appropriate or tolerated.    Next Steps, plan and goals for next visit: The person you are and the person you want to BE are only  by DISCIPLINE. Consider a consult with our dietician for support if needed, referral placed. Recommend structured fitness class to help get started with fitness as a tool for motivation.      Motivational Tips to Overcome the Mental Plateau with Weight-loss  April 17, 2019  Posted in Blog, Motivation  By Your Weight Matters Campaign     When it comes to weight-loss, you might be familiar with the plateau period on the scale. But have you ever experienced a mental plateau when your motivation just…isn't there anymore?  Weight-loss is hard and  requires a lot of physical and mental energy. If you're feeling completely burned out and you don't have much drive left, fill-up your tank with these motivational tips.  How to Bust through the Mental Plateau  First, Set a Non Scale-related Goal  Don't let the scale have total control of your actions. Set a NEW goal that will instill in you a fighting spirit. Perhaps you could start training for a 5K marathon race, try four new healthy recipes next month, or resolve to master a fun new fitness skill.  Take a Break from the Nitty Gritty  Strict and rigorous routines can be energy-depleting, so try taking a small break. Hang back from the gym a few times this week and take your workout outside, or indulge in a “cheat food” you've been craving to break up the monotony of structured meals.  Give Yourself Something You Need  All work and no play isn't very motivating. You've put the pedal to the medal, so reward yourself with something you might really want or need (not food-related) to distract your mind.  Connect with Others on Similar Paths  The structured routine of our daily lives can be quite isolating. Add some spark to your day or week by getting social and spending time with your accountability partner(s), spouse, friends or close family. If any of them share similar goals, they can give you encouragement.  Try Something Completely Different  Whatever you've been doing to take the weight off… do a 180. Take your workouts somewhere else or try a new exercise altogether. Pick a new cuisine to try out and use it to meal prep your lunches. Put your scale away for a week and focus on your lifestyle, not the number.  Remember that you're not a machine. Without rest and rejuvenation, you probably won't have much luck sticking to the same rigorous weight-loss plan. Plateaus are natural, and they usually mean it's time for a new spark. Go with the flow, shake things up and return to your goals with new energy. Before  you know it, you'll be back on the fast track to long-term success.  S.M.A.R.T Goals: What They Are and Why They’re Important  July 19, 2022  Posted in Blog, Motivation  By Your Weight Matters Campaign    When you are working on health and wellness goals, S.M.A.R.T. goals are frequently used to keep moving toward your endpoint. Whether you are training for a 5K, reducing your sugar intake, or improving your sleep habits, S.M.A.R.T. goals can help you along the way. Setting and following structured goals is key to any health or behavior change.    What Are S.M.A.R.T. Goals?    S: Specific  This is not the time to be vague. This is the time to decide exactly what you want to do. For example, a goal of “I will lose weight” or “I will improve my sleep” is not specific. Work on adding specific information to your goals. “I will lose 10 lbs.” or “I will sleep at least six hours each night” are more specific and provide more direction. Specificity in goal-setting helps you realize exactly what you are working for.    M: Measurable  Make sure your goals are measurable. It’s very hard to determine if your goals are met without this element. “I will be healthy” or “I will be happy” are not measurable and can easily be forgotten. Measurable goals look a little different. “I will improve my health by sleeping six hours each night and doing yoga three times per week” is one example. Another would be, “I will work on my happiness by doing something for myself for 15 minutes each day.”    A: Attainable  It’s important to make sure your goal can be met and that it’s within reach. A new exerciser would struggle to meet the goal of “I will exercise at the gym for 60 minutes each day this month.” A more realistic goal would be, “I will walk at least three times a week for 30 minutes.” Once you meet your goals, make new ones. Reach a goal and set one more. Always have a goal in mind to work towards!    R: Realistic  It’s equally  important to make sure the goal you are working towards is realistic. Is it realistic to get back to a high school weight, compete a triathlon, or get rid of all the stress in your life? Probably not, but you can set goals that are realistic for your body and your lifestyle.    T: Timeframe  Give yourself a deadline. When will your goal be completed? Set an end date. For example, you might decide, “I will lose 10 lbs. by Fall break” or “I will keep a daily food log until school starts.” Assess where you are at the end of the timeframe and set a new goal, or revamp the old one if it didn’t work out for you.    Conclusion  Set aside some time on a regular basis to come up with a plan to set some goals. Goals in all areas of your life are important and motivate you to be healthier. Once you meet your goals, find something you can work on next. The alvarado is the limit when it comes to you and your health!

## 2025-06-02 ENCOUNTER — OFFICE VISIT (OUTPATIENT)
Facility: CLINIC | Age: 45
End: 2025-06-02
Payer: COMMERCIAL

## 2025-06-02 VITALS — SYSTOLIC BLOOD PRESSURE: 110 MMHG | BODY MASS INDEX: 28 KG/M2 | WEIGHT: 161.19 LBS | DIASTOLIC BLOOD PRESSURE: 68 MMHG

## 2025-06-02 DIAGNOSIS — E28.319 PREMATURE MENOPAUSE ON HRT: ICD-10-CM

## 2025-06-02 DIAGNOSIS — Z01.419 WELL WOMAN EXAM WITH ROUTINE GYNECOLOGICAL EXAM: Primary | ICD-10-CM

## 2025-06-02 DIAGNOSIS — Z12.31 SCREENING MAMMOGRAM FOR BREAST CANCER: ICD-10-CM

## 2025-06-02 DIAGNOSIS — Z98.890 H/O LEEP: ICD-10-CM

## 2025-06-02 DIAGNOSIS — E28.39 PREMATURE OVARIAN FAILURE: ICD-10-CM

## 2025-06-02 DIAGNOSIS — Z98.891 HISTORY OF C-SECTION: ICD-10-CM

## 2025-06-02 DIAGNOSIS — Z12.11 SCREENING FOR COLON CANCER: ICD-10-CM

## 2025-06-02 DIAGNOSIS — R87.615 UNSATISFACTORY CERVICAL CYTOLOGY SMEAR: ICD-10-CM

## 2025-06-02 DIAGNOSIS — Z12.4 SCREENING FOR CERVICAL CANCER: ICD-10-CM

## 2025-06-02 DIAGNOSIS — Z79.890 PREMATURE MENOPAUSE ON HRT: ICD-10-CM

## 2025-06-02 PROCEDURE — 88175 CYTOPATH C/V AUTO FLUID REDO: CPT

## 2025-06-02 PROCEDURE — 3074F SYST BP LT 130 MM HG: CPT

## 2025-06-02 PROCEDURE — 87624 HPV HI-RISK TYP POOLED RSLT: CPT

## 2025-06-02 PROCEDURE — 99396 PREV VISIT EST AGE 40-64: CPT

## 2025-06-02 PROCEDURE — 3078F DIAST BP <80 MM HG: CPT

## 2025-06-02 RX ORDER — ESTRADIOL AND NORETHINDRONE ACETATE 1; .5 MG/1; MG/1
1 TABLET ORAL DAILY
Qty: 90 TABLET | Refills: 1 | Status: SHIPPED | OUTPATIENT
Start: 2025-06-02

## 2025-06-02 NOTE — PROGRESS NOTES
GYN H&P     Genetic questionnaire reviewed with the patient and she will be referred for genetic counseling if the questionnaire had any positive results.    The [facility] intake form was also reviewed regarding contraception, menstrual periods, urinary health, and vaginal / sexual health    2025  9:44 AM    Chief Complaint   Patient presents with    Gynecologic Exam     Annual         HPI: Arleth is a 44 year old  No LMP recorded. (Menstrual status: Menopause).  (contraception: menopause) here for her annual gyn exam.     She has no complaints.  Hx of POI (FSH in 2017 = 127), menses absent. Was on HRT in the past but discontinued as she did not want to take so many medications. Wondering, though, if she should restart. Endorses low libido, vaginal dryness, and joint pain. Denies any pelvic or breast complaints.     Previous encounters and chart reviewed.     OB History    Para Term  AB Living   2 2 1 1  4   SAB IAB Ectopic Multiple Live Births      1 3      # Outcome Date GA Lbr Armand/2nd Weight Sex Type Anes PTL Lv   2A  14 32w1d  3 lb 6.5 oz (1.545 kg) M CS-LTranv Spinal N KHOA   2B  14 32w1d  4 lb 1.6 oz (1.86 kg) F CS-LTranv Spinal N KHOA   2C  14 32w1d  4 lb 3.2 oz (1.905 kg) M CS-LTranv Spinal N KHOA   1 Term 2006 39w0d  7 lb 8 oz (3.402 kg) F NORMAL SPONT EPI         GYN hx:   Menarche: 10  Period Cycle (Days): premature ovarian failure  Use of Birth Control (if yes, specify type): None  Pap Date: 23  Pap Result Notes: unsat/neg; 21 Neg/Neg; 2020 Neg/Neg; 2019 neg/neg; 2018 neg/pos 16; 2014 Normal pap smear, positive hpv  Follow Up Recommendation: leep 2018 - NA 3      Past Medical History[1]  Past Surgical History[2]  Allergies[3]  Medications Ordered Prior to Encounter[4]  Family History[5]  Social History     Socioeconomic History    Marital status:      Spouse name: Not on file     Number of children: Not on file    Years of education: Not on file    Highest education level: Not on file   Occupational History    Not on file   Tobacco Use    Smoking status: Former     Current packs/day: 0.25     Average packs/day: 0.3 packs/day for 6.0 years (1.5 ttl pk-yrs)     Types: Cigarettes    Smokeless tobacco: Never   Vaping Use    Vaping status: Never Used   Substance and Sexual Activity    Alcohol use: No    Drug use: No    Sexual activity: Yes     Partners: Male   Other Topics Concern     Service Not Asked    Blood Transfusions Not Asked    Caffeine Concern Yes    Occupational Exposure Not Asked    Hobby Hazards Not Asked    Sleep Concern Not Asked    Stress Concern Not Asked    Weight Concern Not Asked    Special Diet Not Asked    Back Care Not Asked    Exercise Yes    Bike Helmet Not Asked    Seat Belt Yes    Self-Exams Not Asked   Social History Narrative    Not on file     Social Drivers of Health     Food Insecurity: No Food Insecurity (6/2/2025)    NCSS - Food Insecurity     Worried About Running Out of Food in the Last Year: No     Ran Out of Food in the Last Year: No   Transportation Needs: No Transportation Needs (6/2/2025)    NCSS - Transportation     Lack of Transportation: No   Stress: Not on file   Housing Stability: Not At Risk (6/2/2025)    NCSS - Housing/Utilities     Has Housing: Yes     Worried About Losing Housing: No     Unable to Get Utilities: No       ROS:     Review of Systems:  General: denies fevers, chills, fatigue and malaise.   Eyes: no visual changes, denies headaches  ENT: no complaints, denies earaches, runny nose, epistaxis, throat pain or sore throat  Respiratory: denies SOB, dyspnea, cough or wheezing  Cardiovascular: denies chest pain, palpitations, exercise intolerance   GI: denies abdominal pain, diarrhea, constipation  : no complaints, denies dysuria, increased urinary frequency. Menses absent, no dyspareunia   Hematological/lymphatic: denies history  of excessive bleeding or bruising, denies dizziness, lightheadedness.   Breast: denies rashes, skin changes, pain, lumps or discharge   Psychiatric: denies depression, changes in sleep patterns, anxiety  Endocrine: denies hot or cold intolerance, mood changes   Neurological: denies changes in sight, smell, hearing or taste. Denies seizures or tremors  Immunological: denies allergies, denies anaphylaxis, or swollen lymph nodes  Musculoskeletal: denies joint pain, morning stiffness, decreased range of motion         O /68   Wt 161 lb 3.2 oz (73.1 kg)   BMI 27.67 kg/m²         Wt Readings from Last 6 Encounters:   06/02/25 161 lb 3.2 oz (73.1 kg)   02/06/25 175 lb (79.4 kg)   05/02/24 175 lb (79.4 kg)   04/23/24 173 lb 3.2 oz (78.6 kg)   01/08/24 163 lb (73.9 kg)   09/26/23 164 lb (74.4 kg)     Exam:   GENERAL: well developed, well nourished, in no apparent distress, oriented.  SKIN: no rashes, no suspicious lesions  HEENT: normal  NECK: supple; no thyromegaly, no adenopathy  LUNGS: clear to auscultation  CARDIOVASCULAR: normal S1, S2, RRR  BREASTS: soft, nontender, no palpable masses or nodes, no nipple discharge, no skin changes, no axillary adenopathy  ABDOMEN: low pfannenstiel c/s scar,  soft, non distended; non tender, no masses  PELVIC: External Genitalia: Normal appearing, no lesions.    Vagina: normal pink mucosa, no lesions, normal clear discharge.    Bladder well supported.  No  anterior or posterior hernias    Cervix: nulliparous, no lesions , No CMT     Uterus: AVAF, mobile, non tender, normal size    Adnexa: non tender, no masses, normal size    Rectal: deferred  EXTREMITIES:  non tender without edema             Patient counseled on:    Diet/exercise.      Self Breast Exams          Pap: unsat/neg 9/2023, collected today  GC/Chlamydia:  n/a  Mammogram:  2/2024, reordered   Dexa:  n/a  Colonoscopy / Cologuard:   cologuard ordered today  Lipid / Cholesterol:  4/2024 per PCP  Lipid Panel  Order:  [id number]   Collected 4/16/2024 10:49 AM       Status: Final result       Dx: Routine general medical examination a...    1 Result Note      Component  Ref Range & Units (hover) 4/16/24 10:49 AM   Cholesterol, Total 186   Comment: Desirable  <200 mg/dL  Borderline  200-239 mg/dL  High      >=240 mg/dL     HDL Cholesterol 58   Comment: Interpretive Information:  An HDL cholesterol <40 mg/dL is low and constitutes a coronary heart disease risk factor. An HDL cholesterol >60 mg/dL is a negative risk factor for coronary heart disease.     Triglycerides 44   Comment: Reference interval for fasting triglycerides  Desirable: <150 mg/dL  Borderline: 150-199 mg/dL  High: 200-499 mg/dL  Very High: >=500 mg/dL       LDL Cholesterol 119 High    Comment: Optimal            <100 mg/dL  Near/Above OptimaL 100-129 mg/dL  Borderline High    130-159 mg/dL  High               160-189 mg/dL    Very High          >190 mg/dL     VLDL 8   Non HDL Chol 128   Comment: Desirable  <130 mg/dL  Borderline  130-159 mg/dL  High        160-189 mg/dL      Very high >=190 mg/dL     Patient Fasting for Lipid? Yes   Resulting Agency [facility] ([facility])             Specimen Collected: 04/16/24 10:49 AM Last Resulted: 04/16/24  3:56 PM        A/P: Patient is 44 year old female with no complaints. Here for well woman exam.     Meds This Visit:    Requested Prescriptions     Signed Prescriptions Disp Refills    Estradiol-Norethindrone Acet 1-0.5 MG Oral Tab 90 tablet 1     Sig: Take 1 tablet by mouth daily.       1. Well woman exam with routine gynecological exam    2. Screening mammogram for breast cancer  - [facility] MALISSA 2D+3D SCREENING BILAT (CPT=77067/79168); Future    3. H/O LEEP  05/2018    4. Unsatisfactory cervical cytology smear  - ThinPrep PAP Smear; Future  - Hpv High Risk , Thin Prep Collection; Future    5. Screening for cervical cancer  - ThinPrep PAP Smear; Future  - Hpv High Risk , Thin Prep Collection; Future    6. Screening for colon cancer  -  COLOGUARD COLON CANCER SCREENING (EXTERNAL)    7. Premature menopause on HRT  - Estradiol-Norethindrone Acet 1-0.5 MG Oral Tab; Take 1 tablet by mouth daily.  Dispense: 90 tablet; Refill: 1    8. Premature ovarian failure  - Estradiol-Norethindrone Acet 1-0.5 MG Oral Tab; Take 1 tablet by mouth daily.  Dispense: 90 tablet; Refill: 1    9. History of --   Triplets    Discussed NAMS recommendations to continue HRT until the average age of menopause in the setting of premature ovarian failure. Reviewed impact of premature ovarian failure on bone health, pt open to restarting HRT. Discussed routes of administration, risks vs benefits of HRT, and common side effects. Pt opts to try PO estradiol and progesterone    Encouraged colon cancer screening through colonoscopy vs cologuard testing - pt accepting of Cologuard, requisition form filled out and faxed    Return in about 3 months (around 2025) for medication f/u.    Marilu Terry, APRN   2025  9:44 AM       This note was created by [employer] voice recognition. Errors in content may be related to improper recognition by the system; efforts to review and correct have been done but errors may still exist. Please contact me with any questions.    Note to patient and family   The 21st Century Cures Act makes medical notes available to patients in the interest of transparency.  However, please be advised that this is a medical document.  It is intended as [id number] communication.  It is written in medical language which may contain abbreviations or verbiage that are technical and unfamiliar.  It may appear blunt or direct.  Medical documents are intended to carry relevant information, facts as evident, and the clinical opinion of the practitioner.           [1]   Past Medical History:   Acute serous otitis media    Bell's palsy    Depression    PONV (postoperative nausea and vomiting)    Visual impairment    glasses   [2]   Past Surgical  History:  Procedure Laterality Date          Cholecystectomy      Laparoscopic cholecystectomy  3/1/2006    Leep  2018    jarocho 3   [3]   Allergies  Allergen Reactions    Animal Dander [Dander] OTHER (SEE COMMENTS)     Cats: Causes sneezing & watery eyes    Codeine DIARRHEA     Codeine Derivatives: Diarrhea   [4]   Current Outpatient Medications on File Prior to Visit   Medication Sig Dispense Refill    Tirzepatide-Weight Management (ZEPBOUND) 10 MG/0.5ML Subcutaneous Solution Auto-injector Inject 10 mg into the skin once a week. Start after completing full 4 weeks on 7.5 mg weekly dose. 2 mL 3    buPROPion  MG Oral Tablet 24 Hr Take 1 tablet (300 mg total) by mouth every morning. 90 tablet 1    FLUoxetine HCl 40 MG Oral Cap Take 1 capsule (40 mg total) by mouth daily. 90 capsule 1    fluticasone-salmeterol (ADVAIR DISKUS) 250-50 MCG/ACT Inhalation Aerosol Powder, Breath Activated Inhale 1 puff into the lungs every 12 (twelve) hours. 3 each 1    albuterol 108 (90 Base) MCG/ACT Inhalation Aero Soln Inhale 2 puffs into the lungs every 6 (six) hours as needed for Wheezing. 1 each 0    montelukast 10 MG Oral Tab Take 1 tablet (10 mg total) by mouth daily. 90 tablet 3    SUMAtriptan (IMITREX) 50 MG Oral Tab Use at onset; repeat once after 2 HRS-ONLY 2 IN 24 HR MAX 27 tablet 1    Loratadine 10 MG Oral Cap Take by mouth.      Vitamin D3, Cholecalciferol, (VITAMIN D3) 125 MCG (5000 UT) Oral Cap Take 1 capsule (5,000 Units total) by mouth daily.      Tirzepatide-Weight Management (ZEPBOUND) 7.5 MG/0.5ML Subcutaneous Solution Auto-injector Inject 7.5 mg into the skin once a week. 2 mL 0     No current facility-administered medications on file prior to visit.   [5]   Family History  Problem Relation Age of Onset    No Known Problems Mother     Hypertension Father     Lipids Father     Cancer Father     Pancreatic Cancer Father     Asthma Brother     Heart Disorder Maternal Grandfather     Cancer Paternal  Grandmother     Diabetes Paternal Grandfather

## 2025-06-03 LAB — HPV E6+E7 MRNA CVX QL NAA+PROBE: NEGATIVE

## 2025-07-13 NOTE — TELEPHONE ENCOUNTER
CPE   Future Appointments   Date Time Provider Beny Mccray   5/4/2021  1:00 PM LORETTA Urena [facility]      Orders to  THE [facility]  aware must fast no call back required No

## 2025-08-14 RX ORDER — MONTELUKAST SODIUM 10 MG/1
10 TABLET ORAL DAILY
Qty: 90 TABLET | Refills: 3 | Status: SHIPPED | OUTPATIENT
Start: 2025-08-14

## (undated) DEVICE — ELECTRODE LOOP WHITE

## (undated) DEVICE — NEEDLE SPINAL 22X3-1/2 BLK

## (undated) DEVICE — KENDALL SCD EXPRESS SLEEVES, KNEE LENGTH, MEDIUM: Brand: KENDALL SCD

## (undated) DEVICE — GYN CDS: Brand: MEDLINE INDUSTRIES, INC.

## (undated) DEVICE — SOL  .9 1000ML BTL

## (undated) DEVICE — CAUTERY PENCIL

## (undated) DEVICE — STERILE POLYISOPRENE POWDER-FREE SURGICAL GLOVES: Brand: PROTEXIS

## (undated) DEVICE — REM POLYHESIVE ADULT PATIENT RETURN ELECTRODE: Brand: VALLEYLAB

## (undated) DEVICE — SWAB PROCTO 16\" NON-STERILE

## (undated) DEVICE — REDUCER FITTING (NON-STERILE) 7/8 IN (22 MM) TO 1/4 IN (6.4 MM): Brand: CONMED BUFFALO FILTER

## (undated) DEVICE — ELECTRODE BALL 5MM DBL-511

## (undated) DEVICE — GLOVE SURG TRIUMPH SZ 71/2

## (undated) NOTE — MR AVS SNAPSHOT
[address] [phone]               Thank you for choosing us for your health care visit with Adriana Wade MD.  We are glad to serve you and happy to provide you with this summary of Commonly known as:  PROAIR HFA           BuPROPion HCl 100 MG Tabs   Take 1 tablet (100 mg total) by mouth 2 (two) times daily. Commonly known as:  WELLBUTRIN           CLARITIN 10 MG Tabs   Generic drug:  loratadine   Take 10 mg by mouth daily. office, you can view your past visit information in [employer] by going to Visits < Visit Summaries. [employer] questions? Call [phone] for help. [employer] is NOT to be used for urgent needs. For medical emergencies, dial 911.            Visit [facility]

## (undated) NOTE — LETTER
7/27/2023    To Whom It May Concern,     I am writing this letter on behalf of my patient, Alirio Huff, to express a concern. My patient is in need of prescription weight loss medication for weight loss maintenance that she is currently being told is not going to be covered due to her BMI under 30. Overweight is diagnosed with a BMI over 25 and weight loss is not controlled by lifestyle alone and requires medication management as a tool in adjunct with lifestyle to remain in control. It is well recognized that obesity is a chronic disease associated with over 200 co-morbidities including, but not limited to dyslipidemia, hypertension, and depression. Obesity deserves the same treatment and attention similar to any other chronic disease. You would not stop a patient's diabetes medication, heart disease medication, mental health medication or high blood pressure medication once they met their goal and are controlled on therapy. So why are you denying obesity medication? Zakia Sanchez has been working with me since 11/2016 to improve her health via weight loss. Since her journey she has been on multiple medication therapies in conjunction with lifestyle and the most successful was on a similar GLP-1 formulation that is no longer available to her. On this formulation she was able to reduce her weight from 184# to 138#, reduce her BMI from 31.5 to 23.6, and reduced her anxiety and depression. She is able to be more active and have a greater quality of life! Obesity affects so much more than weight and I urge you to reconsider coverage for Kimi Cohen so that she can maintain her weight loss and continue to remain in a healthy weight zone. Sincerely,         LORETTA Gonsalse, [facility] Obesity Medicine [place] [address] Weight Management  [facility] [address], [address] [facility], [place], [address]  [phone] [email or web address]

## (undated) NOTE — LETTER
ASTHMA ACTION PLAN for Arleth Denis     : 1980     Date: 24  Doctor:  LORETTA Benito  Phone for doctor or clinic: [facility], [address] [place] [id number] [phone]      ACT Score: 19    ACT Goal: 20 or greater    Call your provider if you require your rescue/quick reliever medication more than 2-3 times in a 24 hour period.    If you require your rescue inhaler/medication more than 2-3 times weekly, your asthma may not be under proper control and you should seek medical attention.    *Quick Relievers are Xopenex and Albuterol*    You can use the colors of a traffic light to help learn about your asthma medicines.         1. Green - Go! % of Personal Best Peak Flow   Use controller medicine.   Breathing is good  No cough or wheeze  Can work and play Medicine How much to take When to take it    Medications       Leukotriene Modulators Instructions     montelukast 10 MG Oral Tab Take 1 tablet (10 mg total) by mouth daily.     montelukast 10 MG Oral Tab (Discontinued) Take 1 tablet (10 mg total) by mouth daily.       Sympathomimetics Instructions     fluticasone-salmeterol (ADVAIR DISKUS) 250-50 MCG/ACT Inhalation Aerosol Powder, Breath Activated Inhale 1 puff into the lungs every 12 (twelve) hours.     albuterol 108 (90 Base) MCG/ACT Inhalation Aero Soln Inhale 2 puffs into the lungs every 6 (six) hours as needed for Wheezing.                    2. Yellow - Caution. 50-79% Personal Best Peak Flow  Use reliever medicine to keep an asthma attack from getting bad.   Cough  Quick Relievers  Wheezing  Tight Chest  Wake up at night Medicine How much to take When to take it    If symptoms are not improving in 24-48 hrs, call office for further instructions  Medications       Leukotriene Modulators Instructions     montelukast 10 MG Oral Tab Take 1 tablet (10 mg total) by mouth daily.     montelukast 10 MG Oral Tab (Discontinued)  Take 1 tablet (10 mg total) by mouth daily.       Sympathomimetics Instructions     fluticasone-salmeterol (ADVAIR DISKUS) 250-50 MCG/ACT Inhalation Aerosol Powder, Breath Activated Inhale 1 puff into the lungs every 12 (twelve) hours.     albuterol 108 (90 Base) MCG/ACT Inhalation Aero Soln Inhale 2 puffs into the lungs every 6 (six) hours as needed for Wheezing.                    3. Red - Stop! Danger! <50% Personal Best Peak Flow  Continue Controller Medications But ADD:   Medicine not helping  Breathing is hard and fast  Nose opens wide  Can't walk  Ribs show  Can't talk well Medicine How much to take When to take it    If your symptoms do not improve in ONE hour -  go to the emergency room or call 911 immediately! If symptoms improve, call office for appointment immediately.    Albuterol inhaler 2 puffs every 20 minutes for three treatments       Don't forget:  Rinse mouth after using inhaler  Use spacer for inhaler  Remember to get your Flu vaccine every fall!    [x] Asthma Action Plan reviewed with the caregiver and patient, and a copy of the plan was given to the patient/caregiver.   [] Asthma Action Plan reviewed with the caregiver and patient on the phone, and copy mailed to patient/caregiver or sent via [place].     Signatures:   Provider  LORETTA Benito Patient  Arleth Deins Caretaker       ASTHMA ACTION PLAN for Arleth Denis     : 1980     Date: 2024  Provider:  LORETTA Benito  Phone for doctor or clinic: [facility], [address] [place] [id number] [phone]    ACT Score: 19      You can use the colors of a traffic light to help learn about your asthma medicines.      1. Green - Go! % of Personal Best Peak Flow Use controller medicine.   Breathing is good  No cough or wheeze  Can work and play Medicine How much to take When to take it          2. Yellow - Caution. 50-79% Personal Best Peak  Flow.  Use  reliever medicine to keep an asthma attack from getting bad.   Cough  Wheezing  Tight Chest  Wake up at night Medicine How much to take When to take it           Additional instructions         3. Red - Stop! Danger!  <50% Personal Best Peak  Flow. Take these medications until  Get help from a doctor   Medicine not helping  Breathing is hard and fast  Nose opens wide  Can't walk  Ribs show  Can't talk well Medicine How much to take When to take it         Additional Instructions If your symptoms do not improve and you cannot contact your doctor, [facility] or call 911 immediately!     [x] Asthma Action Plan reviewed with patient (and caregiver if necessary) and a copy of the plan was given to the patient/caregiver.   [] Asthma Action Plan reviewed with patient (and caregiver if necessary) on the phone and mailed copy to patient or submitted via [employer].     Signatures:  Provider  LORETTA Benito   Patient Caretaker

## (undated) NOTE — MR AVS SNAPSHOT
[address] [phone]               Thank you for choosing us for your health care visit with Cheli Rudolph MD.  We are glad to serve you and happy to provide you with this summary of Take 2 tablets (200 mg total) by mouth 2 (two) times daily. What changed:  how much to take   Commonly known as:  WELLBUTRIN           CLARITIN 10 MG Tabs   Generic drug:  loratadine   Take 10 mg by mouth daily.            Diethylpropion HCl ER 75 MG Tb24 Call [phone] for help. [employer] is NOT to be used for urgent needs. For medical emergencies, dial 911.         Educational Information     Healthy Diet and Regular Exercise  [employer] for making healthy food choices  -

## (undated) NOTE — LETTER
ASTHMA ACTION PLAN for Mariella Davis     : 1980     Date: 2021  Provider:  LORETTA Durand  Phone for doctor or clinic: [address] [facility], [address] [facility], [facility]. [address], [id number] [place] [address]  [phone]

## (undated) NOTE — Clinical Note
Patient was seen for their 11 month f/u at [facility] with a total weight loss of 28# since initial consult. Marcello Shell transfer patient.

## (undated) NOTE — LETTER
ASTHMA ACTION PLAN for Linda Meza     : 1980     Date: 2020  Provider:  LORETTA Barraza  Phone for doctor or clinic: [facility], [address], [facility]. [facility] [address], [address] [facility]  [phone]

## (undated) NOTE — LETTER
ASTHMA ACTION PLAN for Kiran Bassett     : 1980     Date: 2019  Provider:  LORETTA Pozo  Phone for doctor or clinic: [facility], [address] [address] [facility], [address] [place] [address] [phone]

## (undated) NOTE — MR AVS SNAPSHOT
[address] [phone]               Thank you for choosing us for your health care visit with Lily Rizvi MD.  We are glad to serve you and happy to provide you with this summary of Commonly known as:  WELLBUTRIN           CLARITIN 10 MG Tabs   Generic drug:  loratadine   Take 10 mg by mouth daily.            Diethylpropion HCl ER 75 MG Tb24   Take 1 tab daily           MIRENA (52 MG) 20 MCG/24HR Iud   Generic drug:  Levonorgestrel   1

## (undated) NOTE — LETTER
ASTHMA ACTION PLAN for Marija Vizcaino     : 1980     Date: 2018  Provider:  Messi Gandhi NP  Phone for doctor or clinic: [address] [facility], [address] [facility], [address] [place] [id number] [phone] a on the phone and mailed copy to patient or submitted via [address]      Signatures:  Provider  Messi Gandhi NP   Patient Caretaker

## (undated) NOTE — MR AVS SNAPSHOT
[address] [phone]               Thank you for choosing us for your health care visit with Thea Pak MD.  We are glad to serve you and happy to provide you with this summary of MIRENA (52 MG) 20 MCG/24HR Iud   Generic drug:  Levonorgestrel   1 each by Intrauterine route once. Montelukast Sodium 10 MG Tabs   Take 1 tablet (10 mg total) by mouth once daily.    Commonly known as:  SINGULAIR           Multi Vitamin/Minerals office, you can view your past visit information in [employer] by going to Visits < Visit Summaries. [employer] questions? Call [phone] for help. [employer] is NOT to be used for urgent needs. For medical emergencies, dial 911.            Visit [facility]

## (undated) NOTE — MR AVS SNAPSHOT
[address] [phone]               Thank you for choosing us for your health care visit with Celina Cox MD.  We are glad to serve you and happy to provide you with this summary of MIRENA (52 MG) 20 MCG/24HR Iud   Generic drug:  Levonorgestrel   1 each by Intrauterine route once. Montelukast Sodium 10 MG Tabs   Take 1 tablet (10 mg total) by mouth once daily.    Commonly known as:  SINGULAIR           Multi Vitamin/M

## (undated) NOTE — LETTER
ASTHMA ACTION PLAN for Kiran Bassett     : 1980     Date: 2017  Provider:  INDIO Lawler  Phone for doctor or clinic: [address] [facility] 2 8 [address] [phone]